# Patient Record
Sex: MALE | Race: BLACK OR AFRICAN AMERICAN | Employment: UNEMPLOYED | URBAN - METROPOLITAN AREA
[De-identification: names, ages, dates, MRNs, and addresses within clinical notes are randomized per-mention and may not be internally consistent; named-entity substitution may affect disease eponyms.]

---

## 2018-04-11 ENCOUNTER — HOSPITAL ENCOUNTER (OUTPATIENT)
Dept: NEUROLOGY | Age: 43
Discharge: HOME | End: 2018-04-11
Payer: COMMERCIAL

## 2018-04-11 PROCEDURE — 95911 NRV CNDJ TEST 9-10 STUDIES: CPT

## 2018-04-11 PROCEDURE — 95911 NRV CNDJ TEST 9-10 STUDIES: CPT | Performed by: PHYSICAL MEDICINE & REHABILITATION

## 2018-04-11 PROCEDURE — 95886 MUSC TEST DONE W/N TEST COMP: CPT

## 2018-04-11 PROCEDURE — 95886 MUSC TEST DONE W/N TEST COMP: CPT | Performed by: PHYSICAL MEDICINE & REHABILITATION

## 2018-06-30 ENCOUNTER — HOSPITAL ENCOUNTER (EMERGENCY)
Age: 43
Discharge: HOME | End: 2018-06-30
Payer: COMMERCIAL

## 2018-06-30 VITALS
OXYGEN SATURATION: 97 % | TEMPERATURE: 96.7 F | HEART RATE: 96 BPM | SYSTOLIC BLOOD PRESSURE: 119 MMHG | DIASTOLIC BLOOD PRESSURE: 94 MMHG | RESPIRATION RATE: 17 BRPM

## 2018-06-30 DIAGNOSIS — J02.9 ACUTE PHARYNGITIS, UNSPECIFIED ETIOLOGY: Primary | ICD-10-CM

## 2018-06-30 LAB — STREP GRP A PCR: NEGATIVE

## 2018-06-30 PROCEDURE — 99282 EMERGENCY DEPT VISIT SF MDM: CPT

## 2018-06-30 PROCEDURE — 87880 STREP A ASSAY W/OPTIC: CPT

## 2018-06-30 RX ORDER — ACETAMINOPHEN 500 MG
1000 TABLET ORAL ONCE
Status: DISCONTINUED | OUTPATIENT
Start: 2018-06-30 | End: 2018-06-30 | Stop reason: HOSPADM

## 2018-06-30 RX ORDER — AMOXICILLIN 500 MG/1
500 CAPSULE ORAL 3 TIMES DAILY
Qty: 30 CAPSULE | Refills: 0 | Status: SHIPPED | OUTPATIENT
Start: 2018-06-30 | End: 2018-07-10

## 2018-06-30 RX ORDER — METHYLPREDNISOLONE 4 MG/1
TABLET ORAL
Qty: 1 KIT | Refills: 0 | Status: SHIPPED | OUTPATIENT
Start: 2018-06-30 | End: 2018-07-06

## 2018-06-30 NOTE — ED PROVIDER NOTES
high suspicion for Strep as per history/physical findings, Rapid Strep/Throat Culture testing was done and confirms the above findings  Pharyngitis is likely to  be Strep. Antibiotics are indicated at this time based on clinical presentation and physical findings. Not hypoxic, nothing to suggest pneumonia. Patient is well appearing, non toxic and appropriate for outpatient management. Plan of Care: Instruction provided in the use of fluids, vaporizer, acetaminophen, and other OTC medication for symptom control. Analgesics as needed, dose reviewed. Follow-up in 2 days, or sooner should symptoms worsen. Counseling: The emergency provider has spoken with the patient and discussed todays results, in addition to providing specific details for the plan of care and counseling regarding the diagnosis and prognosis. Questions are answered at this time and they are agreeable with the plan. Assessment     1. Acute pharyngitis, unspecified etiology      Plan   Discharge to home  Patient condition is good    New Medications     Discharge Medication List as of 6/30/2018 10:24 AM      START taking these medications    Details   amoxicillin (AMOXIL) 500 MG capsule Take 1 capsule by mouth 3 times daily for 10 days, Disp-30 capsule, R-0Print      methylPREDNISolone (MEDROL, PRASANTH,) 4 MG tablet Take by mouth., Disp-1 kit, R-0Print      Magic Mouthwash (MIRACLE MOUTHWASH) Swish and spit 5 mLs 4 times daily as needed for Irritation, Disp-240 mL, R-0Print           Electronically signed by NICHOLAS Hayward CNP   DD: 6/30/18  **This report was transcribed using voice recognition software. Every effort was made to ensure accuracy; however, inadvertent computerized transcription errors may be present.   END OF ED PROVIDER NOTE      NICHOLAS Hayward CNP  06/30/18 3 [facility] NICHOLAS Telles CNP  06/30/18 [id number]

## 2019-05-30 ENCOUNTER — APPOINTMENT (OUTPATIENT)
Dept: GENERAL RADIOLOGY | Age: 44
End: 2019-05-30
Payer: COMMERCIAL

## 2019-05-30 ENCOUNTER — HOSPITAL ENCOUNTER (EMERGENCY)
Age: 44
Discharge: HOME | End: 2019-05-30
Attending: EMERGENCY MEDICINE
Payer: COMMERCIAL

## 2019-05-30 VITALS
BODY MASS INDEX: 30.37 KG/M2 | RESPIRATION RATE: 16 BRPM | HEIGHT: 66 IN | SYSTOLIC BLOOD PRESSURE: 117 MMHG | DIASTOLIC BLOOD PRESSURE: 78 MMHG | WEIGHT: 189 LBS | TEMPERATURE: 98.2 F | HEART RATE: 98 BPM | OXYGEN SATURATION: 96 %

## 2019-05-30 DIAGNOSIS — L73.8 INFECTIOUS FOLLICULITIS: Primary | ICD-10-CM

## 2019-05-30 DIAGNOSIS — J45.901 EXACERBATION OF ASTHMA, UNSPECIFIED ASTHMA SEVERITY, UNSPECIFIED WHETHER PERSISTENT: ICD-10-CM

## 2019-05-30 DIAGNOSIS — B99.9 INFECTIOUS FOLLICULITIS: Primary | ICD-10-CM

## 2019-05-30 DIAGNOSIS — Z72.0 TOBACCO ABUSE: ICD-10-CM

## 2019-05-30 LAB
ANION GAP SERPL CALCULATED.3IONS-SCNC: 13 MMOL/L (ref 7–16)
BASOPHILS ABSOLUTE: 0.07 E9/L (ref 0–0.2)
BASOPHILS RELATIVE PERCENT: 0.6 % (ref 0–2)
BUN BLDV-MCNC: 17 MG/DL (ref 6–20)
CALCIUM SERPL-MCNC: 10 MG/DL (ref 8.6–10.2)
CHLORIDE BLD-SCNC: 96 MMOL/L (ref 98–107)
CO2: 24 MMOL/L (ref 22–29)
CREAT SERPL-MCNC: 0.8 MG/DL (ref 0.7–1.2)
EOSINOPHILS ABSOLUTE: 0.2 E9/L (ref 0.05–0.5)
EOSINOPHILS RELATIVE PERCENT: 1.8 % (ref 0–6)
GFR AFRICAN AMERICAN: >60
GFR NON-AFRICAN AMERICAN: >60 ML/MIN/1.73
GLUCOSE BLD-MCNC: 421 MG/DL (ref 74–99)
HCT VFR BLD CALC: 46.4 % (ref 37–54)
HEMOGLOBIN: 15.4 G/DL (ref 12.5–16.5)
IMMATURE GRANULOCYTES #: 0.08 E9/L
IMMATURE GRANULOCYTES %: 0.7 % (ref 0–5)
LYMPHOCYTES ABSOLUTE: 2.32 E9/L (ref 1.5–4)
LYMPHOCYTES RELATIVE PERCENT: 20.6 % (ref 20–42)
MCH RBC QN AUTO: 29.4 PG (ref 26–35)
MCHC RBC AUTO-ENTMCNC: 33.2 % (ref 32–34.5)
MCV RBC AUTO: 88.7 FL (ref 80–99.9)
MONOCYTES ABSOLUTE: 0.83 E9/L (ref 0.1–0.95)
MONOCYTES RELATIVE PERCENT: 7.4 % (ref 2–12)
NEUTROPHILS ABSOLUTE: 7.76 E9/L (ref 1.8–7.3)
NEUTROPHILS RELATIVE PERCENT: 68.9 % (ref 43–80)
PDW BLD-RTO: 15.1 FL (ref 11.5–15)
PLATELET # BLD: 310 E9/L (ref 130–450)
PMV BLD AUTO: 10.9 FL (ref 7–12)
POTASSIUM SERPL-SCNC: 4.7 MMOL/L (ref 3.5–5)
RBC # BLD: 5.23 E12/L (ref 3.8–5.8)
SODIUM BLD-SCNC: 133 MMOL/L (ref 132–146)
WBC # BLD: 11.3 E9/L (ref 4.5–11.5)

## 2019-05-30 PROCEDURE — 94664 DEMO&/EVAL PT USE INHALER: CPT

## 2019-05-30 PROCEDURE — 96374 THER/PROPH/DIAG INJ IV PUSH: CPT

## 2019-05-30 PROCEDURE — 80048 BASIC METABOLIC PNL TOTAL CA: CPT

## 2019-05-30 PROCEDURE — 99283 EMERGENCY DEPT VISIT LOW MDM: CPT

## 2019-05-30 PROCEDURE — 6370000000 HC RX 637 (ALT 250 FOR IP): Performed by: EMERGENCY MEDICINE

## 2019-05-30 PROCEDURE — 6360000002 HC RX W HCPCS: Performed by: EMERGENCY MEDICINE

## 2019-05-30 PROCEDURE — 94640 AIRWAY INHALATION TREATMENT: CPT

## 2019-05-30 PROCEDURE — 85025 COMPLETE CBC W/AUTO DIFF WBC: CPT

## 2019-05-30 PROCEDURE — 71046 X-RAY EXAM CHEST 2 VIEWS: CPT

## 2019-05-30 RX ORDER — DOXYCYCLINE HYCLATE 100 MG
100 TABLET ORAL 2 TIMES DAILY
Qty: 20 TABLET | Refills: 0 | Status: SHIPPED | OUTPATIENT
Start: 2019-05-30 | End: 2019-06-09

## 2019-05-30 RX ORDER — ALBUTEROL SULFATE 90 UG/1
2 AEROSOL, METERED RESPIRATORY (INHALATION) EVERY 6 HOURS PRN
Qty: 1 INHALER | Refills: 0 | Status: SHIPPED | OUTPATIENT
Start: 2019-05-30 | End: 2020-07-14

## 2019-05-30 RX ORDER — NEBULIZER ACCESSORIES
1 KIT MISCELLANEOUS DAILY PRN
Qty: 1 KIT | Refills: 0 | Status: SHIPPED | OUTPATIENT
Start: 2019-05-30

## 2019-05-30 RX ORDER — IPRATROPIUM BROMIDE AND ALBUTEROL SULFATE 2.5; .5 MG/3ML; MG/3ML
1 SOLUTION RESPIRATORY (INHALATION)
Status: COMPLETED | OUTPATIENT
Start: 2019-05-30 | End: 2019-05-30

## 2019-05-30 RX ORDER — METHYLPREDNISOLONE SODIUM SUCCINATE 125 MG/2ML
125 INJECTION, POWDER, LYOPHILIZED, FOR SOLUTION INTRAMUSCULAR; INTRAVENOUS ONCE
Status: COMPLETED | OUTPATIENT
Start: 2019-05-30 | End: 2019-05-30

## 2019-05-30 RX ORDER — METHYLPREDNISOLONE 4 MG/1
TABLET ORAL
Qty: 1 KIT | Refills: 0 | Status: SHIPPED | OUTPATIENT
Start: 2019-05-30 | End: 2019-06-05

## 2019-05-30 RX ADMIN — IPRATROPIUM BROMIDE AND ALBUTEROL SULFATE 1 AMPULE: .5; 3 SOLUTION RESPIRATORY (INHALATION) at 14:25

## 2019-05-30 RX ADMIN — IPRATROPIUM BROMIDE AND ALBUTEROL SULFATE 1 AMPULE: .5; 3 SOLUTION RESPIRATORY (INHALATION) at 14:43

## 2019-05-30 RX ADMIN — IPRATROPIUM BROMIDE AND ALBUTEROL SULFATE 1 AMPULE: .5; 3 SOLUTION RESPIRATORY (INHALATION) at 14:39

## 2019-05-30 RX ADMIN — METHYLPREDNISOLONE SODIUM SUCCINATE 125 MG: 125 INJECTION, POWDER, FOR SOLUTION INTRAMUSCULAR; INTRAVENOUS at 14:16

## 2019-05-30 ASSESSMENT — ENCOUNTER SYMPTOMS
WHEEZING: 1
RHINORRHEA: 0
BLOOD IN STOOL: 0
NAUSEA: 0
DIARRHEA: 0
CONSTIPATION: 0
BACK PAIN: 0
SINUS PRESSURE: 0
SHORTNESS OF BREATH: 0
COUGH: 1
EYE PAIN: 0
ABDOMINAL PAIN: 0
SORE THROAT: 0
VOMITING: 0
EYE DISCHARGE: 0
EYE REDNESS: 0

## 2019-05-30 ASSESSMENT — PAIN DESCRIPTION - PAIN TYPE: TYPE: ACUTE PAIN

## 2019-05-30 ASSESSMENT — PAIN DESCRIPTION - LOCATION: LOCATION: RIB CAGE
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
## 2019-08-27 ENCOUNTER — HOSPITAL ENCOUNTER (OUTPATIENT)
Age: 44
Discharge: HOME | End: 2019-08-29

## 2019-08-27 PROCEDURE — 86735 MUMPS ANTIBODY: CPT

## 2019-08-27 PROCEDURE — 86762 RUBELLA ANTIBODY: CPT

## 2019-08-27 PROCEDURE — 86787 VARICELLA-ZOSTER ANTIBODY: CPT

## 2019-08-27 PROCEDURE — 86765 RUBEOLA ANTIBODY: CPT

## 2019-08-29 LAB
MEASLES IMMUNE (IGG): NORMAL
MUMPS AB IGG: NORMAL
RUBELLA ANTIBODY IGG: NORMAL
VARICELLA-ZOSTER VIRUS AB, IGG: NORMAL

## 2019-11-28 ENCOUNTER — HOSPITAL ENCOUNTER (OUTPATIENT)
Age: 44
Setting detail: OBSERVATION
Discharge: HOME | End: 2019-11-30
Attending: EMERGENCY MEDICINE | Admitting: INTERNAL MEDICINE
Payer: COMMERCIAL

## 2019-11-28 DIAGNOSIS — B20 HUMAN IMMUNODEFICIENCY VIRUS (HIV) DISEASE (HCC): ICD-10-CM

## 2019-11-28 DIAGNOSIS — K13.79 UVULAR SWELLING: ICD-10-CM

## 2019-11-28 DIAGNOSIS — J36 PERITONSILLAR ABSCESS: Primary | ICD-10-CM

## 2019-11-28 LAB
ANION GAP SERPL CALCULATED.3IONS-SCNC: 18 MMOL/L (ref 7–16)
BASOPHILS ABSOLUTE: 0.02 E9/L (ref 0–0.2)
BASOPHILS RELATIVE PERCENT: 0.1 % (ref 0–2)
BUN BLDV-MCNC: 9 MG/DL (ref 6–20)
CALCIUM SERPL-MCNC: 9.4 MG/DL (ref 8.6–10.2)
CHLORIDE BLD-SCNC: 99 MMOL/L (ref 98–107)
CO2: 21 MMOL/L (ref 22–29)
CREAT SERPL-MCNC: 0.6 MG/DL (ref 0.7–1.2)
EOSINOPHILS ABSOLUTE: 0 E9/L (ref 0.05–0.5)
EOSINOPHILS RELATIVE PERCENT: 0 % (ref 0–6)
GFR AFRICAN AMERICAN: >60
GFR NON-AFRICAN AMERICAN: >60 ML/MIN/1.73
GLUCOSE BLD-MCNC: 172 MG/DL (ref 74–99)
HCT VFR BLD CALC: 43 % (ref 37–54)
HEMOGLOBIN: 13.8 G/DL (ref 12.5–16.5)
IMMATURE GRANULOCYTES #: 0.14 E9/L
IMMATURE GRANULOCYTES %: 0.8 % (ref 0–5)
LYMPHOCYTES ABSOLUTE: 1.09 E9/L (ref 1.5–4)
LYMPHOCYTES RELATIVE PERCENT: 5.9 % (ref 20–42)
MCH RBC QN AUTO: 28.1 PG (ref 26–35)
MCHC RBC AUTO-ENTMCNC: 32.1 % (ref 32–34.5)
MCV RBC AUTO: 87.6 FL (ref 80–99.9)
MONOCYTES ABSOLUTE: 0.39 E9/L (ref 0.1–0.95)
MONOCYTES RELATIVE PERCENT: 2.1 % (ref 2–12)
NEUTROPHILS ABSOLUTE: 16.99 E9/L (ref 1.8–7.3)
NEUTROPHILS RELATIVE PERCENT: 91.1 % (ref 43–80)
PDW BLD-RTO: 12.9 FL (ref 11.5–15)
PLATELET # BLD: 381 E9/L (ref 130–450)
PMV BLD AUTO: 10.4 FL (ref 7–12)
POTASSIUM SERPL-SCNC: 4.1 MMOL/L (ref 3.5–5)
RBC # BLD: 4.91 E12/L (ref 3.8–5.8)
SODIUM BLD-SCNC: 138 MMOL/L (ref 132–146)
WBC # BLD: 18.6 E9/L (ref 4.5–11.5)

## 2019-11-28 PROCEDURE — 6360000002 HC RX W HCPCS

## 2019-11-28 PROCEDURE — 94761 N-INVAS EAR/PLS OXIMETRY MLT: CPT

## 2019-11-28 PROCEDURE — 96374 THER/PROPH/DIAG INJ IV PUSH: CPT

## 2019-11-28 PROCEDURE — G0378 HOSPITAL OBSERVATION PER HR: HCPCS

## 2019-11-28 PROCEDURE — 96375 TX/PRO/DX INJ NEW DRUG ADDON: CPT

## 2019-11-28 PROCEDURE — 85025 COMPLETE CBC W/AUTO DIFF WBC: CPT

## 2019-11-28 PROCEDURE — 80048 BASIC METABOLIC PNL TOTAL CA: CPT

## 2019-11-28 PROCEDURE — 6370000000 HC RX 637 (ALT 250 FOR IP): Performed by: STUDENT IN AN ORGANIZED HEALTH CARE EDUCATION/TRAINING PROGRAM

## 2019-11-28 PROCEDURE — 6360000002 HC RX W HCPCS: Performed by: EMERGENCY MEDICINE

## 2019-11-28 PROCEDURE — 36415 COLL VENOUS BLD VENIPUNCTURE: CPT

## 2019-11-28 PROCEDURE — 99284 EMERGENCY DEPT VISIT MOD MDM: CPT

## 2019-11-28 RX ORDER — HYDROCHLOROTHIAZIDE 12.5 MG/1
12.5 TABLET ORAL DAILY
Status: DISCONTINUED | OUTPATIENT
Start: 2019-11-29 | End: 2019-11-30 | Stop reason: HOSPADM

## 2019-11-28 RX ORDER — MORPHINE SULFATE 2 MG/ML
2 INJECTION, SOLUTION INTRAMUSCULAR; INTRAVENOUS ONCE
Status: COMPLETED | OUTPATIENT
Start: 2019-11-28 | End: 2019-11-28

## 2019-11-28 RX ORDER — LISINOPRIL 20 MG/1
40 TABLET ORAL DAILY
Status: DISCONTINUED | OUTPATIENT
Start: 2019-11-29 | End: 2019-11-30 | Stop reason: HOSPADM

## 2019-11-28 RX ORDER — ONDANSETRON 2 MG/ML
4 INJECTION INTRAMUSCULAR; INTRAVENOUS EVERY 6 HOURS PRN
Status: DISCONTINUED | OUTPATIENT
Start: 2019-11-28 | End: 2019-11-30 | Stop reason: HOSPADM

## 2019-11-28 RX ORDER — INSULIN GLARGINE 100 [IU]/ML
30 INJECTION, SOLUTION SUBCUTANEOUS DAILY
Status: DISCONTINUED | OUTPATIENT
Start: 2019-11-29 | End: 2019-11-30 | Stop reason: HOSPADM

## 2019-11-28 RX ORDER — SODIUM CHLORIDE 0.9 % (FLUSH) 0.9 %
10 SYRINGE (ML) INJECTION PRN
Status: DISCONTINUED | OUTPATIENT
Start: 2019-11-28 | End: 2019-11-30 | Stop reason: HOSPADM

## 2019-11-28 RX ORDER — SODIUM CHLORIDE 0.9 % (FLUSH) 0.9 %
10 SYRINGE (ML) INJECTION EVERY 12 HOURS SCHEDULED
Status: DISCONTINUED | OUTPATIENT
Start: 2019-11-28 | End: 2019-11-30 | Stop reason: HOSPADM

## 2019-11-28 RX ORDER — DULOXETIN HYDROCHLORIDE 30 MG/1
30 CAPSULE, DELAYED RELEASE ORAL DAILY
Status: DISCONTINUED | OUTPATIENT
Start: 2019-11-29 | End: 2019-11-30 | Stop reason: HOSPADM

## 2019-11-28 RX ORDER — ALBUTEROL SULFATE 90 UG/1
2 AEROSOL, METERED RESPIRATORY (INHALATION) EVERY 6 HOURS PRN
Status: DISCONTINUED | OUTPATIENT
Start: 2019-11-28 | End: 2019-11-30 | Stop reason: HOSPADM

## 2019-11-28 RX ORDER — DEXAMETHASONE SODIUM PHOSPHATE 10 MG/ML
10 INJECTION INTRAMUSCULAR; INTRAVENOUS EVERY 8 HOURS
Status: DISCONTINUED | OUTPATIENT
Start: 2019-11-28 | End: 2019-11-30 | Stop reason: HOSPADM

## 2019-11-28 RX ORDER — EMTRICITABINE AND TENOFOVIR DISOPROXIL FUMARATE 200; 300 MG/1; MG/1
1 TABLET, FILM COATED ORAL DAILY
Status: DISCONTINUED | OUTPATIENT
Start: 2019-11-29 | End: 2019-11-30 | Stop reason: HOSPADM

## 2019-11-28 RX ORDER — MORPHINE SULFATE 2 MG/ML
INJECTION, SOLUTION INTRAMUSCULAR; INTRAVENOUS
Status: COMPLETED
Start: 2019-11-28 | End: 2019-11-28

## 2019-11-28 RX ORDER — MONTELUKAST SODIUM 10 MG/1
10 TABLET ORAL NIGHTLY
Status: DISCONTINUED | OUTPATIENT
Start: 2019-11-28 | End: 2019-11-30 | Stop reason: HOSPADM

## 2019-11-28 RX ORDER — PANTOPRAZOLE SODIUM 40 MG/1
40 TABLET, DELAYED RELEASE ORAL
Status: DISCONTINUED | OUTPATIENT
Start: 2019-11-29 | End: 2019-11-30 | Stop reason: HOSPADM

## 2019-11-28 RX ORDER — FLUTICASONE PROPIONATE 50 MCG
1 SPRAY, SUSPENSION (ML) NASAL DAILY
Status: DISCONTINUED | OUTPATIENT
Start: 2019-11-29 | End: 2019-11-30 | Stop reason: HOSPADM

## 2019-11-28 RX ORDER — GABAPENTIN 100 MG/1
100 CAPSULE ORAL 3 TIMES DAILY
Status: DISCONTINUED | OUTPATIENT
Start: 2019-11-28 | End: 2019-11-30 | Stop reason: HOSPADM

## 2019-11-28 RX ORDER — ATAZANAVIR 300 MG/1
300 CAPSULE ORAL
Status: DISCONTINUED | OUTPATIENT
Start: 2019-11-29 | End: 2019-11-30 | Stop reason: HOSPADM

## 2019-11-28 RX ADMIN — MORPHINE SULFATE 2 MG: 2 INJECTION, SOLUTION INTRAMUSCULAR; INTRAVENOUS at 21:18

## 2019-11-28 RX ADMIN — DEXAMETHASONE SODIUM PHOSPHATE 10 MG: 10 INJECTION INTRAMUSCULAR; INTRAVENOUS at 22:13

## 2019-11-28 RX ADMIN — BENZOCAINE, BUTAMBEN, AND TETRACAINE HYDROCHLORIDE 1 SPRAY: .028; .004; .004 AEROSOL, SPRAY TOPICAL at 21:20

## 2019-11-28 ASSESSMENT — PAIN SCALES - GENERAL
PAINLEVEL_OUTOF10: 8
PAINLEVEL_OUTOF10: 10
PAINLEVEL_OUTOF10: 0

## 2019-11-28 ASSESSMENT — PAIN DESCRIPTION - LOCATION: LOCATION: THROAT

## 2019-11-29 LAB
HCT VFR BLD CALC: 42 % (ref 37–54)
HEMOGLOBIN: 13.7 G/DL (ref 12.5–16.5)
MCH RBC QN AUTO: 29 PG (ref 26–35)
MCHC RBC AUTO-ENTMCNC: 32.6 % (ref 32–34.5)
MCV RBC AUTO: 88.8 FL (ref 80–99.9)
PDW BLD-RTO: 12.9 FL (ref 11.5–15)
PLATELET # BLD: 384 E9/L (ref 130–450)
PMV BLD AUTO: 10.8 FL (ref 7–12)
RBC # BLD: 4.73 E12/L (ref 3.8–5.8)
WBC # BLD: 16.2 E9/L (ref 4.5–11.5)

## 2019-11-29 PROCEDURE — 6370000000 HC RX 637 (ALT 250 FOR IP): Performed by: CLINICAL NURSE SPECIALIST

## 2019-11-29 PROCEDURE — 2580000003 HC RX 258: Performed by: INTERNAL MEDICINE

## 2019-11-29 PROCEDURE — G0378 HOSPITAL OBSERVATION PER HR: HCPCS

## 2019-11-29 PROCEDURE — 96376 TX/PRO/DX INJ SAME DRUG ADON: CPT

## 2019-11-29 PROCEDURE — 2580000003 HC RX 258: Performed by: CLINICAL NURSE SPECIALIST

## 2019-11-29 PROCEDURE — 96366 THER/PROPH/DIAG IV INF ADDON: CPT

## 2019-11-29 PROCEDURE — 6360000002 HC RX W HCPCS: Performed by: CLINICAL NURSE SPECIALIST

## 2019-11-29 PROCEDURE — 85027 COMPLETE CBC AUTOMATED: CPT

## 2019-11-29 PROCEDURE — 96365 THER/PROPH/DIAG IV INF INIT: CPT

## 2019-11-29 PROCEDURE — 36415 COLL VENOUS BLD VENIPUNCTURE: CPT

## 2019-11-29 PROCEDURE — 6360000002 HC RX W HCPCS: Performed by: INTERNAL MEDICINE

## 2019-11-29 RX ADMIN — LISINOPRIL 40 MG: 20 TABLET ORAL at 09:35

## 2019-11-29 RX ADMIN — GABAPENTIN 100 MG: 100 CAPSULE ORAL at 21:56

## 2019-11-29 RX ADMIN — FLUTICASONE PROPIONATE 1 SPRAY: 50 SPRAY, METERED NASAL at 09:34

## 2019-11-29 RX ADMIN — DEXAMETHASONE SODIUM PHOSPHATE 10 MG: 10 INJECTION INTRAMUSCULAR; INTRAVENOUS at 13:58

## 2019-11-29 RX ADMIN — INSULIN GLARGINE 30 UNITS: 100 INJECTION, SOLUTION SUBCUTANEOUS at 09:35

## 2019-11-29 RX ADMIN — METFORMIN HYDROCHLORIDE 500 MG: 500 TABLET ORAL at 09:35

## 2019-11-29 RX ADMIN — EMTRICITABINE AND TENOFOVIR DISOPROXIL FUMARATE 1 TABLET: 200; 300 TABLET, FILM COATED ORAL at 09:34

## 2019-11-29 RX ADMIN — GABAPENTIN 100 MG: 100 CAPSULE ORAL at 14:35

## 2019-11-29 RX ADMIN — DEXAMETHASONE SODIUM PHOSPHATE 10 MG: 10 INJECTION INTRAMUSCULAR; INTRAVENOUS at 21:56

## 2019-11-29 RX ADMIN — Medication 10 ML: at 14:07

## 2019-11-29 RX ADMIN — Medication 10 ML: at 22:02

## 2019-11-29 RX ADMIN — AMPICILLIN SODIUM AND SULBACTAM SODIUM 3 G: 2; 1 INJECTION, POWDER, FOR SOLUTION INTRAMUSCULAR; INTRAVENOUS at 18:00

## 2019-11-29 RX ADMIN — HYDROCHLOROTHIAZIDE 12.5 MG: 12.5 TABLET ORAL at 09:35

## 2019-11-29 RX ADMIN — AMPICILLIN SODIUM AND SULBACTAM SODIUM 3 G: 2; 1 INJECTION, POWDER, FOR SOLUTION INTRAMUSCULAR; INTRAVENOUS at 23:16

## 2019-11-29 RX ADMIN — GABAPENTIN 100 MG: 100 CAPSULE ORAL at 00:18

## 2019-11-29 RX ADMIN — ATAZANAVIR 300 MG: 300 CAPSULE ORAL at 13:58

## 2019-11-29 RX ADMIN — PANTOPRAZOLE SODIUM 40 MG: 40 TABLET, DELAYED RELEASE ORAL at 06:31

## 2019-11-29 RX ADMIN — MONTELUKAST SODIUM 10 MG: 10 TABLET ORAL at 21:56

## 2019-11-29 RX ADMIN — AMPICILLIN SODIUM AND SULBACTAM SODIUM 3 G: 2; 1 INJECTION, POWDER, FOR SOLUTION INTRAMUSCULAR; INTRAVENOUS at 13:59

## 2019-11-29 RX ADMIN — DULOXETINE HYDROCHLORIDE 30 MG: 30 CAPSULE, DELAYED RELEASE ORAL at 09:35

## 2019-11-29 RX ADMIN — MONTELUKAST SODIUM 10 MG: 10 TABLET ORAL at 00:18

## 2019-11-29 RX ADMIN — DEXAMETHASONE SODIUM PHOSPHATE 10 MG: 10 INJECTION INTRAMUSCULAR; INTRAVENOUS at 06:30

## 2019-11-29 RX ADMIN — Medication 10 ML: at 00:18

## 2019-11-29 RX ADMIN — GABAPENTIN 100 MG: 100 CAPSULE ORAL at 09:35

## 2019-11-29 ASSESSMENT — PAIN SCALES - GENERAL
PAINLEVEL_OUTOF10: 0
PAINLEVEL_OUTOF10: 0

## 2019-11-30 VITALS
WEIGHT: 150 LBS | HEART RATE: 64 BPM | OXYGEN SATURATION: 98 % | SYSTOLIC BLOOD PRESSURE: 123 MMHG | BODY MASS INDEX: 22.73 KG/M2 | RESPIRATION RATE: 18 BRPM | DIASTOLIC BLOOD PRESSURE: 70 MMHG | HEIGHT: 68 IN | TEMPERATURE: 98.5 F

## 2019-11-30 LAB — METER GLUCOSE: 358 MG/DL (ref 74–99)

## 2019-11-30 PROCEDURE — 6360000002 HC RX W HCPCS: Performed by: INTERNAL MEDICINE

## 2019-11-30 PROCEDURE — 6370000000 HC RX 637 (ALT 250 FOR IP): Performed by: CLINICAL NURSE SPECIALIST

## 2019-11-30 PROCEDURE — 6360000002 HC RX W HCPCS: Performed by: CLINICAL NURSE SPECIALIST

## 2019-11-30 PROCEDURE — G0378 HOSPITAL OBSERVATION PER HR: HCPCS

## 2019-11-30 PROCEDURE — 2580000003 HC RX 258: Performed by: CLINICAL NURSE SPECIALIST

## 2019-11-30 PROCEDURE — 96376 TX/PRO/DX INJ SAME DRUG ADON: CPT

## 2019-11-30 PROCEDURE — 82962 GLUCOSE BLOOD TEST: CPT

## 2019-11-30 PROCEDURE — 99253 IP/OBS CNSLTJ NEW/EST LOW 45: CPT | Performed by: OTOLARYNGOLOGY

## 2019-11-30 PROCEDURE — 2580000003 HC RX 258: Performed by: INTERNAL MEDICINE

## 2019-11-30 PROCEDURE — 96366 THER/PROPH/DIAG IV INF ADDON: CPT

## 2019-11-30 RX ORDER — CLINDAMYCIN HYDROCHLORIDE 300 MG/1
300 CAPSULE ORAL 3 TIMES DAILY
Qty: 15 CAPSULE | Refills: 0 | Status: SHIPPED | OUTPATIENT
Start: 2019-11-30 | End: 2019-12-05

## 2019-11-30 RX ORDER — PREDNISONE 20 MG/1
40 TABLET ORAL DAILY
Qty: 10 TABLET | Refills: 0 | Status: SHIPPED | OUTPATIENT
Start: 2019-11-30 | End: 2019-12-05

## 2019-11-30 RX ADMIN — AMPICILLIN SODIUM AND SULBACTAM SODIUM 3 G: 2; 1 INJECTION, POWDER, FOR SOLUTION INTRAMUSCULAR; INTRAVENOUS at 05:50

## 2019-11-30 RX ADMIN — DULOXETINE HYDROCHLORIDE 30 MG: 30 CAPSULE, DELAYED RELEASE ORAL at 08:29

## 2019-11-30 RX ADMIN — METFORMIN HYDROCHLORIDE 500 MG: 500 TABLET ORAL at 08:29

## 2019-11-30 RX ADMIN — PANTOPRAZOLE SODIUM 40 MG: 40 TABLET, DELAYED RELEASE ORAL at 06:48

## 2019-11-30 RX ADMIN — DEXAMETHASONE SODIUM PHOSPHATE 10 MG: 10 INJECTION INTRAMUSCULAR; INTRAVENOUS at 06:48

## 2019-11-30 RX ADMIN — AMPICILLIN SODIUM AND SULBACTAM SODIUM 3 G: 2; 1 INJECTION, POWDER, FOR SOLUTION INTRAMUSCULAR; INTRAVENOUS at 10:31

## 2019-11-30 RX ADMIN — Medication 10 ML: at 10:33

## 2019-11-30 RX ADMIN — EMTRICITABINE AND TENOFOVIR DISOPROXIL FUMARATE 1 TABLET: 200; 300 TABLET, FILM COATED ORAL at 08:28

## 2019-11-30 RX ADMIN — GABAPENTIN 100 MG: 100 CAPSULE ORAL at 08:29

## 2019-11-30 RX ADMIN — DEXAMETHASONE SODIUM PHOSPHATE 10 MG: 10 INJECTION INTRAMUSCULAR; INTRAVENOUS at 10:34

## 2019-11-30 RX ADMIN — ATAZANAVIR 300 MG: 300 CAPSULE ORAL at 08:28

## 2019-11-30 RX ADMIN — INSULIN GLARGINE 30 UNITS: 100 INJECTION, SOLUTION SUBCUTANEOUS at 08:36

## 2019-11-30 RX ADMIN — HYDROCHLOROTHIAZIDE 12.5 MG: 12.5 TABLET ORAL at 08:29

## 2019-11-30 RX ADMIN — LISINOPRIL 40 MG: 20 TABLET ORAL at 08:29

## 2019-11-30 RX ADMIN — Medication 10 ML: at 08:30

## 2019-11-30 ASSESSMENT — PAIN SCALES - GENERAL: PAINLEVEL_OUTOF10: 0

## 2020-01-26 ENCOUNTER — HOSPITAL ENCOUNTER (OUTPATIENT)
Age: 45
Setting detail: OBSERVATION
Discharge: HOME | End: 2020-01-27
Attending: EMERGENCY MEDICINE | Admitting: INTERNAL MEDICINE
Payer: COMMERCIAL

## 2020-01-26 ENCOUNTER — APPOINTMENT (OUTPATIENT)
Dept: CT IMAGING | Age: 45
End: 2020-01-26
Payer: COMMERCIAL

## 2020-01-26 VITALS
HEIGHT: 68 IN | DIASTOLIC BLOOD PRESSURE: 87 MMHG | OXYGEN SATURATION: 98 % | SYSTOLIC BLOOD PRESSURE: 142 MMHG | TEMPERATURE: 98.7 F | BODY MASS INDEX: 25.01 KG/M2 | HEART RATE: 101 BPM | WEIGHT: 165 LBS | RESPIRATION RATE: 18 BRPM

## 2020-01-26 LAB
ALBUMIN SERPL-MCNC: 4.2 G/DL (ref 3.5–5.2)
ALP BLD-CCNC: 85 U/L (ref 40–129)
ALT SERPL-CCNC: 18 U/L (ref 0–40)
ANION GAP SERPL CALCULATED.3IONS-SCNC: 12 MMOL/L (ref 7–16)
AST SERPL-CCNC: 24 U/L (ref 0–39)
BASOPHILS ABSOLUTE: 0.09 E9/L (ref 0–0.2)
BASOPHILS RELATIVE PERCENT: 0.7 % (ref 0–2)
BILIRUB SERPL-MCNC: 0.7 MG/DL (ref 0–1.2)
BUN BLDV-MCNC: 12 MG/DL (ref 6–20)
C-REACTIVE PROTEIN: 4 MG/DL (ref 0–0.4)
CALCIUM SERPL-MCNC: 9.3 MG/DL (ref 8.6–10.2)
CHLORIDE BLD-SCNC: 99 MMOL/L (ref 98–107)
CO2: 26 MMOL/L (ref 22–29)
CREAT SERPL-MCNC: 0.9 MG/DL (ref 0.7–1.2)
EOSINOPHILS ABSOLUTE: 0.16 E9/L (ref 0.05–0.5)
EOSINOPHILS RELATIVE PERCENT: 1.3 % (ref 0–6)
FOLATE: 13.4 NG/ML (ref 4.8–24.2)
GFR AFRICAN AMERICAN: >60
GFR NON-AFRICAN AMERICAN: >60 ML/MIN/1.73
GLUCOSE BLD-MCNC: 198 MG/DL (ref 74–99)
HCT VFR BLD CALC: 46.4 % (ref 37–54)
HEMOGLOBIN: 15 G/DL (ref 12.5–16.5)
IMMATURE GRANULOCYTES #: 0.06 E9/L
IMMATURE GRANULOCYTES %: 0.5 % (ref 0–5)
LACTIC ACID: 0.9 MMOL/L (ref 0.5–2.2)
LYMPHOCYTES ABSOLUTE: 2.62 E9/L (ref 1.5–4)
LYMPHOCYTES RELATIVE PERCENT: 20.9 % (ref 20–42)
MCH RBC QN AUTO: 29.2 PG (ref 26–35)
MCHC RBC AUTO-ENTMCNC: 32.3 % (ref 32–34.5)
MCV RBC AUTO: 90.4 FL (ref 80–99.9)
METER GLUCOSE: 321 MG/DL (ref 74–99)
METER GLUCOSE: 329 MG/DL (ref 74–99)
MONO TEST: NEGATIVE
MONOCYTES ABSOLUTE: 1.07 E9/L (ref 0.1–0.95)
MONOCYTES RELATIVE PERCENT: 8.5 % (ref 2–12)
NEUTROPHILS ABSOLUTE: 8.56 E9/L (ref 1.8–7.3)
NEUTROPHILS RELATIVE PERCENT: 68.1 % (ref 43–80)
PDW BLD-RTO: 15.6 FL (ref 11.5–15)
PLATELET # BLD: 307 E9/L (ref 130–450)
PMV BLD AUTO: 10.4 FL (ref 7–12)
POTASSIUM REFLEX MAGNESIUM: 4.4 MMOL/L (ref 3.5–5)
RBC # BLD: 5.13 E12/L (ref 3.8–5.8)
SEDIMENTATION RATE, ERYTHROCYTE: 4 MM/HR (ref 0–15)
SODIUM BLD-SCNC: 137 MMOL/L (ref 132–146)
STREP GRP A PCR: NEGATIVE
TOTAL PROTEIN: 7.8 G/DL (ref 6.4–8.3)
VITAMIN B-12: 415 PG/ML (ref 211–946)
WBC # BLD: 12.6 E9/L (ref 4.5–11.5)

## 2020-01-26 PROCEDURE — 96375 TX/PRO/DX INJ NEW DRUG ADDON: CPT

## 2020-01-26 PROCEDURE — 96365 THER/PROPH/DIAG IV INF INIT: CPT

## 2020-01-26 PROCEDURE — 96372 THER/PROPH/DIAG INJ SC/IM: CPT

## 2020-01-26 PROCEDURE — 82962 GLUCOSE BLOOD TEST: CPT

## 2020-01-26 PROCEDURE — 6360000004 HC RX CONTRAST MEDICATION: Performed by: RADIOLOGY

## 2020-01-26 PROCEDURE — 6360000002 HC RX W HCPCS: Performed by: INTERNAL MEDICINE

## 2020-01-26 PROCEDURE — 82746 ASSAY OF FOLIC ACID SERUM: CPT

## 2020-01-26 PROCEDURE — 86308 HETEROPHILE ANTIBODY SCREEN: CPT

## 2020-01-26 PROCEDURE — 6360000002 HC RX W HCPCS: Performed by: NURSE PRACTITIONER

## 2020-01-26 PROCEDURE — 80053 COMPREHEN METABOLIC PANEL: CPT

## 2020-01-26 PROCEDURE — G0378 HOSPITAL OBSERVATION PER HR: HCPCS

## 2020-01-26 PROCEDURE — 86140 C-REACTIVE PROTEIN: CPT

## 2020-01-26 PROCEDURE — 82607 VITAMIN B-12: CPT

## 2020-01-26 PROCEDURE — 2580000003 HC RX 258: Performed by: INTERNAL MEDICINE

## 2020-01-26 PROCEDURE — 87880 STREP A ASSAY W/OPTIC: CPT

## 2020-01-26 PROCEDURE — 70491 CT SOFT TISSUE NECK W/DYE: CPT

## 2020-01-26 PROCEDURE — 99285 EMERGENCY DEPT VISIT HI MDM: CPT

## 2020-01-26 PROCEDURE — 2580000003 HC RX 258: Performed by: NURSE PRACTITIONER

## 2020-01-26 PROCEDURE — 6370000000 HC RX 637 (ALT 250 FOR IP): Performed by: INTERNAL MEDICINE

## 2020-01-26 PROCEDURE — 83605 ASSAY OF LACTIC ACID: CPT

## 2020-01-26 PROCEDURE — 2500000003 HC RX 250 WO HCPCS: Performed by: NURSE PRACTITIONER

## 2020-01-26 PROCEDURE — 85025 COMPLETE CBC W/AUTO DIFF WBC: CPT

## 2020-01-26 PROCEDURE — 96367 TX/PROPH/DG ADDL SEQ IV INF: CPT

## 2020-01-26 PROCEDURE — 36415 COLL VENOUS BLD VENIPUNCTURE: CPT

## 2020-01-26 PROCEDURE — 96376 TX/PRO/DX INJ SAME DRUG ADON: CPT

## 2020-01-26 PROCEDURE — 85651 RBC SED RATE NONAUTOMATED: CPT

## 2020-01-26 RX ORDER — LISINOPRIL 20 MG/1
40 TABLET ORAL DAILY
Status: DISCONTINUED | OUTPATIENT
Start: 2020-01-26 | End: 2020-01-27 | Stop reason: HOSPADM

## 2020-01-26 RX ORDER — MONTELUKAST SODIUM 10 MG/1
10 TABLET ORAL NIGHTLY
Status: DISCONTINUED | OUTPATIENT
Start: 2020-01-26 | End: 2020-01-27 | Stop reason: HOSPADM

## 2020-01-26 RX ORDER — DEXAMETHASONE SODIUM PHOSPHATE 10 MG/ML
10 INJECTION INTRAMUSCULAR; INTRAVENOUS ONCE
Status: COMPLETED | OUTPATIENT
Start: 2020-01-26 | End: 2020-01-26

## 2020-01-26 RX ORDER — GABAPENTIN 100 MG/1
100 CAPSULE ORAL 3 TIMES DAILY
Status: DISCONTINUED | OUTPATIENT
Start: 2020-01-26 | End: 2020-01-27 | Stop reason: HOSPADM

## 2020-01-26 RX ORDER — EMTRICITABINE AND TENOFOVIR DISOPROXIL FUMARATE 200; 300 MG/1; MG/1
1 TABLET, FILM COATED ORAL DAILY
Status: DISCONTINUED | OUTPATIENT
Start: 2020-01-26 | End: 2020-01-27 | Stop reason: HOSPADM

## 2020-01-26 RX ORDER — ATAZANAVIR 300 MG/1
300 CAPSULE ORAL DAILY
Status: DISCONTINUED | OUTPATIENT
Start: 2020-01-26 | End: 2020-01-27 | Stop reason: HOSPADM

## 2020-01-26 RX ORDER — SODIUM CHLORIDE 0.9 % (FLUSH) 0.9 %
10 SYRINGE (ML) INJECTION EVERY 12 HOURS SCHEDULED
Status: DISCONTINUED | OUTPATIENT
Start: 2020-01-26 | End: 2020-01-27 | Stop reason: HOSPADM

## 2020-01-26 RX ORDER — CLINDAMYCIN PHOSPHATE 600 MG/50ML
600 INJECTION INTRAVENOUS EVERY 8 HOURS
Status: DISCONTINUED | OUTPATIENT
Start: 2020-01-26 | End: 2020-01-26

## 2020-01-26 RX ORDER — HYDROCHLOROTHIAZIDE 12.5 MG/1
12.5 TABLET ORAL DAILY
Status: DISCONTINUED | OUTPATIENT
Start: 2020-01-26 | End: 2020-01-27 | Stop reason: HOSPADM

## 2020-01-26 RX ORDER — CLINDAMYCIN PHOSPHATE 600 MG/50ML
600 INJECTION INTRAVENOUS ONCE
Status: COMPLETED | OUTPATIENT
Start: 2020-01-26 | End: 2020-01-26

## 2020-01-26 RX ORDER — DULOXETIN HYDROCHLORIDE 30 MG/1
30 CAPSULE, DELAYED RELEASE ORAL DAILY
Status: DISCONTINUED | OUTPATIENT
Start: 2020-01-26 | End: 2020-01-27 | Stop reason: HOSPADM

## 2020-01-26 RX ORDER — RITONAVIR 100 MG/1
100 TABLET ORAL 2 TIMES DAILY WITH MEALS
Status: DISCONTINUED | OUTPATIENT
Start: 2020-01-26 | End: 2020-01-27 | Stop reason: HOSPADM

## 2020-01-26 RX ORDER — 0.9 % SODIUM CHLORIDE 0.9 %
1000 INTRAVENOUS SOLUTION INTRAVENOUS ONCE
Status: COMPLETED | OUTPATIENT
Start: 2020-01-26 | End: 2020-01-26

## 2020-01-26 RX ORDER — SODIUM CHLORIDE 0.9 % (FLUSH) 0.9 %
10 SYRINGE (ML) INJECTION PRN
Status: DISCONTINUED | OUTPATIENT
Start: 2020-01-26 | End: 2020-01-27 | Stop reason: HOSPADM

## 2020-01-26 RX ORDER — ONDANSETRON 2 MG/ML
4 INJECTION INTRAMUSCULAR; INTRAVENOUS EVERY 6 HOURS PRN
Status: DISCONTINUED | OUTPATIENT
Start: 2020-01-26 | End: 2020-01-27 | Stop reason: HOSPADM

## 2020-01-26 RX ORDER — INSULIN GLARGINE 100 [IU]/ML
15 INJECTION, SOLUTION SUBCUTANEOUS DAILY
Status: DISCONTINUED | OUTPATIENT
Start: 2020-01-26 | End: 2020-01-27 | Stop reason: HOSPADM

## 2020-01-26 RX ORDER — DEXAMETHASONE SODIUM PHOSPHATE 4 MG/ML
10 INJECTION, SOLUTION INTRA-ARTICULAR; INTRALESIONAL; INTRAMUSCULAR; INTRAVENOUS; SOFT TISSUE EVERY 8 HOURS
Status: COMPLETED | OUTPATIENT
Start: 2020-01-26 | End: 2020-01-27

## 2020-01-26 RX ADMIN — ENOXAPARIN SODIUM 40 MG: 40 INJECTION SUBCUTANEOUS at 17:44

## 2020-01-26 RX ADMIN — EMTRICITABINE AND TENOFOVIR DISOPROXIL FUMARATE 1 TABLET: 200; 300 TABLET, FILM COATED ORAL at 19:01

## 2020-01-26 RX ADMIN — MONTELUKAST SODIUM 10 MG: 10 TABLET ORAL at 21:30

## 2020-01-26 RX ADMIN — RITONAVIR 100 MG: 100 TABLET, FILM COATED ORAL at 19:01

## 2020-01-26 RX ADMIN — DEXAMETHASONE SODIUM PHOSPHATE 10 MG: 4 INJECTION, SOLUTION INTRAMUSCULAR; INTRAVENOUS at 17:45

## 2020-01-26 RX ADMIN — IOPAMIDOL 90 ML: 755 INJECTION, SOLUTION INTRAVENOUS at 11:36

## 2020-01-26 RX ADMIN — DULOXETINE HYDROCHLORIDE 30 MG: 30 CAPSULE, DELAYED RELEASE ORAL at 19:01

## 2020-01-26 RX ADMIN — INSULIN LISPRO 5 UNITS: 100 INJECTION, SOLUTION INTRAVENOUS; SUBCUTANEOUS at 17:46

## 2020-01-26 RX ADMIN — SODIUM CHLORIDE 3 G: 900 INJECTION INTRAVENOUS at 21:28

## 2020-01-26 RX ADMIN — GABAPENTIN 100 MG: 100 CAPSULE ORAL at 17:45

## 2020-01-26 RX ADMIN — CLINDAMYCIN PHOSPHATE 600 MG: 600 INJECTION, SOLUTION INTRAVENOUS at 12:47

## 2020-01-26 RX ADMIN — SODIUM CHLORIDE, PRESERVATIVE FREE 10 ML: 5 INJECTION INTRAVENOUS at 21:28

## 2020-01-26 RX ADMIN — LISINOPRIL 40 MG: 20 TABLET ORAL at 19:01

## 2020-01-26 RX ADMIN — INSULIN LISPRO 2 UNITS: 100 INJECTION, SOLUTION INTRAVENOUS; SUBCUTANEOUS at 21:32

## 2020-01-26 RX ADMIN — INSULIN GLARGINE 15 UNITS: 100 INJECTION, SOLUTION SUBCUTANEOUS at 21:33

## 2020-01-26 RX ADMIN — HYDROCHLOROTHIAZIDE 12.5 MG: 12.5 TABLET ORAL at 19:01

## 2020-01-26 RX ADMIN — SODIUM CHLORIDE 1000 ML: 9 INJECTION, SOLUTION INTRAVENOUS at 10:33

## 2020-01-26 RX ADMIN — GABAPENTIN 100 MG: 100 CAPSULE ORAL at 21:30

## 2020-01-26 RX ADMIN — DEXAMETHASONE SODIUM PHOSPHATE 10 MG: 10 INJECTION INTRAMUSCULAR; INTRAVENOUS at 10:33

## 2020-01-26 ASSESSMENT — PAIN SCALES - GENERAL
PAINLEVEL_OUTOF10: 10
PAINLEVEL_OUTOF10: 0

## 2020-01-26 NOTE — ED PROVIDER NOTES
ED Attending  CC: Bianca     Department of Emergency Medicine   ED  Provider Note  Admit Date/RoomTime: 1/26/2020 10:02 AM  ED Room: 31/31    Chief Complaint   Pharyngitis (sore thraot since this morning. history of tonsillitis )    History of Present Illness   Source of history provided by:  patient. History/Exam Limitations: none. Yudi Banerjee is a 38 y.o. old male who has a past medical history of:   Past Medical History:   Diagnosis Date    Diabetes mellitus ([facility] [address]     HIV (human immunodeficiency virus infection) ([facility] [address]     Hypertension     Neuropathy     presents to the emergency department by private vehicle, for bilateral sore throat pain, which occured 1 day(s) prior to arrival. Associated Signs & Symptoms: nothing Since onset the symptoms have been worsening. He is drinking plenty of fluids. Patient does have a past history of having a peritonsillar abscess. He also has a history of HIV. Exposed To: Streptococcus: unknown. Infectious Mononucleosis:  unknown. Symptoms:  Pain:  Yes. Muffled Voice:  Yes. Hoarse:  No.                            Difficulty with Secretions:  No.    Centor Score (MODIFIED) For Strep Pharyngitis:    Age 3-14 Years   no (0)     Age >44 Years   NO     Exudate or Swelling on Tonsils   yes (1)     Tender/Swollen Anterior Cervical Nodes   no (0)     Fever? (T > 38°C, 100.4°F)   no (0)     Absence of Cough   yes (1)   TOTAL POINTS   2   Score of Zero = Probability of Strep Pharyngitis: 1% - 2.5%. ,   No further testing nor antibiotics. Score of 1 = Probability of Strep Pharyngitis: 5% - 10%. ,   No further testing nor antibiotics. Score of 2 = Probability of Strep Phar: 11% - 17%. Culture/test all, ATB's only for positive culture results. Score of 3 = Probability of Strep Phar: 28% - 35%.    Culture/test all, ATB's only for positive culture results  Score of 4 or 5 = Probability Prescriptions    No medications on file     Electronically signed by NICHOLAS Mejias NP   DD: 1/26/20  **This report was transcribed using voice recognition software. Every effort was made to ensure accuracy; however, inadvertent computerized transcription errors may be present.   END OF ED PROVIDER NOTE      NICHOLAS Woodruff NP  01/26/20 [id number]

## 2020-01-26 NOTE — CONSULTS
OTOLARYNGOLOGY  CONSULT NOTE  1/26/2020    Physician Consulted: Dr. Giovani Leon  Reason for Consult: R PTA  Referring Physician: Dr. Johnnie FUENTES  Li Figueroa is a 38 y.o. male with hx of HIV and DM who presents for evaluation of peritonsillar abscess. Patient complains of diffuse sore throat R side > L and muffled voice worsening over the past 2 days, associated with odynophagia and dysphagia; denies fever, cough, congestion, dyspnea. Presented to ED today, CT neck showed 2.9 cm x 2.7 cm right peritonsillar abscess and ENT consulted. Patient states this is his second PTA, last PTA was I&D 1-2 months ago. Past Medical History:   Diagnosis Date    Diabetes mellitus ([facility] [address]     HIV (human immunodeficiency virus infection) ([facility] [address]     Hypertension     Neuropathy        History reviewed. No pertinent surgical history. Medications Prior to Admission:    Prior to Admission medications    Medication Sig Start Date End Date Taking? Authorizing Provider   albuterol sulfate HFA (PROAIR HFA) 108 (90 Base) MCG/ACT inhaler Inhale 2 puffs into the lungs every 6 hours as needed for Wheezing 5/30/19 1/26/20 Yes Celio Barry, DO   Respiratory Therapy Supplies (NEBULIZER/TUBING/MOUTHPIECE) KIT 1 kit by Does not apply route daily as needed (hx of asthma) 5/30/19  Yes Celio Barry, DO   glucose blood VI test strips (ACCU-CHEK ADVANTAGE TEST) strip As needed. 7/1/15  Yes Glorious Kemar, DO   cyclobenzaprine (FLEXERIL) 10 MG tablet Take 1 tablet by mouth 2 times daily as needed for Muscle spasms (may cause drowsiness) 6/7/15  Yes Eddie Hutchinson PA-C   Insulin Syringe-Needle U-100 (ULTRA-COMFORT INSULIN SYRINGE) 31G X 5/16\" 0.5 ML MISC Check fasting bs and 3 more two hours post prandial 5/12/15  Yes Valeria Young MD   omeprazole (PRILOSEC) 20 MG capsule Take 1 capsule by mouth Daily. 2/3/15  Yes Valeria Young MD   lisinopril (PRINIVIL;ZESTRIL) 40 MG tablet Take 1 tablet by mouth daily.  2/3/15  Yes Valeria Young MD hydrochlorothiazide (HYDRODIURIL) 25 MG tablet Take 0.5 tablets by mouth daily. 2/3/15  Yes Ramona Ferrera MD   metFORMIN (GLUCOPHAGE) 500 MG tablet Take 1 tablet by mouth daily (with breakfast). 2/3/15  Yes Ramona Ferrera MD   DULoxetine (CYMBALTA) 30 MG capsule  1/17/15  Yes Historical Provider, MD   REYATAZ 300 MG capsule  14  Yes Historical Provider, MD   TRUVADA 200-300 MG per tablet  14  Yes Historical Provider, MD   LITE TOUCH INS SYR .5CC/30G 30G X \" 0.5 ML MISC  14  Yes Historical Provider, MD   NORVIR 100 MG capsule  14  Yes Historical Provider, MD   insulin glargine (LANTUS) 100 UNIT/ML injection vial Inject 30 Units into the skin daily. 14  Yes Karen Farrell MD   gabapentin (NEURONTIN) 100 MG capsule Take 1 capsule by mouth 3 times daily. 14  Yes Karen Farrell MD   Accu-Chek Multiclix Lancets MISC Check fasting bs, and 3 times 2 hours post meal 14  Yes Angelic Richter MD   Alcohol Swabs (B-D SINGLE USE SWABS REGULAR) PADS Dispense 100 with 3 refills 14  Yes Angelic Richter MD   Blood Glucose Monitoring Suppl (ACCU-CHEK ADVANTAGE DIABETES) KIT Dispense one kit 14  Yes Angelic Richter MD   montelukast (SINGULAIR) 10 MG tablet Take 1 tablet by mouth nightly. 14  Yes Angelic Richter MD   [facility] Lancets Super Thin MISC  10/14/13  Yes Angelic Richter MD       Allergies   Allergen Reactions    Bee Venom     Ibuprofen      Reaction not specified     Nutritional Supplements        History reviewed. No pertinent family history.     Social History     Tobacco Use    Smoking status: Current Every Day Smoker     Packs/day: 0.25     Types: Cigarettes     Last attempt to quit: 2015     Years since quittin.6    Smokeless tobacco: Never Used   Substance Use Topics    Alcohol use: No     Alcohol/week: 0.0 standard drinks    Drug use: Yes     Types: Marijuana         Review of Systems   General ROS: negative  Hematological and Lymphatic ROS: negative  Respiratory ROS: no cough, shortness of breath, or wheezing  Cardiovascular ROS: no chest pain or dyspnea on exertion  Gastrointestinal ROS: no abdominal pain, change in bowel habits, or black or bloody stools  Genito-Urinary ROS: no dysuria, trouble voiding, or hematuria  Musculoskeletal ROS: negative      PHYSICAL EXAM:    Vitals:    20 1005   BP:    Pulse:    Resp:    Temp:    SpO2: 94%       General Appearance:  Laying bed, awake, alert, no apparent distress  Head/face:  NC/AT  Eyes: PERRL, EOMI  ENT: Enlarged, bulging right peritonsillar region. diffuse hyperemia of posterior pharynx and tonsils. tonsils without exudates. Uvula severely edematous and deviated to the left. Shotty adenopathy R >L. Trachea midline. External ears normal, nose patent. Lungs:  Non-labored, good respiratory effort, no stridor  Heart:  RR    Incision & Drainage of Peritonsillar Abscess:    Patient was anesthetized with 1%lidocaine with epinephrine in the oral cavity until the back of the throat was numb. A #11 blade scalpel was used to make a small incision in the superior lateral area of the right tonsillar pillar. A hemostat was then used to open up the insicion and the tip of the hemostat was advance into the tonsillar capsule space. Patient did have some bleeding from the incision and there was about 9cc of purulence extracted. Patient was then allowed to rinse his mouth with ice water. Patient tolerated procedure well. LABS:    CBC  Recent Labs     20  1025   WBC 12.6*   HGB 15.0   HCT 46.4        BMP  Recent Labs     20  1025      K 4.4   CL 99   CO2 26   BUN 12   CREATININE 0.9   CALCIUM 9.3     COAG's  No results for input(s): INR, PTT in the last 72 hours. Invalid input(s): PT  CALCIUM  Recent Labs     20  1025   CALCIUM 9.3     PTH  No results for input(s): PTH in the last 72 hours.     RADIOLOGY    Ct Soft Tissue Neck W Contrast    Result Date: 2020  Patient MRN:  [id number] : 1975 Age: 38 years Gender: Male Order Date:  1/26/2020 10:30 AM EXAM: CT SOFT TISSUE NECK W CONTRAST NUMBER OF IMAGES \ views:  354 INDICATION:  rule out right sided tonsillar abscess rule out right sided tonsillar abscess COMPARISON: None Technique: Low-dose CT  acquisition technique included one of following options; 1 . Automated exposure control, 2. Adjustment of MA and or KV according to patient's size or 3. Use of iterative reconstruction. The larynx appears to be closed and cannot be adequately evaluated on this study. The soft tissues appear abnormal. There is a mass effect seen in the right peritonsillar region. This measures 2.9 x 2.7 cm. There is a central fluid collection measuring 2.5 x 1.5 cm compatible with an abscess. There is diffuse thickening throughout the level of the lingual tonsils on the right. There is low density at the level the soft palate measuring 2.1 x 1.1 cm. There is edema and soft tissue swelling extending to the level of the hypopharynx with mass effect on the piriform sinus in the airway. The airway at the level of the hypopharynx and supraglottic larynx appears to be mildly narrowed. Scattered lymph nodes are visualized which do meet the CT criteria for adenopathy. . Given what is likely a peritonsillar abscess this is likely reactive     The larynx is closed and cannot be evaluated There is findings compatible with right peritonsillar abscess with what appears to be surrounding inflammatory changes extending into the soft palate and uvula and to the the level of lingual tonsils on the right Suboptimal evaluation of the larynx         ASSESSMENT:  38 y.o. male with R PTA.  This was I&D at bedside with about 9cc purulence expressed     PLAN:  Recommend  · Admission to Internal Medicine for observation secondary to immunodeficient status and severely edematous uvula   · IV clindamycin 300 mg Q8hrs   · IV decadron 10 mg Q8hrs for 3 doses   · Consult ID for antibiotic management

## 2020-01-27 LAB
BASOPHILS ABSOLUTE: 0.01 E9/L (ref 0–0.2)
BASOPHILS RELATIVE PERCENT: 0.1 % (ref 0–2)
EOSINOPHILS ABSOLUTE: 0 E9/L (ref 0.05–0.5)
EOSINOPHILS RELATIVE PERCENT: 0 % (ref 0–6)
HCT VFR BLD CALC: 46.9 % (ref 37–54)
HEMOGLOBIN: 15.2 G/DL (ref 12.5–16.5)
IMMATURE GRANULOCYTES #: 0.15 E9/L
IMMATURE GRANULOCYTES %: 1.2 % (ref 0–5)
LYMPHOCYTES ABSOLUTE: 1.09 E9/L (ref 1.5–4)
LYMPHOCYTES RELATIVE PERCENT: 8.8 % (ref 20–42)
MCH RBC QN AUTO: 29.2 PG (ref 26–35)
MCHC RBC AUTO-ENTMCNC: 32.4 % (ref 32–34.5)
MCV RBC AUTO: 90.2 FL (ref 80–99.9)
METER GLUCOSE: 280 MG/DL (ref 74–99)
METER GLUCOSE: 300 MG/DL (ref 74–99)
MONOCYTES ABSOLUTE: 0.27 E9/L (ref 0.1–0.95)
MONOCYTES RELATIVE PERCENT: 2.2 % (ref 2–12)
NEUTROPHILS ABSOLUTE: 10.92 E9/L (ref 1.8–7.3)
NEUTROPHILS RELATIVE PERCENT: 87.7 % (ref 43–80)
PDW BLD-RTO: 14.9 FL (ref 11.5–15)
PLATELET # BLD: 338 E9/L (ref 130–450)
PMV BLD AUTO: 11.2 FL (ref 7–12)
RBC # BLD: 5.2 E12/L (ref 3.8–5.8)
WBC # BLD: 12.4 E9/L (ref 4.5–11.5)

## 2020-01-27 PROCEDURE — 85025 COMPLETE CBC W/AUTO DIFF WBC: CPT

## 2020-01-27 PROCEDURE — 96376 TX/PRO/DX INJ SAME DRUG ADON: CPT

## 2020-01-27 PROCEDURE — 2580000003 HC RX 258: Performed by: INTERNAL MEDICINE

## 2020-01-27 PROCEDURE — 6360000002 HC RX W HCPCS: Performed by: INTERNAL MEDICINE

## 2020-01-27 PROCEDURE — 6370000000 HC RX 637 (ALT 250 FOR IP): Performed by: INTERNAL MEDICINE

## 2020-01-27 PROCEDURE — 96366 THER/PROPH/DIAG IV INF ADDON: CPT

## 2020-01-27 PROCEDURE — 82962 GLUCOSE BLOOD TEST: CPT

## 2020-01-27 PROCEDURE — 6370000000 HC RX 637 (ALT 250 FOR IP): Performed by: RADIOLOGY

## 2020-01-27 PROCEDURE — 99224 PR SBSQ OBSERVATION CARE/DAY 15 MINUTES: CPT | Performed by: INTERNAL MEDICINE

## 2020-01-27 PROCEDURE — G0378 HOSPITAL OBSERVATION PER HR: HCPCS

## 2020-01-27 PROCEDURE — 36415 COLL VENOUS BLD VENIPUNCTURE: CPT

## 2020-01-27 PROCEDURE — 99218 PR INITIAL OBSERVATION CARE/DAY 30 MINUTES: CPT | Performed by: OTOLARYNGOLOGY

## 2020-01-27 RX ORDER — NICOTINE POLACRILEX 4 MG
15 LOZENGE BUCCAL PRN
Status: DISCONTINUED | OUTPATIENT
Start: 2020-01-27 | End: 2020-01-27 | Stop reason: HOSPADM

## 2020-01-27 RX ORDER — DEXTROSE MONOHYDRATE 25 G/50ML
12.5 INJECTION, SOLUTION INTRAVENOUS PRN
Status: DISCONTINUED | OUTPATIENT
Start: 2020-01-27 | End: 2020-01-27 | Stop reason: HOSPADM

## 2020-01-27 RX ORDER — AMOXICILLIN AND CLAVULANATE POTASSIUM 562.5; 437.5; 62.5 MG/1; MG/1; MG/1
2 TABLET, FILM COATED, EXTENDED RELEASE ORAL EVERY 12 HOURS SCHEDULED
Qty: 40 TABLET | Refills: 0 | Status: SHIPPED | OUTPATIENT
Start: 2020-01-27 | End: 2020-02-06

## 2020-01-27 RX ORDER — DEXTROSE MONOHYDRATE 50 MG/ML
100 INJECTION, SOLUTION INTRAVENOUS PRN
Status: DISCONTINUED | OUTPATIENT
Start: 2020-01-27 | End: 2020-01-27 | Stop reason: HOSPADM

## 2020-01-27 RX ORDER — AMOXICILLIN AND CLAVULANATE POTASSIUM 562.5; 437.5; 62.5 MG/1; MG/1; MG/1
2 TABLET, FILM COATED, EXTENDED RELEASE ORAL EVERY 12 HOURS SCHEDULED
Status: DISCONTINUED | OUTPATIENT
Start: 2020-01-27 | End: 2020-01-27 | Stop reason: HOSPADM

## 2020-01-27 RX ORDER — PREDNISONE 10 MG/1
TABLET ORAL
Qty: 20 TABLET | Refills: 0 | Status: SHIPPED | OUTPATIENT
Start: 2020-01-27 | End: 2020-02-06

## 2020-01-27 RX ADMIN — LISINOPRIL 40 MG: 20 TABLET ORAL at 09:48

## 2020-01-27 RX ADMIN — RITONAVIR 100 MG: 100 TABLET, FILM COATED ORAL at 09:49

## 2020-01-27 RX ADMIN — INSULIN LISPRO 8 UNITS: 100 INJECTION, SOLUTION INTRAVENOUS; SUBCUTANEOUS at 09:51

## 2020-01-27 RX ADMIN — DULOXETINE HYDROCHLORIDE 30 MG: 30 CAPSULE, DELAYED RELEASE ORAL at 09:48

## 2020-01-27 RX ADMIN — INSULIN LISPRO 6 UNITS: 100 INJECTION, SOLUTION INTRAVENOUS; SUBCUTANEOUS at 12:24

## 2020-01-27 RX ADMIN — GABAPENTIN 100 MG: 100 CAPSULE ORAL at 09:48

## 2020-01-27 RX ADMIN — EMTRICITABINE AND TENOFOVIR DISOPROXIL FUMARATE 1 TABLET: 200; 300 TABLET, FILM COATED ORAL at 09:48

## 2020-01-27 RX ADMIN — SODIUM CHLORIDE, PRESERVATIVE FREE 10 ML: 5 INJECTION INTRAVENOUS at 09:48

## 2020-01-27 RX ADMIN — INSULIN GLARGINE 15 UNITS: 100 INJECTION, SOLUTION SUBCUTANEOUS at 09:52

## 2020-01-27 RX ADMIN — DEXAMETHASONE SODIUM PHOSPHATE 10 MG: 4 INJECTION, SOLUTION INTRAMUSCULAR; INTRAVENOUS at 02:15

## 2020-01-27 RX ADMIN — SODIUM CHLORIDE 3 G: 900 INJECTION INTRAVENOUS at 02:16

## 2020-01-27 RX ADMIN — AMOXICILLIN AND CLAVULANATE POTASSIUM 2 TABLET: 562.5; 437.5; 62.5 TABLET, MULTILAYER, EXTENDED RELEASE ORAL at 12:23

## 2020-01-27 RX ADMIN — HYDROCHLOROTHIAZIDE 12.5 MG: 12.5 TABLET ORAL at 09:48

## 2020-01-27 RX ADMIN — DEXAMETHASONE SODIUM PHOSPHATE 10 MG: 4 INJECTION, SOLUTION INTRAMUSCULAR; INTRAVENOUS at 09:48

## 2020-01-27 NOTE — CONSULTS
Department of Internal Medicine  Infectious Diseases   Consult Note      Reason for Consult:  HIV infection, Peritonsillar abscess       Requesting Physician:  Dr Adelita George:               This is a 38 yrs old male with HIV infection ( most recent CD4 count 869 and HIV viral load <20 as of July 2019 - on Descovy and Evotaz ) presented to the ER with sore throat for 2 days ,associated with odynophagia . He denied fever and chills . WBC 12.4 K , CT neck - right peritonsillar abscess . Underwent I & D of the abscess by ENT . Pt was started on IV unasayn. Feeel better thins AM .      Past Medical History:      Past Medical History:   Diagnosis Date    [facility]     [facility]     Hypertension     Neuropathy        Past Surgical History:    None     Current Medications:      Current Facility-Administered Medications   Medication Dose Route Frequency Provider Last Rate Last Dose    insulin lispro (HUMALOG) injection vial 0-12 Units  0-12 Units Subcutaneous TID  Keturah Nieto DO        insulin lispro (HUMALOG) injection vial 0-6 Units  0-6 Units Subcutaneous Nightly Keturah Nieto DO        glucose (GLUTOSE) 40 % oral gel 15 g  15 g Oral PRN Castro Mervanessa, DO        dextrose 50 % IV solution  12.5 g Intravenous PRN Castroeliezer Landis, DO        glucagon (rDNA) injection 1 mg  1 mg Intramuscular PRN Castro Mervanessa, DO        dextrose 5 % solution  100 mL/hr Intravenous PRN Castro Landis, DO        dexamethasone (DECADRON) injection 10 mg  10 mg Intravenous Q8H Keny Lechuga MD   10 mg at 01/27/20 0215    gabapentin (NEURONTIN) capsule 100 mg  100 mg Oral TID Keny Lechuga MD   100 mg at 01/26/20 2130    DULoxetine (CYMBALTA) extended release capsule 30 mg  30 mg Oral Daily Keny Lechuga MD   30 mg at 01/26/20 1901    hydrochlorothiazide (HYDRODIURIL) tablet 12.5 mg  12.5 mg Oral Daily Keny Lechuga MD   12.5 mg at 01/26/20 1901    insulin obvious deformity or drainage.    Nose:   No nasal drainage   Throat:   Mucosa moist, right tonsil enlargement,erythematous    Neck:   Supple, no lymphadenopathy   Back:     no CVA tenderness   Lungs:     Clear to auscultation bilaterally, no wheeze    Heart:    Regular rate and rhythm, no murmur, rub or gallop   Abdomen:     Soft, non-tender, bowel sounds present    Extremities:   No edema, no cyanosis ,no open wound,no erythema, no     tenderness   Pulses:   Dorsalis pedis palpable    Skin:   no rashes or lesions     CBC with Differential:      Lab Results   Component Value Date    WBC 12.4 01/27/2020    RBC 5.20 01/27/2020    HGB 15.2 01/27/2020    HCT 46.9 01/27/2020     01/27/2020    MCV 90.2 01/27/2020    MCH 29.2 01/27/2020    MCHC 32.4 01/27/2020    RDW 14.9 01/27/2020    SEGSPCT 46 01/21/2014    LYMPHOPCT 8.8 01/27/2020    MONOPCT 2.2 01/27/2020    BASOPCT 0.1 01/27/2020    MONOSABS 0.27 01/27/2020    LYMPHSABS 1.09 01/27/2020    EOSABS 0.00 01/27/2020    BASOSABS 0.01 01/27/2020       CMP     Lab Results   Component Value Date     01/26/2020    K 4.4 01/26/2020    CL 99 01/26/2020    CO2 26 01/26/2020    BUN 12 01/26/2020    CREATININE 0.9 01/26/2020    GFRAA >60 01/26/2020    LABGLOM >60 01/26/2020    GLUCOSE 198 01/26/2020    GLUCOSE 112 11/25/2010    PROT 7.8 01/26/2020    LABALBU 4.2 01/26/2020    LABALBU 4.3 11/22/2010    CALCIUM 9.3 01/26/2020    BILITOT 0.7 01/26/2020    ALKPHOS 85 01/26/2020    AST 24 01/26/2020    ALT 18 01/26/2020         Hepatic Function Panel:    Lab Results   Component Value Date    ALKPHOS 85 01/26/2020    ALT 18 01/26/2020    AST 24 01/26/2020    PROT 7.8 01/26/2020    BILITOT 0.7 01/26/2020    BILIDIR 0.3 12/19/2014    IBILI 1.0 12/19/2014    LABALBU 4.2 01/26/2020    LABALBU 4.3 11/22/2010       PT/INR:  No results found for: PROTIME, INR    TSH:    Lab Results   Component Value Date    TSH 0.867 11/05/2014       U/A:    Lab Results   Component Value Date

## 2020-01-27 NOTE — H&P
[facility]  Internal Medicine Residency Program  History and Physical    Patient:  Audra Velazquez 38 y.o. male MRN: [id number]     Date of Service: 1/26/2020    Hospital Day: 1      Chief complaint: had concerns including Pharyngitis (sore thraot since this morning. history of tonsillitis ). History of Present Illness   The patient is a 38 y.o. male past medical history of HIV, diabetes, and hypertension. Patient presents with 2-day history of dysphagia. He states that he had increased pain while swallowing. He denies any cough or sputum production. He had previously been admitted in November for similar signs and symptoms. During that admission he had treatment with clindamycin. Denies any chest pain shortness of breath, fevers, chills, nausea, diarrhea, or trouble breathing. He states he is compliant with his antiretroviral.      ED Course: In the ED, seen by ENT they performed an I&D and cultures were taken. He was given IV clindamycin 300 mg. And 1 dose of Decadron 10 mg IV. Strep screen was negative. CT soft tissue of the neck was negative for any pathology. Past Medical History:      Diagnosis Date    Diabetes mellitus ([facility]     HIV (human immunodeficiency virus infection) ([facility]     Hypertension     Neuropathy        Past Surgical History:    History reviewed. No pertinent surgical history. Medications Prior to Admission:    Prior to Admission medications    Medication Sig Start Date End Date Taking? Authorizing Provider   albuterol sulfate HFA (PROAIR HFA) 108 (90 Base) MCG/ACT inhaler Inhale 2 puffs into the lungs every 6 hours as needed for Wheezing 5/30/19 1/26/20 Yes Celio Barry, DO   Respiratory Therapy Supplies (NEBULIZER/TUBING/MOUTHPIECE) KIT 1 kit by Does not apply route daily as needed (hx of asthma) 5/30/19  Yes Celio Barry, DO   glucose blood VI test strips (ACCU-CHEK ADVANTAGE TEST) strip As needed.  7/1/15  Yes Vince Chu, DO cyclobenzaprine (FLEXERIL) 10 MG tablet Take 1 tablet by mouth 2 times daily as needed for Muscle spasms (may cause drowsiness) 6/7/15  Yes Eddie Hutchinson PA-C   Insulin Syringe-Needle U-100 (ULTRA-COMFORT INSULIN SYRINGE) 31G X 5/16\" 0.5 ML MISC Check fasting bs and 3 more two hours post prandial 5/12/15  Yes Robbie Gaines MD   omeprazole (PRILOSEC) 20 MG capsule Take 1 capsule by mouth Daily. 2/3/15  Yes Robbie Gaines MD   lisinopril (PRINIVIL;ZESTRIL) 40 MG tablet Take 1 tablet by mouth daily. 2/3/15  Yes Robbie Gaines MD   hydrochlorothiazide (HYDRODIURIL) 25 MG tablet Take 0.5 tablets by mouth daily. 2/3/15  Yes Robbie Gaines MD   metFORMIN (GLUCOPHAGE) 500 MG tablet Take 1 tablet by mouth daily (with breakfast). 2/3/15  Yes Robbie Gaines MD   DULoxetine (CYMBALTA) 30 MG capsule  1/17/15  Yes Historical Provider, MD   REYATAZ 300 MG capsule  11/18/14  Yes Historical Provider, MD   TRUVADA 200-300 MG per tablet  11/18/14  Yes Historical Provider, MD   LITE TOUCH INS SYR .5CC/30G 30G X 5/16\" 0.5 ML MISC  11/5/14  Yes Historical Provider, MD   NORVIR 100 MG capsule  11/18/14  Yes Historical Provider, MD   insulin glargine (LANTUS) 100 UNIT/ML injection vial Inject 30 Units into the skin daily. 11/5/14  Yes Rocky Day MD   gabapentin (NEURONTIN) 100 MG capsule Take 1 capsule by mouth 3 times daily. 11/5/14  Yes Rcoky Day MD   Accu-Chek Multiclix Lancets MISC Check fasting bs, and 3 times 2 hours post meal 6/2/14  Yes Ephraim Valdivia MD   Alcohol Swabs (B-D SINGLE USE SWABS REGULAR) PADS Dispense 100 with 3 refills 6/2/14  Yes Ephraim Valdivia MD   Blood Glucose Monitoring Suppl (ACCU-CHEK ADVANTAGE DIABETES) KIT Dispense one kit 6/2/14  Yes Ephraim Valdivia MD   montelukast (SINGULAIR) 10 MG tablet Take 1 tablet by mouth nightly. 6/2/14  Yes Ephraim Valdivia MD   [facility] Lancets Super Thin MISC  10/14/13  Yes Ephraim Valdivia MD       Allergies:  Bee venom;  Ibuprofen; and Nutritional supplements    Social History: Neck supple. Cardiovascular: Normal rate and regular rhythm. Pulmonary/Chest: Effort normal and breath sounds normal.   Abdominal: Soft. Bowel sounds are normal.   Musculoskeletal: Normal range of motion. Lymphadenopathy:     He has no cervical adenopathy. Neurological: He is alert and oriented to person, place, and time. Skin: Skin is warm. ·  exam  ·   Labs and Imaging Studies   Basic Labs  Recent Labs     20  1025      K 4.4   CL 99   CO2 26   BUN 12   CREATININE 0.9   GLUCOSE 198*   CALCIUM 9.3       Recent Labs     20  1025   WBC 12.6*   RBC 5.13   HGB 15.0   HCT 46.4   MCV 90.4   MCH 29.2   MCHC 32.3   RDW 15.6*      MPV 10.4           Imaging Studies:     Ct Soft Tissue Neck W Contrast    Result Date: 2020  Patient MRN:  [id number] : 1975 Age: 38 years Gender: Male Order Date:  2020 10:30 AM EXAM: CT SOFT TISSUE NECK W CONTRAST NUMBER OF IMAGES \ views:  354 INDICATION:  rule out right sided tonsillar abscess rule out right sided tonsillar abscess COMPARISON: None Technique: Low-dose CT  acquisition technique included one of following options; 1 . Automated exposure control, 2. Adjustment of MA and or KV according to patient's size or 3. Use of iterative reconstruction. The larynx appears to be closed and cannot be adequately evaluated on this study. The soft tissues appear abnormal. There is a mass effect seen in the right peritonsillar region. This measures 2.9 x 2.7 cm. There is a central fluid collection measuring 2.5 x 1.5 cm compatible with an abscess. There is diffuse thickening throughout the level of the lingual tonsils on the right. There is low density at the level the soft palate measuring 2.1 x 1.1 cm. There is edema and soft tissue swelling extending to the level of the hypopharynx with mass effect on the piriform sinus in the airway. The airway at the level of the hypopharynx and supraglottic larynx appears to be mildly narrowed.  Scattered

## 2020-01-27 NOTE — DISCHARGE SUMMARY
[address]  Discharge Summary    PCP: Catina Palumbo MD    Admit Date:1/26/2020  Discharge Date: 1/27/2020    Admission Diagnosis:   1. Peritonsillar abscess  2. HIV   3. DM type II  4. HTN  5. Neuropathy      Discharge Diagnosis:  6. Peritonsillar abcess- RESOLVED  7. HIV- STABLE  8. DM type II-STABLE  9. HTN-STABLE  10. Neuropathy- STABLE      Hospital Course: The patient is a 38 y.o. male past medical history of HIV, diabetes, and hypertension. Patient presents with 2-day history of dysphagia. He states that he had increased pain while swallowing. He had previously been admitted in November for similar signs and symptoms. During that admission he had treatment with clindamycin. Denies any chest pain shortness of breath, fevers, chills, nausea, diarrhea, or trouble breathing. He states he is compliant with his antiretroviral.In the ED, seen by ENT they performed an I&D and cultures were taken. He was given IV clindamycin 300 mg. And 1 dose of Decadron 10 mg IV. Strep screen was negative. CT soft tissue of the neck was negative for any pathology. He was admitted to the hospital for further management. He received three doses of IV dexamethasone 10mg. His blood sugars were elevated during his hospital stay most likely due to steroid use. We consulted infectious disease and the patient was transitioned to Unasyn and then Augmentin to be taken for 10 days after discharge. ENT also recommended that the patient follow up outpatient with  Dr. Anjum Valenzuela for outpatient tonsillectomy. They also recommended he be discharged with prednisone 40mg for 5 days. He is to follow with Dr. Flavia Ewing for his HIV to 2 weeks. He was discharged in a stable state. Significant findings (history and exam, laboratory, radiological, pathology, other tests):   Physical Exam  Constitutional:       Appearance: He is normal weight. HENT:      Head: Normocephalic and atraumatic. Nose: Nose normal.      Mouth/Throat:      Mouth: Mucous membranes are moist.      Dentition: Dental abscesses present. Pharynx: Oropharynx is clear. Posterior oropharyngeal erythema and uvula swelling present. Tonsils: Swellin+ on the right. Neck:      Musculoskeletal: Normal range of motion and neck supple. Cardiovascular:      Rate and Rhythm: Normal rate and regular rhythm. Pulmonary:      Effort: Pulmonary effort is normal.      Breath sounds: Normal breath sounds. Abdominal:      General: Abdomen is flat. Bowel sounds are normal.      Palpations: Abdomen is soft. Musculoskeletal: Normal range of motion. Skin:     General: Skin is warm. Neurological:      Mental Status: He is alert. Consults:  1. Infectious disease  2. ENT      Procedures:  1. I&D    Condition at discharge: Stable    Disposition: home    Discharge Medications:  Current Discharge Medication List      START taking these medications    Details   amoxicillin-clavulanate (AUGMENTIN XR) 1000-62.5 MG per extended release tablet Take 2 tablets by mouth every 12 hours for 10 days  Qty: 40 tablet, Refills: 0      predniSONE (DELTASONE) 10 MG tablet Take 40mg po qd x 5 days  QS for 5 days  Qty: 20 tablet, Refills: 0         CONTINUE these medications which have NOT CHANGED    Details   albuterol sulfate HFA (PROAIR HFA) 108 (90 Base) MCG/ACT inhaler Inhale 2 puffs into the lungs every 6 hours as needed for Wheezing  Qty: 1 Inhaler, Refills: 0      Respiratory Therapy Supplies (NEBULIZER/TUBING/MOUTHPIECE) KIT 1 kit by Does not apply route daily as needed (hx of asthma)  Qty: 1 kit, Refills: 0      glucose blood VI test strips (ACCU-CHEK ADVANTAGE TEST) strip As needed.   Qty: 100 each, Refills: 11      cyclobenzaprine (FLEXERIL) 10 MG tablet Take 1 tablet by mouth 2 times daily as needed for Muscle spasms (may cause drowsiness)  Qty: 14 tablet, Refills: 0      Insulin Syringe-Needle U-100 (ULTRA-COMFORT INSULIN SYRINGE) 31G X 5/16\" 0.5 ML MISC Check fasting bs and 3 more two hours post prandial  Qty: 100 each, Refills: 3      omeprazole (PRILOSEC) 20 MG capsule Take 1 capsule by mouth Daily. Qty: 30 capsule, Refills: 5      lisinopril (PRINIVIL;ZESTRIL) 40 MG tablet Take 1 tablet by mouth daily. Qty: 30 tablet, Refills: 5    Associated Diagnoses: HTN (hypertension); Diabetes mellitus (HCC)      hydrochlorothiazide (HYDRODIURIL) 25 MG tablet Take 0.5 tablets by mouth daily. Qty: 30 tablet, Refills: 2    Associated Diagnoses: HTN (hypertension)      metFORMIN (GLUCOPHAGE) 500 MG tablet Take 1 tablet by mouth daily (with breakfast). Qty: 30 tablet, Refills: 5    Associated Diagnoses: Diabetes mellitus (HCC)      DULoxetine (CYMBALTA) 30 MG capsule Refills: 0      REYATAZ 300 MG capsule Refills: 3      TRUVADA 200-300 MG per tablet Refills: 3      LITE TOUCH INS SYR .5CC/30G 30G X 5/16\" 0.5 ML MISC Refills: 3      NORVIR 100 MG capsule Refills: 3      insulin glargine (LANTUS) 100 UNIT/ML injection vial Inject 30 Units into the skin daily. Qty: 1 vial, Refills: 10      gabapentin (NEURONTIN) 100 MG capsule Take 1 capsule by mouth 3 times daily. Qty: 90 capsule, Refills: 3    Associated Diagnoses: Diabetic neuropathy ([address]      ! ! Accu-Chek Multiclix Lancets MISC Check fasting bs, and 3 times 2 hours post meal  Qty: 100 each, Refills: 3      Alcohol Swabs (B-D SINGLE USE SWABS REGULAR) PADS Dispense 100 with 3 refills  Qty: 100 each, Refills: 3      Blood Glucose Monitoring Suppl (ACCU-CHEK ADVANTAGE DIABETES) KIT Dispense one kit  Qty: 1 kit, Refills: 0      montelukast (SINGULAIR) 10 MG tablet Take 1 tablet by mouth nightly. Qty: 30 tablet, Refills: 3      !! GNP Lancets Super Thin MISC Qty: 100 each, Refills: 3       !! - Potential duplicate medications found. Please discuss with provider.       STOP taking these medications       Magic Mouthwash (MIRACLE MOUTHWASH) Comments:   Reason for Stopping:         ibuprofen (ADVIL;MOTRIN) 600 MG tablet Comments:   Reason for Stopping:         Promethazine HCl 6.25 MG/5ML SOLN Comments:   Reason for Stopping:               Special Instructions: With Dr Alivia Metz MD in 1 week for hospital follow up  [facility] in 1-2 weeks With Dr. Roshan Alba in office in 2 weeks     Activity: activity as tolerated  Diet: diabetic diet      Hospital Follow up: With Dr Alivia Metz MD in 1 week for hospital follow up  [facility] in 1-2 weeks With Dr. Roshan Alba in office in 2 weeks     Be compliant with your medications and take them as prescribed. Other than any new prescriptions given to you today, the list of home going meds on this After Visit Summary are based on information provided to us from you. This information, including the list, dose, and frequency of medications is only as accurate as the information you provided. If you have any questions or concerns about your home  medications, please contact your Primary Care Physician for further clarification.           Maico Rain, DO  PGY 1   1:40 PM 1/27/2020

## 2020-01-27 NOTE — PROGRESS NOTES
[facility] answering service made aware of consult    Chad Rogers RN
diabetes, hypertension, neuropathy, and recurrent peritonisilar abscesses. Patient presented to the ED on 1/26/2020 with one day of dysphasia and odynophagia that worsened few hours before presenting to the ED. He denies any cough or sputum production. He had previously been admitted in November for similar signs and symptoms. He states he is compliant with his antiretroviral. In the ED was started on clindamycin. Today, the patient wsa seen with the IM team. He reports that he feels better compared to yesterday. He reports very mild odynophagia and states that he can tolerate oral intake with minimal difficulty. Patient denies any chest pain shortness of breath, fevers, chills, nausea, diarrhea, or trouble breathing.      Peritonsillar abscess  -She had 2-day history of dysphagia  -Patient was seen in the ED by ENT  -Follow-up with I&D cultures  -Patient had 3 doses of 10 mg of IV Decadron  -ENT recommended outpatient tonsillectomy  -ID recommended discharging the patient on Augmentin 2 mg BID for 10 days     HIV  -Patient complient with meds  -Infectious disease was consulted  -atazanavir 300mg, norvir 100mg, and truvada 200-300mg         Diabetes type 2   - Discharge on home meds   - Hold metformin till tomorrow morning due to IV contrast    Hypertension  -Continue lisinopril 40 mg       Neuropathy  -Continue with gabapentin 100 mg 3 times a day and Cymbalta 30 mg            Discharge home today afternoon    AMY CameronII  Attending physician: Dr. Patrick Linton

## 2020-04-02 ENCOUNTER — APPOINTMENT (OUTPATIENT)
Dept: GENERAL RADIOLOGY | Age: 45
DRG: 751 | End: 2020-04-02
Payer: COMMERCIAL

## 2020-04-02 ENCOUNTER — HOSPITAL ENCOUNTER (INPATIENT)
Age: 45
LOS: 6 days | Discharge: HOME | DRG: 751 | End: 2020-04-08
Attending: EMERGENCY MEDICINE | Admitting: PSYCHIATRY & NEUROLOGY
Payer: COMMERCIAL

## 2020-04-02 PROBLEM — F33.1 MDD (MAJOR DEPRESSIVE DISORDER), RECURRENT EPISODE, MODERATE (HCC): Status: ACTIVE | Noted: 2020-04-02

## 2020-04-02 PROBLEM — F10.20 ALCOHOL DEPENDENCE (HCC): Status: ACTIVE | Noted: 2020-04-02

## 2020-04-02 PROBLEM — R45.851 SUICIDAL IDEATIONS: Status: ACTIVE | Noted: 2020-04-02

## 2020-04-02 PROBLEM — F19.94 SUBSTANCE INDUCED MOOD DISORDER (HCC): Status: ACTIVE | Noted: 2020-04-02

## 2020-04-02 PROBLEM — F14.10 COCAINE ABUSE (HCC): Status: ACTIVE | Noted: 2020-04-02

## 2020-04-02 LAB
ACETAMINOPHEN LEVEL: <5 MCG/ML (ref 10–30)
ALBUMIN SERPL-MCNC: 4.1 G/DL (ref 3.5–5.2)
ALP BLD-CCNC: 73 U/L (ref 40–129)
ALT SERPL-CCNC: 40 U/L (ref 0–40)
AMPHETAMINE SCREEN, URINE: NOT DETECTED
ANION GAP SERPL CALCULATED.3IONS-SCNC: 16 MMOL/L (ref 7–16)
AST SERPL-CCNC: 30 U/L (ref 0–39)
BACTERIA: ABNORMAL /HPF
BARBITURATE SCREEN URINE: NOT DETECTED
BASOPHILS ABSOLUTE: 0.07 E9/L (ref 0–0.2)
BASOPHILS RELATIVE PERCENT: 0.8 % (ref 0–2)
BENZODIAZEPINE SCREEN, URINE: NOT DETECTED
BETA-HYDROXYBUTYRATE: 0.46 MMOL/L (ref 0.02–0.27)
BILIRUB SERPL-MCNC: 0.8 MG/DL (ref 0–1.2)
BILIRUBIN URINE: NEGATIVE
BLOOD, URINE: ABNORMAL
BUN BLDV-MCNC: 17 MG/DL (ref 6–20)
CALCIUM SERPL-MCNC: 9.2 MG/DL (ref 8.6–10.2)
CANNABINOID SCREEN URINE: NOT DETECTED
CHLORIDE BLD-SCNC: 100 MMOL/L (ref 98–107)
CHP ED QC CHECK: NORMAL
CLARITY: CLEAR
CO2: 21 MMOL/L (ref 22–29)
COCAINE METABOLITE SCREEN URINE: POSITIVE
COLOR: YELLOW
CREAT SERPL-MCNC: 1 MG/DL (ref 0.7–1.2)
D DIMER: <200 NG/ML DDU
EKG ATRIAL RATE: 105 BPM
EKG P AXIS: 66 DEGREES
EKG P-R INTERVAL: 114 MS
EKG Q-T INTERVAL: 348 MS
EKG QRS DURATION: 96 MS
EKG QTC CALCULATION (BAZETT): 459 MS
EKG R AXIS: 69 DEGREES
EKG T AXIS: -28 DEGREES
EKG VENTRICULAR RATE: 105 BPM
EOSINOPHILS ABSOLUTE: 0.07 E9/L (ref 0.05–0.5)
EOSINOPHILS RELATIVE PERCENT: 0.8 % (ref 0–6)
ETHANOL: <10 MG/DL (ref 0–0.08)
FENTANYL SCREEN, URINE: NOT DETECTED
GFR AFRICAN AMERICAN: >60
GFR NON-AFRICAN AMERICAN: >60 ML/MIN/1.73
GLUCOSE BLD-MCNC: 240 MG/DL
GLUCOSE BLD-MCNC: 261 MG/DL (ref 74–99)
GLUCOSE URINE: >=1000 MG/DL
HCT VFR BLD CALC: 43.9 % (ref 37–54)
HEMOGLOBIN: 14.5 G/DL (ref 12.5–16.5)
IMMATURE GRANULOCYTES #: 0.02 E9/L
IMMATURE GRANULOCYTES %: 0.2 % (ref 0–5)
KETONES, URINE: 15 MG/DL
LACTIC ACID: 2 MMOL/L (ref 0.5–2.2)
LEUKOCYTE ESTERASE, URINE: NEGATIVE
LYMPHOCYTES ABSOLUTE: 2.67 E9/L (ref 1.5–4)
LYMPHOCYTES RELATIVE PERCENT: 30.4 % (ref 20–42)
Lab: ABNORMAL
MAGNESIUM: 1.8 MG/DL (ref 1.6–2.6)
MCH RBC QN AUTO: 29.4 PG (ref 26–35)
MCHC RBC AUTO-ENTMCNC: 33 % (ref 32–34.5)
MCV RBC AUTO: 89 FL (ref 80–99.9)
METER GLUCOSE: 240 MG/DL (ref 74–99)
METHADONE SCREEN, URINE: NOT DETECTED
MONOCYTES ABSOLUTE: 0.77 E9/L (ref 0.1–0.95)
MONOCYTES RELATIVE PERCENT: 8.8 % (ref 2–12)
NEUTROPHILS ABSOLUTE: 5.19 E9/L (ref 1.8–7.3)
NEUTROPHILS RELATIVE PERCENT: 59 % (ref 43–80)
NITRITE, URINE: NEGATIVE
OPIATE SCREEN URINE: NOT DETECTED
OXYCODONE URINE: NOT DETECTED
PDW BLD-RTO: 14.3 FL (ref 11.5–15)
PH UA: 6 (ref 5–9)
PHENCYCLIDINE SCREEN URINE: NOT DETECTED
PLATELET # BLD: 305 E9/L (ref 130–450)
PMV BLD AUTO: 11 FL (ref 7–12)
POTASSIUM SERPL-SCNC: 4.6 MMOL/L (ref 3.5–5)
PRO-BNP: 82 PG/ML (ref 0–125)
PROTEIN UA: NEGATIVE MG/DL
RBC # BLD: 4.93 E12/L (ref 3.8–5.8)
RBC UA: ABNORMAL /HPF (ref 0–2)
SALICYLATE, SERUM: <0.3 MG/DL (ref 0–30)
SODIUM BLD-SCNC: 137 MMOL/L (ref 132–146)
SPECIFIC GRAVITY UA: 1.02 (ref 1–1.03)
TOTAL PROTEIN: 6.9 G/DL (ref 6.4–8.3)
TRICYCLIC ANTIDEPRESSANTS SCREEN SERUM: NEGATIVE NG/ML
TROPONIN: <0.01 NG/ML (ref 0–0.03)
UROBILINOGEN, URINE: 0.2 E.U./DL
WBC # BLD: 8.8 E9/L (ref 4.5–11.5)
WBC UA: ABNORMAL /HPF (ref 0–5)

## 2020-04-02 PROCEDURE — 80307 DRUG TEST PRSMV CHEM ANLYZR: CPT

## 2020-04-02 PROCEDURE — 81001 URINALYSIS AUTO W/SCOPE: CPT

## 2020-04-02 PROCEDURE — 83735 ASSAY OF MAGNESIUM: CPT

## 2020-04-02 PROCEDURE — 82010 KETONE BODYS QUAN: CPT

## 2020-04-02 PROCEDURE — 83605 ASSAY OF LACTIC ACID: CPT

## 2020-04-02 PROCEDURE — 6370000000 HC RX 637 (ALT 250 FOR IP): Performed by: INTERNAL MEDICINE

## 2020-04-02 PROCEDURE — 82962 GLUCOSE BLOOD TEST: CPT

## 2020-04-02 PROCEDURE — 71045 X-RAY EXAM CHEST 1 VIEW: CPT

## 2020-04-02 PROCEDURE — 85025 COMPLETE CBC W/AUTO DIFF WBC: CPT

## 2020-04-02 PROCEDURE — 2580000003 HC RX 258: Performed by: NURSE PRACTITIONER

## 2020-04-02 PROCEDURE — 93010 ELECTROCARDIOGRAM REPORT: CPT | Performed by: INTERNAL MEDICINE

## 2020-04-02 PROCEDURE — 80053 COMPREHEN METABOLIC PANEL: CPT

## 2020-04-02 PROCEDURE — 87536 HIV-1 QUANT&REVRSE TRNSCRPJ: CPT

## 2020-04-02 PROCEDURE — 86360 T CELL ABSOLUTE COUNT/RATIO: CPT

## 2020-04-02 PROCEDURE — 99222 1ST HOSP IP/OBS MODERATE 55: CPT | Performed by: NURSE PRACTITIONER

## 2020-04-02 PROCEDURE — 86359 T CELLS TOTAL COUNT: CPT

## 2020-04-02 PROCEDURE — 1240000000 HC EMOTIONAL WELLNESS R&B

## 2020-04-02 PROCEDURE — 6370000000 HC RX 637 (ALT 250 FOR IP): Performed by: NURSE PRACTITIONER

## 2020-04-02 PROCEDURE — 84484 ASSAY OF TROPONIN QUANT: CPT

## 2020-04-02 PROCEDURE — 93005 ELECTROCARDIOGRAM TRACING: CPT | Performed by: NURSE PRACTITIONER

## 2020-04-02 PROCEDURE — G0480 DRUG TEST DEF 1-7 CLASSES: HCPCS

## 2020-04-02 PROCEDURE — 85378 FIBRIN DEGRADE SEMIQUANT: CPT

## 2020-04-02 PROCEDURE — 83880 ASSAY OF NATRIURETIC PEPTIDE: CPT

## 2020-04-02 PROCEDURE — 99285 EMERGENCY DEPT VISIT HI MDM: CPT

## 2020-04-02 RX ORDER — DIVALPROEX SODIUM 250 MG/1
250 TABLET, DELAYED RELEASE ORAL EVERY 12 HOURS SCHEDULED
Status: COMPLETED | OUTPATIENT
Start: 2020-04-02 | End: 2020-04-04

## 2020-04-02 RX ORDER — CITALOPRAM 20 MG/1
10 TABLET ORAL DAILY
Status: DISCONTINUED | OUTPATIENT
Start: 2020-04-02 | End: 2020-04-05

## 2020-04-02 RX ORDER — SODIUM CHLORIDE 0.9 % (FLUSH) 0.9 %
10 SYRINGE (ML) INJECTION EVERY 12 HOURS SCHEDULED
Status: DISCONTINUED | OUTPATIENT
Start: 2020-04-02 | End: 2020-04-08 | Stop reason: HOSPADM

## 2020-04-02 RX ORDER — 0.9 % SODIUM CHLORIDE 0.9 %
1000 INTRAVENOUS SOLUTION INTRAVENOUS ONCE
Status: COMPLETED | OUTPATIENT
Start: 2020-04-02 | End: 2020-04-02

## 2020-04-02 RX ORDER — PROMETHAZINE HYDROCHLORIDE 25 MG/1
12.5 TABLET ORAL EVERY 6 HOURS PRN
Status: DISCONTINUED | OUTPATIENT
Start: 2020-04-02 | End: 2020-04-02

## 2020-04-02 RX ORDER — ALBUTEROL SULFATE 2.5 MG/3ML
2.5 SOLUTION RESPIRATORY (INHALATION) EVERY 6 HOURS PRN
Status: DISCONTINUED | OUTPATIENT
Start: 2020-04-02 | End: 2020-04-08 | Stop reason: HOSPADM

## 2020-04-02 RX ORDER — MAGNESIUM HYDROXIDE/ALUMINUM HYDROXICE/SIMETHICONE 120; 1200; 1200 MG/30ML; MG/30ML; MG/30ML
30 SUSPENSION ORAL PRN
Status: DISCONTINUED | OUTPATIENT
Start: 2020-04-02 | End: 2020-04-08 | Stop reason: HOSPADM

## 2020-04-02 RX ORDER — RITONAVIR 100 MG/1
100 TABLET ORAL DAILY
Status: DISCONTINUED | OUTPATIENT
Start: 2020-04-02 | End: 2020-04-08 | Stop reason: HOSPADM

## 2020-04-02 RX ORDER — HYDROXYZINE PAMOATE 50 MG/1
50 CAPSULE ORAL 3 TIMES DAILY PRN
Status: DISCONTINUED | OUTPATIENT
Start: 2020-04-02 | End: 2020-04-08 | Stop reason: HOSPADM

## 2020-04-02 RX ORDER — MULTIVITAMIN WITH FOLIC ACID 400 MCG
1 TABLET ORAL DAILY
Status: DISCONTINUED | OUTPATIENT
Start: 2020-04-02 | End: 2020-04-08 | Stop reason: HOSPADM

## 2020-04-02 RX ORDER — CHLORDIAZEPOXIDE HYDROCHLORIDE 25 MG/1
25 CAPSULE, GELATIN COATED ORAL EVERY 6 HOURS PRN
Status: DISCONTINUED | OUTPATIENT
Start: 2020-04-02 | End: 2020-04-02

## 2020-04-02 RX ORDER — HALOPERIDOL 5 MG/ML
5 INJECTION INTRAMUSCULAR EVERY 6 HOURS PRN
Status: DISCONTINUED | OUTPATIENT
Start: 2020-04-02 | End: 2020-04-08 | Stop reason: HOSPADM

## 2020-04-02 RX ORDER — ALBUTEROL SULFATE 90 UG/1
2 AEROSOL, METERED RESPIRATORY (INHALATION) EVERY 6 HOURS PRN
Status: DISCONTINUED | OUTPATIENT
Start: 2020-04-02 | End: 2020-04-02 | Stop reason: CLARIF

## 2020-04-02 RX ORDER — FOLIC ACID 1 MG/1
1 TABLET ORAL DAILY
Status: DISCONTINUED | OUTPATIENT
Start: 2020-04-02 | End: 2020-04-08 | Stop reason: HOSPADM

## 2020-04-02 RX ORDER — SODIUM CHLORIDE 0.9 % (FLUSH) 0.9 %
10 SYRINGE (ML) INJECTION PRN
Status: DISCONTINUED | OUTPATIENT
Start: 2020-04-02 | End: 2020-04-08 | Stop reason: HOSPADM

## 2020-04-02 RX ORDER — TRAZODONE HYDROCHLORIDE 50 MG/1
50 TABLET ORAL NIGHTLY PRN
Status: DISCONTINUED | OUTPATIENT
Start: 2020-04-02 | End: 2020-04-02

## 2020-04-02 RX ORDER — LANOLIN ALCOHOL/MO/W.PET/CERES
3 CREAM (GRAM) TOPICAL NIGHTLY PRN
Status: DISCONTINUED | OUTPATIENT
Start: 2020-04-02 | End: 2020-04-08 | Stop reason: HOSPADM

## 2020-04-02 RX ORDER — THIAMINE MONONITRATE (VIT B1) 100 MG
100 TABLET ORAL DAILY
Status: DISCONTINUED | OUTPATIENT
Start: 2020-04-02 | End: 2020-04-08 | Stop reason: HOSPADM

## 2020-04-02 RX ORDER — ATAZANAVIR 300 MG/1
300 CAPSULE ORAL DAILY
Status: DISCONTINUED | OUTPATIENT
Start: 2020-04-02 | End: 2020-04-08 | Stop reason: HOSPADM

## 2020-04-02 RX ORDER — CHLORDIAZEPOXIDE HYDROCHLORIDE 25 MG/1
25 CAPSULE, GELATIN COATED ORAL EVERY 4 HOURS
Status: DISCONTINUED | OUTPATIENT
Start: 2020-04-02 | End: 2020-04-03

## 2020-04-02 RX ORDER — HALOPERIDOL 5 MG
5 TABLET ORAL EVERY 6 HOURS PRN
Status: DISCONTINUED | OUTPATIENT
Start: 2020-04-02 | End: 2020-04-08 | Stop reason: HOSPADM

## 2020-04-02 RX ORDER — ONDANSETRON 2 MG/ML
4 INJECTION INTRAMUSCULAR; INTRAVENOUS EVERY 6 HOURS PRN
Status: DISCONTINUED | OUTPATIENT
Start: 2020-04-02 | End: 2020-04-02

## 2020-04-02 RX ADMIN — RITONAVIR 100 MG: 100 TABLET ORAL at 21:17

## 2020-04-02 RX ADMIN — DIVALPROEX SODIUM 250 MG: 250 TABLET, DELAYED RELEASE ORAL at 10:00

## 2020-04-02 RX ADMIN — ATAZANAVIR 300 MG: 300 CAPSULE, GELATIN COATED ORAL at 21:19

## 2020-04-02 RX ADMIN — EMTRICITABINE AND TENOFOVIR ALAFENAMIDE 1 TABLET: 200; 25 TABLET ORAL at 21:17

## 2020-04-02 RX ADMIN — MELATONIN 3 MG ORAL TABLET 3 MG: 3 TABLET ORAL at 21:16

## 2020-04-02 RX ADMIN — CHLORDIAZEPOXIDE HYDROCHLORIDE 25 MG: 25 CAPSULE ORAL at 16:28

## 2020-04-02 RX ADMIN — CHLORDIAZEPOXIDE HYDROCHLORIDE 25 MG: 25 CAPSULE ORAL at 21:16

## 2020-04-02 RX ADMIN — FOLIC ACID 1 MG: 1 TABLET ORAL at 10:00

## 2020-04-02 RX ADMIN — DIVALPROEX SODIUM 250 MG: 250 TABLET, DELAYED RELEASE ORAL at 21:16

## 2020-04-02 RX ADMIN — Medication 100 MG: at 10:00

## 2020-04-02 RX ADMIN — SODIUM CHLORIDE 1000 ML: 9 INJECTION, SOLUTION INTRAVENOUS at 01:05

## 2020-04-02 RX ADMIN — CHLORDIAZEPOXIDE HYDROCHLORIDE 25 MG: 25 CAPSULE ORAL at 10:01

## 2020-04-02 RX ADMIN — MULTIVITAMIN TABLET 1 TABLET: TABLET at 10:00

## 2020-04-02 RX ADMIN — CITALOPRAM 10 MG: 20 TABLET, FILM COATED ORAL at 10:00

## 2020-04-02 ASSESSMENT — SLEEP AND FATIGUE QUESTIONNAIRES
DIFFICULTY FALLING ASLEEP: YES
DO YOU USE A SLEEP AID: YES
SLEEP PATTERN: DIFFICULTY FALLING ASLEEP
DO YOU HAVE DIFFICULTY SLEEPING: YES
DIFFICULTY ARISING: NO
AVERAGE NUMBER OF SLEEP HOURS: 6
RESTFUL SLEEP: NO
DO YOU HAVE DIFFICULTY SLEEPING: YES
DO YOU USE A SLEEP AID: NO
DIFFICULTY FALLING ASLEEP: NO
RESTFUL SLEEP: NO
DIFFICULTY STAYING ASLEEP: NO
DIFFICULTY STAYING ASLEEP: NO
SLEEP PATTERN: DIFFICULTY FALLING ASLEEP

## 2020-04-02 ASSESSMENT — PAIN DESCRIPTION - DESCRIPTORS: DESCRIPTORS: ACHING

## 2020-04-02 ASSESSMENT — PAIN SCALES - GENERAL: PAINLEVEL_OUTOF10: 10

## 2020-04-02 ASSESSMENT — PAIN DESCRIPTION - ORIENTATION: ORIENTATION: RIGHT;LEFT

## 2020-04-02 ASSESSMENT — PAIN DESCRIPTION - LOCATION: LOCATION: FOOT

## 2020-04-02 ASSESSMENT — PAIN DESCRIPTION - FREQUENCY: FREQUENCY: CONTINUOUS

## 2020-04-02 ASSESSMENT — PATIENT HEALTH QUESTIONNAIRE - PHQ9: SUM OF ALL RESPONSES TO PHQ QUESTIONS 1-9: 16

## 2020-04-02 ASSESSMENT — PAIN DESCRIPTION - PAIN TYPE: TYPE: CHRONIC PAIN

## 2020-04-02 ASSESSMENT — LIFESTYLE VARIABLES: HISTORY_ALCOHOL_USE: YES

## 2020-04-02 NOTE — ED NOTES
Bed: 20  Expected date:   Expected time:   Means of arrival:   Comments:  triage     India Goldberg RN  04/02/20 [id number]

## 2020-04-02 NOTE — ED NOTES
Pt states he has been without his meds for a \"few weeks\". Pt states his doctor is too far away (in [address] States he didn't have time to have them delivered to the house. Pt arrived to hospital eating [employer] Pt also c/o moist cough. Pt states he has SI with no specific plan, denies HI. Pt aware urine spec is needed.       Carrol Baum RN  04/02/20 [id number]

## 2020-04-02 NOTE — ED PROVIDER NOTES
ED Physician   HPI:  4/2/20, Time: 12:38 AM EDT         Anna Rolle is a 54 y.o. male presenting to the ED for multiple concerns. Patient presents to the emergency department with concerns regarding being out of his medicine for 1 week. States that he has not had his insulin, oral hypoglycemics and Neurontin. Patient reports that since he has not taken his Neurontin his feet are hurting him. No wounds noted. patient reports that he has been checking his blood sugar and it has been running around 2-300. Patient also with primary history of HIV. Patient is also reporting that he has been feeling very suicidal with plan to overdose on medication. He also reports that he is having  auditory hallucinations, states that he is hearing his grandmother. Patient denies any homicidal ideations. States that he does not follow-up with anyone for psychiatric services. Patient is denying any drug or alcohol use. Denies any chest pain, shortness of breath or abdominal pain also denies any nausea, vomiting or diarrhea. Patient also denies any fevers. Patient Noted to be eating [employer] on arrival here to the emergency department. Patient is denying any drug or alcohol use. Review of Systems:   Pertinent positives and negatives are stated within HPI, all other systems reviewed and are negative.          --------------------------------------------- PAST HISTORY ---------------------------------------------  Past Medical History:  has a past medical history of Diabetes mellitus ([facility] Hypertension, and Neuropathy. Past Surgical History:  has no past surgical history on file. Social History:  reports that he has been smoking cigarettes. He has been smoking about 0.25 packs per day. He has never used smokeless tobacco. He reports current alcohol use. He reports current drug use. Drugs: Marijuana and Cocaine.     Family History: family history is not on

## 2020-04-02 NOTE — CARE COORDINATION
made referral to [facility] Draper of [facility]  Michelle Draper met with pt and recommend inpatient programs:    [facility]  Faxed information to [phone]   No beds available for the next 2 days;   Phone  [phone] recovery faxed information to  [phone] to review for placement  Phone  [phone]    [facility]  [phone] will review no beds currently    Phone number  [phone]    Elena Buttsn faxed information to [phone]  No beds today  Phone  [address] [facility] [address] information  [facility]  Fax [phone]  Phone  [phone]

## 2020-04-02 NOTE — ED NOTES
DOCTOR FARHAT Galindo. PT ACCEPTED FOR PSYCHIATRIC ADMISSION TO [address] [place] [address].      ROOM 6516    Patriciabury, RN  04/02/20 6346

## 2020-04-02 NOTE — PROGRESS NOTES
Necklace(1 bracelet, 1 necklace)  Body Piercings Removed: N/A  Clothing: Footwear, Jacket / coat, Pants, Shirt(1 pr shoes, 1 jacket, 1 pr pants, 1 t shirt)  Were All Patient Medications Collected?: Not Applicable  Other Valuables: Cell phone, Other (Comment)(1 cell phone, 1 lighter)     . Patient oriented to surroundings and program expectations and copy of patient rights given. Received admission packet:  . Consents reviewed, signed . Keshia Kahn Patient verbalize understanding:  .     Patient education on precautions:                    Denis Thompson RN

## 2020-04-02 NOTE — ED NOTES
Assessment, CSSRS and SBIRT complete    Pt is pink slipped by ED Doc    Pt admits to SI w/ plan to OD on medications, + auditory hallucinations, denies HI and visual hallucinations    Pt reports increased depression with feelings of helplessness/hopelessness due to being isolated and missing his  wife and grandmother. Pt reports he hears his grandmother talking to him. Pt has a MH hx of BiPolar and depression. Pt denies MH meds and is not connected with outpatient [place] [address] services. Pt reports  he was hospitalized at another facility for I and when he ran out of meds he stopped. Pt admits to daily drinking of beer and liquer until he blacks out and used cocaine all day today. Pt is calm, oriented x 4, alert, congruent affect, good eye contact, low speech pattern, admits to SI w/ plan to OD on medications, auditory hallucinations of Grandmother talking to him, denies HI and visual hallucinations. Pt reports poor sleep and ok appetite. KACY SW reviewed chart with ED Doc, Pt in need of inpatient admission to ensure safety and stabilization. Pt to be reviewed for admission after labs are completed and medically clear.      Anila Laura, FRANCISCO, Irene Nugent  [phone]

## 2020-04-02 NOTE — H&P
daily.  hydrochlorothiazide (HYDRODIURIL) 25 MG tablet, Take 0.5 tablets by mouth daily. metFORMIN (GLUCOPHAGE) 500 MG tablet, Take 1 tablet by mouth daily (with breakfast). DULoxetine (CYMBALTA) 30 MG capsule,   REYATAZ 300 MG capsule,   TRUVADA 200-300 MG per tablet,   LITE TOUCH INS SYR .5CC/30G 30G X 5/16\" 0.5 ML MISC,   NORVIR 100 MG capsule,   insulin glargine (LANTUS) 100 UNIT/ML injection vial, Inject 30 Units into the skin daily. gabapentin (NEURONTIN) 100 MG capsule, Take 1 capsule by mouth 3 times daily. Accu-Chek Multiclix Lancets MISC, Check fasting bs, and 3 times 2 hours post meal  Alcohol Swabs (B-D SINGLE USE SWABS REGULAR) PADS, Dispense 100 with 3 refills  Blood Glucose Monitoring Suppl (ACCU-CHEK ADVANTAGE DIABETES) KIT, Dispense one kit  montelukast (SINGULAIR) 10 MG tablet, Take 1 tablet by mouth nightly. GNP Lancets Super Thin MISC,     Past Surgical History:    History reviewed. No pertinent surgical history. Allergies:   Bee venom; Ibuprofen; and Nutritional supplements    Family History  History reviewed. No pertinent family history. EXAMINATION:    REVIEW OF SYSTEMS:    ROS:  [x] All negative/unchanged except if checked.  Explain positive(checked items) below:  [] Constitutional  [] Eyes  [] Ear/Nose/Mouth/Throat  [] Respiratory  [] CV  [] GI  []   [] Musculoskeletal  [] Skin/Breast  [] Neurological  [] Endocrine  [] Heme/Lymph  [] Allergic/Immunologic    Explanation:     Vitals:  /85   Pulse 95   Temp 98.2 °F (36.8 °C) (Temporal)   Resp 16   Ht 5' 8\" (1.727 m)   Wt 170 lb (77.1 kg)   SpO2 99%   BMI 25.85 kg/m²      Physical Examination:   Head: x  Atraumatic: x normocephalic  Skin and Mucosa        Moist x  Dry   Pale  x Normal   Neck:  Thyroid  Palpable   x  Not palpable   venus distention   adenopathy   Chest: x Clear   Rhonchi     Wheezing   CV:  xS1   xS2    xNo murmer   Abdomen:  x  Soft    Tender    Viceromegaly   Extremities:  x No Edema     Edema Portable    Result Date: 2020  Patient MRN:  [id number] : [id number] Age: 54 years Gender: Male Order Date:  2020 12:45 AM EXAM: XR CHEST PORTABLE INDICATION:  routine routine COMPARISON: 2019 FINDINGS:  Heart size is normal. There are no infiltrates or effusions. Normal chest         TREATMENT PLAN:    Risk Management: Based on the diagnosis and assessment biopsychosocial treatment model was presented to the patient and was given the opportunity to ask any question. The patient was agreeable to the plan and all the patient's questions were answered to the patient's satisfaction. I discussed with the patient the risk, benefit, alternative and common side effects for the proposed medication treatment. The patient is consenting to this treatment. Collateral Information:  Will obtain collateral information from the family or friends. Will obtain medical records as appropriate from out patient providers  Will consult the hospitalist for a physical exam to rule out any co-morbid physical condition. Home medication Reconciled   Will consult internal medicine for treatment of patient's HIV and diabetes as patient has been noncompliant with medications    New Medications started during this admission :    Patient seen and plan discussed with Dr. Martita Youssef  We will start patient back on Celexa 10 mg daily as reported this is worked well for him in the past  We will continue alcohol withdrawal protocol Depakote 250 mg twice daily for 5 days   Librium 25 mg every 4 hours scheduled and titrate as clinically indicated  Multivitamin, thiamine, folic acid    Prn Haldol 5mg and Vistaril 50mg q6hr for extreme agitation.   Trazodone as ordered for insomnia  Vistaril as ordered for anxiety      Psychotherapy:   Encourage participation in milieu and group therapy  Individual therapy as needed        Behavioral Services  Medicare Certification      Admission Day 1  I certify that this patient's inpatient psychiatric hospital admission is medically necessary for:     (1) treatment which could reasonably be expected to improve this patient's condition, or     (2) diagnostic study or its equivalent.        Electronically signed by Sarah Ahumada, APRN - CNP on 0/7/3714 at 10:35 AM

## 2020-04-02 NOTE — CONSULTS
Department of Internal Medicine  Infectious Diseases   Consult Note      Reason for Consult: HIV infection     Requesting Physician:Emelyn Higuera       HISTORY OF PRESENT ILLNESS:              Pt is known to me . This is a 54 yrs old male with HIV infection (  CD4 count 869 and HIV viral load <20 as of July 2019 - on Descovy and Evotaz ) admitted to the hospital with suicidal plan with drug over dose . Urine drug screeen +Ve for cocaine . He denies fever, chills, chest pain, headache   Reports that he's been adherent to the HIV meds.     Past Medical History:      Past Medical History:   Diagnosis Date    Diabetes mellitus ([facility] [address]     HIV (human immunodeficiency virus infection) ([facility] [address]     Hypertension     Neuropathy          Past Surgical History:      I & D of the tonsillar abscess     Current Medications:      Current Facility-Administered Medications   Medication Dose Route Frequency Provider Last Rate Last Dose    sodium chloride flush 0.9 % injection 10 mL  10 mL Intravenous 2 times per day Marcella Valencia MD        sodium chloride flush 0.9 % injection 10 mL  10 mL Intravenous PRN Marcella Valencia MD        hydrOXYzine (VISTARIL) capsule 50 mg  50 mg Oral TID PRN Marcella Valencia MD        haloperidol lactate (HALDOL) injection 5 mg  5 mg Intramuscular Q6H PRN Marcella Valencia MD        Or    haloperidol (HALDOL) tablet 5 mg  5 mg Oral Q6H PRN Marcella Valencia MD        magnesium hydroxide (MILK OF MAGNESIA) 400 MG/5ML suspension 30 mL  30 mL Oral Daily PRN Marcella Valencia MD        aluminum & magnesium hydroxide-simethicone (MAALOX) 200-200-20 MG/5ML suspension 30 mL  30 mL Oral PRN Marcella Valencia MD        chlordiazePOXIDE (LIBRIUM) capsule 25 mg  25 mg Oral P2Q NICHOLAS Paul - CNP   25 mg at 04/02/20 1628    divalproex (DEPAKOTE) DR tablet 250 mg  250 mg Oral 2 times per day NICHOLAS Wiley - CNP   304 mg at 04/02/20 1000    multivitamin 1 tablet  1 tablet Oral Daily NICHOLAS Wiley - CNP   1 tablet at 20/53/37 8046    folic acid (FOLVITE) tablet 1 mg  1 mg Oral Daily Nancy Olive, APRN - CNP   1 mg at 04/02/20 1000    vitamin B-1 (THIAMINE) tablet 100 mg  100 mg Oral Daily Nancy Olive, APRN - CNP   931 mg at 04/02/20 1000    citalopram (CELEXA) tablet 10 mg  10 mg Oral Daily Nancy Olive, APRN - CNP   10 mg at 04/02/20 1000    melatonin tablet 3 mg  3 mg Oral Nightly PRN Nancy Olive, APRN - CNP           Allergies:  Bee venom; Ibuprofen; and Nutritional supplements    Social History:      Social History     Socioeconomic History    Marital status:      Spouse name: Not on file    Number of children: 0    Years of education: 8    Highest education level: Not on file   Occupational History     Employer: UNEMPLOYED   Social Needs    Financial resource strain: Not on file    Food insecurity     Worry: Not on file     Inability: Not on file   [employer] needs     Medical: Not on file     Non-medical: Not on file   Tobacco Use    Smoking status: Former Smoker     Packs/day: 2.00     Years: 20.00     Pack years: 40.00     Types: Cigarettes    Smokeless tobacco: Never Used   Substance and Sexual Activity    Alcohol use:  Yes     Alcohol/week: 0.0 standard drinks     Comment: states that he drinks a lot and did so today     Drug use: Yes     Types: Marijuana, Cocaine     Comment: ysterday 4/1/2020    Sexual activity: Never   Lifestyle    Physical activity     Days per week: Not on file     Minutes per session: Not on file    Stress: Not on file   Relationships    Social connections     Talks on phone: Not on file     Gets together: Not on file     Attends [employer] service: Not on file     Active member of club or organization: Not on file     Attends meetings of clubs or organizations: Not on file     Relationship status: Not on file    Intimate partner violence     Fear of current or ex partner: Not on file     Emotionally abused: Not on file     Physically 04/02/2020    RDW 14.3 04/02/2020    SEGSPCT 46 01/21/2014    LYMPHOPCT 30.4 04/02/2020    MONOPCT 8.8 04/02/2020    BASOPCT 0.8 04/02/2020    MONOSABS 0.77 04/02/2020    LYMPHSABS 2.67 04/02/2020    EOSABS 0.07 04/02/2020    BASOSABS 0.07 04/02/2020       CMP     Lab Results   Component Value Date     04/02/2020    K 4.6 04/02/2020    K 4.4 01/26/2020     04/02/2020    CO2 21 04/02/2020    BUN 17 04/02/2020    CREATININE 1.0 04/02/2020    GFRAA >60 04/02/2020    LABGLOM >60 04/02/2020    GLUCOSE 240 04/02/2020    GLUCOSE 112 11/25/2010    PROT 6.9 04/02/2020    LABALBU 4.1 04/02/2020    LABALBU 4.3 11/22/2010    CALCIUM 9.2 04/02/2020    BILITOT 0.8 04/02/2020    ALKPHOS 73 04/02/2020    AST 30 04/02/2020    ALT 40 04/02/2020         Hepatic Function Panel:    Lab Results   Component Value Date    ALKPHOS 73 04/02/2020    ALT 40 04/02/2020    AST 30 04/02/2020    PROT 6.9 04/02/2020    BILITOT 0.8 04/02/2020    BILIDIR 0.3 12/19/2014    IBILI 1.0 12/19/2014    LABALBU 4.1 04/02/2020    LABALBU 4.3 11/22/2010       PT/INR:  No results found for: PROTIME, INR    TSH:    Lab Results   Component Value Date    TSH 0.867 11/05/2014       U/A:    Lab Results   Component Value Date    COLORU Yellow 04/02/2020    PHUR 6.0 04/02/2020    WBCUA NONE 04/02/2020    RBCUA 0-1 04/02/2020    BACTERIA RARE 04/02/2020    CLARITYU Clear 04/02/2020    SPECGRAV 1.020 04/02/2020    LEUKOCYTESUR Negative 04/02/2020    UROBILINOGEN 0.2 04/02/2020    BILIRUBINUR Negative 04/02/2020    BLOODU SMALL 04/02/2020    GLUCOSEU >=1000 04/02/2020       ABG:  No results found for: [place], BEART, B5RYGFFC, PHART, THGBART, FKQ6PJK, PO2ART, YAM7UDF    Radiology :    Chest X ray -no infiltrates       IMPRESSION:     1. HIV infection ( controlled as of July 2019 )  2. Substance abuse         RECOMMENDATIONS:      1. Teresa Gorman / Vandana Gregory ( reyataz and norvir in the hospital )   2. Albuterol inhaler for wheezing   3.  HIV viral load, CD 4 count Thank you Dr Katarina Garcia for the consult

## 2020-04-03 PROCEDURE — 6370000000 HC RX 637 (ALT 250 FOR IP): Performed by: PSYCHIATRY & NEUROLOGY

## 2020-04-03 PROCEDURE — 6370000000 HC RX 637 (ALT 250 FOR IP): Performed by: NURSE PRACTITIONER

## 2020-04-03 PROCEDURE — 1240000000 HC EMOTIONAL WELLNESS R&B

## 2020-04-03 PROCEDURE — 6370000000 HC RX 637 (ALT 250 FOR IP): Performed by: INTERNAL MEDICINE

## 2020-04-03 PROCEDURE — 99233 SBSQ HOSP IP/OBS HIGH 50: CPT | Performed by: NURSE PRACTITIONER

## 2020-04-03 RX ORDER — CHLORDIAZEPOXIDE HYDROCHLORIDE 25 MG/1
25 CAPSULE, GELATIN COATED ORAL EVERY 6 HOURS SCHEDULED
Status: DISCONTINUED | OUTPATIENT
Start: 2020-04-03 | End: 2020-04-05

## 2020-04-03 RX ADMIN — FOLIC ACID 1 MG: 1 TABLET ORAL at 08:40

## 2020-04-03 RX ADMIN — HYDROXYZINE PAMOATE 50 MG: 50 CAPSULE ORAL at 21:47

## 2020-04-03 RX ADMIN — CHLORDIAZEPOXIDE HYDROCHLORIDE 25 MG: 25 CAPSULE ORAL at 13:21

## 2020-04-03 RX ADMIN — DIVALPROEX SODIUM 250 MG: 250 TABLET, DELAYED RELEASE ORAL at 08:38

## 2020-04-03 RX ADMIN — MULTIVITAMIN TABLET 1 TABLET: TABLET at 08:38

## 2020-04-03 RX ADMIN — MELATONIN 3 MG ORAL TABLET 3 MG: 3 TABLET ORAL at 21:48

## 2020-04-03 RX ADMIN — ATAZANAVIR 300 MG: 300 CAPSULE, GELATIN COATED ORAL at 08:38

## 2020-04-03 RX ADMIN — CHLORDIAZEPOXIDE HYDROCHLORIDE 25 MG: 25 CAPSULE ORAL at 17:58

## 2020-04-03 RX ADMIN — CHLORDIAZEPOXIDE HYDROCHLORIDE 25 MG: 25 CAPSULE ORAL at 05:39

## 2020-04-03 RX ADMIN — EMTRICITABINE AND TENOFOVIR ALAFENAMIDE 1 TABLET: 200; 25 TABLET ORAL at 10:20

## 2020-04-03 RX ADMIN — DIVALPROEX SODIUM 250 MG: 250 TABLET, DELAYED RELEASE ORAL at 21:48

## 2020-04-03 RX ADMIN — RITONAVIR 100 MG: 100 TABLET ORAL at 08:38

## 2020-04-03 RX ADMIN — CITALOPRAM 10 MG: 20 TABLET, FILM COATED ORAL at 08:38

## 2020-04-03 RX ADMIN — Medication 100 MG: at 08:38

## 2020-04-03 ASSESSMENT — PAIN SCALES - GENERAL: PAINLEVEL_OUTOF10: 0

## 2020-04-03 NOTE — PROGRESS NOTES
Patient denies SI,HI and hallucinations. \" not at the moment\" Patient was out on the unit social with peers laughing and joking watching television. Patient voiced no complaints or concerns at this time.  Will continue to monitor and observe

## 2020-04-03 NOTE — PLAN OF CARE
Patient is isolative but becomes more social as the day progresses. Patient denies SI, HI, and AVH. Patient also presents evasive and with poverty of content. Insight and motivation poor. Impaired concentration at times. No behavioral issues. Will monitor closely.

## 2020-04-04 LAB
ABSOLUTE CD 3: 1773 CELLS/UL (ref 570–2400)
ABSOLUTE CD 4 HELPER: 812 CELLS/UL (ref 430–1800)
ABSOLUTE CD 8 (SUPP): 970 CELLS/UL (ref 210–1200)
ANION GAP SERPL CALCULATED.3IONS-SCNC: 12 MMOL/L (ref 7–16)
BUN BLDV-MCNC: 21 MG/DL (ref 6–20)
CALCIUM SERPL-MCNC: 10.6 MG/DL (ref 8.6–10.2)
CD4/CD8 RATIO: 0.84 RATIO (ref 0.8–3.9)
CHLORIDE BLD-SCNC: 92 MMOL/L (ref 98–107)
CO2: 23 MMOL/L (ref 22–29)
CREAT SERPL-MCNC: 1.2 MG/DL (ref 0.7–1.2)
GFR AFRICAN AMERICAN: >60
GFR NON-AFRICAN AMERICAN: >60 ML/MIN/1.73
GLUCOSE BLD-MCNC: 746 MG/DL (ref 74–99)
GLUCOSE BLD-MCNC: 856 MG/DL (ref 74–99)
LYMPH SUBSET INFORMATION: NORMAL
METER GLUCOSE: 490 MG/DL (ref 74–99)
METER GLUCOSE: >500 MG/DL (ref 74–99)
PH VENOUS: 7.3 (ref 7.35–7.45)
POTASSIUM SERPL-SCNC: 4.7 MMOL/L (ref 3.5–5)
SODIUM BLD-SCNC: 127 MMOL/L (ref 132–146)

## 2020-04-04 PROCEDURE — 80048 BASIC METABOLIC PNL TOTAL CA: CPT

## 2020-04-04 PROCEDURE — 6370000000 HC RX 637 (ALT 250 FOR IP): Performed by: NURSE PRACTITIONER

## 2020-04-04 PROCEDURE — 6370000000 HC RX 637 (ALT 250 FOR IP): Performed by: INTERNAL MEDICINE

## 2020-04-04 PROCEDURE — 1240000000 HC EMOTIONAL WELLNESS R&B

## 2020-04-04 PROCEDURE — 82947 ASSAY GLUCOSE BLOOD QUANT: CPT

## 2020-04-04 PROCEDURE — 6370000000 HC RX 637 (ALT 250 FOR IP): Performed by: PSYCHIATRY & NEUROLOGY

## 2020-04-04 PROCEDURE — 36415 COLL VENOUS BLD VENIPUNCTURE: CPT

## 2020-04-04 PROCEDURE — 82962 GLUCOSE BLOOD TEST: CPT

## 2020-04-04 PROCEDURE — 99232 SBSQ HOSP IP/OBS MODERATE 35: CPT | Performed by: NURSE PRACTITIONER

## 2020-04-04 PROCEDURE — 82800 BLOOD PH: CPT

## 2020-04-04 RX ORDER — ALBUTEROL SULFATE 90 UG/1
2 AEROSOL, METERED RESPIRATORY (INHALATION) EVERY 6 HOURS PRN
Status: DISCONTINUED | OUTPATIENT
Start: 2020-04-04 | End: 2020-04-08 | Stop reason: HOSPADM

## 2020-04-04 RX ORDER — MONTELUKAST SODIUM 10 MG/1
10 TABLET ORAL NIGHTLY
Status: DISCONTINUED | OUTPATIENT
Start: 2020-04-04 | End: 2020-04-08 | Stop reason: HOSPADM

## 2020-04-04 RX ORDER — LISINOPRIL 20 MG/1
40 TABLET ORAL DAILY
Status: DISCONTINUED | OUTPATIENT
Start: 2020-04-04 | End: 2020-04-08 | Stop reason: HOSPADM

## 2020-04-04 RX ORDER — INSULIN GLARGINE 100 [IU]/ML
25 INJECTION, SOLUTION SUBCUTANEOUS NIGHTLY
Status: DISCONTINUED | OUTPATIENT
Start: 2020-04-04 | End: 2020-04-05

## 2020-04-04 RX ADMIN — CHLORDIAZEPOXIDE HYDROCHLORIDE 25 MG: 25 CAPSULE ORAL at 11:33

## 2020-04-04 RX ADMIN — CHLORDIAZEPOXIDE HYDROCHLORIDE 25 MG: 25 CAPSULE ORAL at 07:04

## 2020-04-04 RX ADMIN — CHLORDIAZEPOXIDE HYDROCHLORIDE 25 MG: 25 CAPSULE ORAL at 18:30

## 2020-04-04 RX ADMIN — ATAZANAVIR 300 MG: 300 CAPSULE, GELATIN COATED ORAL at 09:05

## 2020-04-04 RX ADMIN — CITALOPRAM 10 MG: 20 TABLET, FILM COATED ORAL at 09:05

## 2020-04-04 RX ADMIN — MULTIVITAMIN TABLET 1 TABLET: TABLET at 09:05

## 2020-04-04 RX ADMIN — EMTRICITABINE AND TENOFOVIR ALAFENAMIDE 1 TABLET: 200; 25 TABLET ORAL at 09:06

## 2020-04-04 RX ADMIN — DIVALPROEX SODIUM 250 MG: 250 TABLET, DELAYED RELEASE ORAL at 20:51

## 2020-04-04 RX ADMIN — INSULIN LISPRO 10 UNITS: 100 INJECTION, SOLUTION INTRAVENOUS; SUBCUTANEOUS at 18:30

## 2020-04-04 RX ADMIN — MONTELUKAST SODIUM 10 MG: 10 TABLET, FILM COATED ORAL at 20:51

## 2020-04-04 RX ADMIN — Medication 100 MG: at 09:06

## 2020-04-04 RX ADMIN — HYDROXYZINE PAMOATE 50 MG: 50 CAPSULE ORAL at 20:51

## 2020-04-04 RX ADMIN — MELATONIN 3 MG ORAL TABLET 3 MG: 3 TABLET ORAL at 20:51

## 2020-04-04 RX ADMIN — INSULIN GLARGINE 25 UNITS: 100 INJECTION, SOLUTION SUBCUTANEOUS at 20:50

## 2020-04-04 RX ADMIN — DIVALPROEX SODIUM 250 MG: 250 TABLET, DELAYED RELEASE ORAL at 09:05

## 2020-04-04 RX ADMIN — RITONAVIR 100 MG: 100 TABLET ORAL at 09:05

## 2020-04-04 RX ADMIN — FOLIC ACID 1 MG: 1 TABLET ORAL at 09:05

## 2020-04-04 RX ADMIN — CHLORDIAZEPOXIDE HYDROCHLORIDE 25 MG: 25 CAPSULE ORAL at 00:31

## 2020-04-04 RX ADMIN — LISINOPRIL 40 MG: 20 TABLET ORAL at 14:51

## 2020-04-04 ASSESSMENT — PAIN SCALES - GENERAL
PAINLEVEL_OUTOF10: 0

## 2020-04-04 NOTE — PROGRESS NOTES
controlled  [x] Improving  [] Worsening  [] No change      Diagnosis:   Principal Problem:    MDD (major depressive disorder), recurrent episode, moderate (HCC)  Active Problems:    Cocaine abuse ([facility] [address]    Alcohol dependence ([facility] [address]  Resolved Problems:    * No resolved hospital problems. *      LABS:    Recent Labs     04/02/20  0051   WBC 8.8   HGB 14.5        Recent Labs     04/02/20  0051 04/02/20  0100     --    K 4.6  --      --    CO2 21*  --    BUN 17  --    CREATININE 1.0  --    GLUCOSE 261* 240     Recent Labs     04/02/20 0051   BILITOT 0.8   ALKPHOS 73   AST 30   ALT 40     Lab Results   Component Value Date    LABAMPH NOT DETECTED 04/02/2020    BARBSCNU NOT DETECTED 04/02/2020    LABBENZ NOT DETECTED 04/02/2020    LABMETH NOT DETECTED 04/02/2020    OPIATESCREENURINE NOT DETECTED 04/02/2020    PHENCYCLIDINESCREENURINE NOT DETECTED 04/02/2020    ETOH <10 04/02/2020     Lab Results   Component Value Date    TSH 0.867 11/05/2014     No results found for: LITHIUM  No results found for: VALPROATE, CBMZ        Treatment Plan:  Reviewed current Medications with the patient. Risks, benefits, side effects, drug-to-drug interactions and alternatives to treatment were discussed. Collateral information:   CD evaluation  Encourage patient to attend group and other milieu activities.   Discharge planning discussed with the patient and treatment team.    PSYCHOTHERAPY/COUNSELING:  [x] Therapeutic interview  [x] Supportive  [] CBT  [] Ongoing  [] Other    [x] Patient continues to need, on a daily basis, active treatment furnished directly by or requiring the supervision of inpatient psychiatric personnel      Anticipated Length of stay:5-7 days            Electronically signed by NICHOLAS Hermosillo CNP on 4/4/2020 at 12:47 PM

## 2020-04-04 NOTE — GROUP NOTE
Group Therapy Note    Date: 4/3/2020    Group Start Time: 2000  Group End Time: 2040  Group Topic: Healthy Living/Wellness    [facility]  [address], RN        Group Therapy Note    Attendees: 8/24

## 2020-04-04 NOTE — CONSULTS
tablet Take 0.5 tablets by mouth daily. 2/3/15  Yes Daniel Juarez MD   metFORMIN (GLUCOPHAGE) 500 MG tablet Take 1 tablet by mouth daily (with breakfast). Patient taking differently: Take 500 mg by mouth 2 times daily (with meals)  2/3/15  Yes Daniel Juarez MD   Respiratory Therapy Supplies (NEBULIZER/TUBING/MOUTHPIECE) KIT 1 kit by Does not apply route daily as needed (hx of asthma) 5/30/19   Celio Barry, DO   glucose blood VI test strips (ACCU-CHEK ADVANTAGE TEST) strip As needed. 7/1/15   Jeanette Michel, DO   Insulin Syringe-Needle U-100 (ULTRA-COMFORT INSULIN SYRINGE) 31G X 5/16\" 0.5 ML MISC Check fasting bs and 3 more two hours post prandial 5/12/15   Daniel Juarez MD   REYATAZ 300 MG capsule  11/18/14   Historical Provider, MD   TRUVADA 200-300 MG per tablet  11/18/14   Historical Provider, MD   LITE TOUCH INS SYR .5CC/30G 30G X 5/16\" 0.5 ML MISC  11/5/14   Historical Provider, MD   NORVIR 100 MG capsule  11/18/14   Historical Provider, MD   AccsonyChek Multiclix Lancets MISC Check fasting bs, and 3 times 2 hours post meal 6/2/14   Lexi Rossi MD   Alcohol Swabs (B-D SINGLE USE SWABS REGULAR) PADS Dispense 100 with 3 refills 6/2/14   Lexi Rossi MD   Blood Glucose Monitoring Suppl (ACCU-CHEK ADVANTAGE DIABETES) KIT Dispense one kit 6/2/14   Lexi Rossi MD   montelukast (SINGULAIR) 10 MG tablet Take 1 tablet by mouth nightly. 6/2/14   Lexi Rossi MD   GNP Lancets Super Thin MISC  10/14/13   Lexi Rossi MD       Allergies:  Bee venom; Ibuprofen; and Nutritional supplements    Social History:   TOBACCO:   reports that he has quit smoking. His smoking use included cigarettes. He has a 40.00 pack-year smoking history. He has never used smokeless tobacco.  ETOH:   reports current alcohol use. OCCUPATION:      Family History:   History reviewed. No pertinent family history.     REVIEW OF SYSTEMS:  · Constitutional:  No fever, no chill or change in weight; good appetite  · HEENT: No blurred vision, no ear problems, no sore throat, no running nose. · Respiratory: Dry cough, no sputum, no pleuritic chest pain, no shortness of breath  · Cardiology: No angina, no dyspnea on exertion, no paroxysmal nocturnal dyspnea, no orthopnea, no palpitation, no leg swelling. · Gastroenterology: No dysphagia, no reflux; no abdominal pain, no nausea or vomiting; no constipation or diarrhea. No blood in stool. · Genitourinary: No dysuria, no frequency, hesitancy; no hematuria  · Musculoskeletal: no joint pain, no myalgia, no change in range of movement  · Neurology: no focal weakness in extremities, no slurred speech, no double vision, no tingling or numbness sensation. · Endocrinology: no temperature intolerance, no polyphagia, polydipsia or polyuria  · Hematology: no increased bleeding, no bruising, no lymphadenopathy  · Skin: no skin change noticed by patient  · Psychology: no depressed mood, no suicidal ideation    Physical Exam   · Vitals: /80   Pulse 93   Temp 97.4 °F (36.3 °C)   Resp 17   Ht 5' 8\" (1.727 m)   Wt 170 lb (77.1 kg)   SpO2 99%   BMI 25.85 kg/m²   · General Appearance: Alert, cooperative, no acute distress. · HEENT: Normocephalic, atraumatic. PERRLA, conjunctiva/corneas clear, EOM's intact, no pallor or icterus. · Neck: Supple, symmetrical, trachea midline, no cervical LAD. No carotid bruit or JVD  · Chest wall: No tenderness or deformity, full & symmetric excursion  · Lung: Clear to auscultation bilaterally,  respirations unlabored. No rales/wheezing/rubs  · Heart: Regular rate and rhythm, S1 and S2 normal, no murmur, rub or gallop. DP pulses 2/4,    · Abdomen: Soft, non-tender, bowel sounds active all four quadrants, no masses, no organomegaly, No guarding, rebound or rigidity. · Genital and rectal exam: deferred  · Extremities:  Extremities normal, atraumatic, no cyanosis or edema.  Pulses equal bilaterally  · Skin: Skin color, texture, turgor normal, no rashes or

## 2020-04-04 NOTE — PLAN OF CARE
Patient was isolative to his room, denies suicidal thought or thought of harming other,not seeing shadow or hearing voices. Verbalizes that his depression is 5 out of 10 do to his wife passing 4 years ago. My appetite is great and sleep is ok. I hope on discharge to get into inpatient rehab for drug and alcohol rehab [facility]). Patient questioned about his insulin , says he takes insulin and metformin. Patient compliant medications ,but did not attend groups. Safety rounds continue.

## 2020-04-05 LAB
ANION GAP SERPL CALCULATED.3IONS-SCNC: 17 MMOL/L (ref 7–16)
BUN BLDV-MCNC: 23 MG/DL (ref 6–20)
CALCIUM SERPL-MCNC: 9.3 MG/DL (ref 8.6–10.2)
CHLORIDE BLD-SCNC: 102 MMOL/L (ref 98–107)
CO2: 20 MMOL/L (ref 22–29)
CREAT SERPL-MCNC: 1 MG/DL (ref 0.7–1.2)
GFR AFRICAN AMERICAN: >60
GFR NON-AFRICAN AMERICAN: >60 ML/MIN/1.73
GLUCOSE BLD-MCNC: 357 MG/DL (ref 74–99)
GLUCOSE BLD-MCNC: 833 MG/DL (ref 74–99)
HBA1C MFR BLD: 12.2 % (ref 4–5.6)
METER GLUCOSE: 140 MG/DL (ref 74–99)
METER GLUCOSE: 230 MG/DL (ref 74–99)
METER GLUCOSE: 268 MG/DL (ref 74–99)
METER GLUCOSE: 289 MG/DL (ref 74–99)
METER GLUCOSE: 357 MG/DL (ref 74–99)
METER GLUCOSE: 359 MG/DL (ref 74–99)
METER GLUCOSE: >500 MG/DL (ref 74–99)
POTASSIUM SERPL-SCNC: 4.3 MMOL/L (ref 3.5–5)
SODIUM BLD-SCNC: 139 MMOL/L (ref 132–146)

## 2020-04-05 PROCEDURE — 99232 SBSQ HOSP IP/OBS MODERATE 35: CPT | Performed by: NURSE PRACTITIONER

## 2020-04-05 PROCEDURE — 2580000003 HC RX 258: Performed by: PSYCHIATRY & NEUROLOGY

## 2020-04-05 PROCEDURE — 83036 HEMOGLOBIN GLYCOSYLATED A1C: CPT

## 2020-04-05 PROCEDURE — 99252 IP/OBS CONSLTJ NEW/EST SF 35: CPT | Performed by: INTERNAL MEDICINE

## 2020-04-05 PROCEDURE — 6370000000 HC RX 637 (ALT 250 FOR IP): Performed by: INTERNAL MEDICINE

## 2020-04-05 PROCEDURE — 6370000000 HC RX 637 (ALT 250 FOR IP): Performed by: NURSE PRACTITIONER

## 2020-04-05 PROCEDURE — 82962 GLUCOSE BLOOD TEST: CPT

## 2020-04-05 PROCEDURE — 82947 ASSAY GLUCOSE BLOOD QUANT: CPT

## 2020-04-05 PROCEDURE — 36415 COLL VENOUS BLD VENIPUNCTURE: CPT

## 2020-04-05 PROCEDURE — 2580000003 HC RX 258: Performed by: INTERNAL MEDICINE

## 2020-04-05 PROCEDURE — 80048 BASIC METABOLIC PNL TOTAL CA: CPT

## 2020-04-05 PROCEDURE — 1240000000 HC EMOTIONAL WELLNESS R&B

## 2020-04-05 RX ORDER — 0.9 % SODIUM CHLORIDE 0.9 %
500 INTRAVENOUS SOLUTION INTRAVENOUS ONCE
Status: COMPLETED | OUTPATIENT
Start: 2020-04-05 | End: 2020-04-05

## 2020-04-05 RX ORDER — INSULIN GLARGINE 100 [IU]/ML
10 INJECTION, SOLUTION SUBCUTANEOUS NIGHTLY
Status: DISCONTINUED | OUTPATIENT
Start: 2020-04-05 | End: 2020-04-05

## 2020-04-05 RX ORDER — NICOTINE POLACRILEX 4 MG
15 LOZENGE BUCCAL PRN
Status: DISCONTINUED | OUTPATIENT
Start: 2020-04-05 | End: 2020-04-08 | Stop reason: HOSPADM

## 2020-04-05 RX ORDER — DEXTROSE MONOHYDRATE 50 MG/ML
100 INJECTION, SOLUTION INTRAVENOUS PRN
Status: DISCONTINUED | OUTPATIENT
Start: 2020-04-05 | End: 2020-04-08 | Stop reason: HOSPADM

## 2020-04-05 RX ORDER — INSULIN GLARGINE 100 [IU]/ML
25 INJECTION, SOLUTION SUBCUTANEOUS NIGHTLY
Status: DISCONTINUED | OUTPATIENT
Start: 2020-04-05 | End: 2020-04-06

## 2020-04-05 RX ORDER — CHLORDIAZEPOXIDE HYDROCHLORIDE 25 MG/1
25 CAPSULE, GELATIN COATED ORAL EVERY 8 HOURS SCHEDULED
Status: DISCONTINUED | OUTPATIENT
Start: 2020-04-05 | End: 2020-04-07

## 2020-04-05 RX ORDER — DEXTROSE MONOHYDRATE 25 G/50ML
12.5 INJECTION, SOLUTION INTRAVENOUS PRN
Status: DISCONTINUED | OUTPATIENT
Start: 2020-04-05 | End: 2020-04-08 | Stop reason: HOSPADM

## 2020-04-05 RX ORDER — INSULIN GLARGINE 100 [IU]/ML
25 INJECTION, SOLUTION SUBCUTANEOUS ONCE
Status: COMPLETED | OUTPATIENT
Start: 2020-04-05 | End: 2020-04-05

## 2020-04-05 RX ORDER — CITALOPRAM 20 MG/1
20 TABLET ORAL DAILY
Status: DISCONTINUED | OUTPATIENT
Start: 2020-04-06 | End: 2020-04-08 | Stop reason: HOSPADM

## 2020-04-05 RX ADMIN — RITONAVIR 100 MG: 100 TABLET ORAL at 08:37

## 2020-04-05 RX ADMIN — CHLORDIAZEPOXIDE HYDROCHLORIDE 25 MG: 25 CAPSULE ORAL at 23:28

## 2020-04-05 RX ADMIN — MONTELUKAST SODIUM 10 MG: 10 TABLET, FILM COATED ORAL at 21:51

## 2020-04-05 RX ADMIN — Medication 100 MG: at 08:38

## 2020-04-05 RX ADMIN — FOLIC ACID 1 MG: 1 TABLET ORAL at 08:37

## 2020-04-05 RX ADMIN — INSULIN LISPRO 6 UNITS: 100 INJECTION, SOLUTION INTRAVENOUS; SUBCUTANEOUS at 16:24

## 2020-04-05 RX ADMIN — CHLORDIAZEPOXIDE HYDROCHLORIDE 25 MG: 25 CAPSULE ORAL at 11:35

## 2020-04-05 RX ADMIN — INSULIN LISPRO 5 UNITS: 100 INJECTION, SOLUTION INTRAVENOUS; SUBCUTANEOUS at 21:49

## 2020-04-05 RX ADMIN — INSULIN LISPRO 6 UNITS: 100 INJECTION, SOLUTION INTRAVENOUS; SUBCUTANEOUS at 11:58

## 2020-04-05 RX ADMIN — SODIUM CHLORIDE 500 ML: 9 INJECTION, SOLUTION INTRAVENOUS at 02:02

## 2020-04-05 RX ADMIN — ATAZANAVIR 300 MG: 300 CAPSULE, GELATIN COATED ORAL at 08:37

## 2020-04-05 RX ADMIN — INSULIN GLARGINE 25 UNITS: 100 INJECTION, SOLUTION SUBCUTANEOUS at 02:18

## 2020-04-05 RX ADMIN — METFORMIN HYDROCHLORIDE 500 MG: 500 TABLET ORAL at 08:37

## 2020-04-05 RX ADMIN — CITALOPRAM 10 MG: 20 TABLET, FILM COATED ORAL at 08:38

## 2020-04-05 RX ADMIN — MULTIVITAMIN TABLET 1 TABLET: TABLET at 08:38

## 2020-04-05 RX ADMIN — SODIUM CHLORIDE 500 ML: 9 INJECTION, SOLUTION INTRAVENOUS at 13:18

## 2020-04-05 RX ADMIN — INSULIN LISPRO 6 UNITS: 100 INJECTION, SOLUTION INTRAVENOUS; SUBCUTANEOUS at 02:05

## 2020-04-05 RX ADMIN — INSULIN GLARGINE 25 UNITS: 100 INJECTION, SOLUTION SUBCUTANEOUS at 21:47

## 2020-04-05 RX ADMIN — INSULIN LISPRO 1 UNITS: 100 INJECTION, SOLUTION INTRAVENOUS; SUBCUTANEOUS at 09:53

## 2020-04-05 RX ADMIN — METFORMIN HYDROCHLORIDE 1000 MG: 1000 TABLET ORAL at 16:25

## 2020-04-05 RX ADMIN — EMTRICITABINE AND TENOFOVIR ALAFENAMIDE 1 TABLET: 200; 25 TABLET ORAL at 09:54

## 2020-04-05 RX ADMIN — Medication 10 ML: at 08:37

## 2020-04-05 ASSESSMENT — PAIN DESCRIPTION - DESCRIPTORS: DESCRIPTORS: ACHING

## 2020-04-05 ASSESSMENT — PAIN DESCRIPTION - FREQUENCY: FREQUENCY: CONTINUOUS

## 2020-04-05 ASSESSMENT — PAIN DESCRIPTION - PAIN TYPE: TYPE: CHRONIC PAIN

## 2020-04-05 ASSESSMENT — PAIN DESCRIPTION - ORIENTATION: ORIENTATION: RIGHT;LEFT

## 2020-04-05 ASSESSMENT — PAIN SCALES - GENERAL
PAINLEVEL_OUTOF10: 0

## 2020-04-05 ASSESSMENT — PAIN DESCRIPTION - LOCATION: LOCATION: BACK;NECK

## 2020-04-05 NOTE — PLAN OF CARE
Patient was isolative to his room,lying on stomach awake on bed. Denies suicidal thoughts or thoughts of harming others, denies hearing voices or seeing shadows. States that he does not have any anxiety or depression. Patient affect is flat and sad. States that sleep is fine and appetite is ok. Compliant with medications and does attend groups. Safety rounds continue.

## 2020-04-05 NOTE — PLAN OF CARE
Problem: Depressive Behavior With or Without Suicide Precautions:  Goal: Able to verbalize acceptance of life and situations over which he or she has no control  Description: Able to verbalize acceptance of life and situations over which he or she has no control  4/5/2020 1123 by Tay Ross RN  Outcome: Ongoing  4/5/2020 0414 by Tyron Penaloza RN  Outcome: Ongoing  4/5/2020 0407 by Tyron Penaloza RN  Outcome: Ongoing  Goal: Able to verbalize and/or display a decrease in depressive symptoms  Description: Able to verbalize and/or display a decrease in depressive symptoms  4/5/2020 1123 by Tay Ross RN  Outcome: Ongoing  4/5/2020 0414 by Tyron Penaloza RN  Outcome: Ongoing  4/5/2020 0407 by Tyron Penaloza RN  Outcome: Ongoing  Goal: Ability to disclose and discuss suicidal ideas will improve  Description: Ability to disclose and discuss suicidal ideas will improve  4/5/2020 1123 by Tay Ross RN  Outcome: Ongoing  4/5/2020 0414 by Tyron Penaloza RN  Outcome: Ongoing  4/5/2020 0407 by Tyron Penaloza RN  Outcome: Met This Shift  Goal: Able to verbalize support systems  Description: Able to verbalize support systems  4/5/2020 1123 by Tay Ross RN  Outcome: Ongoing  4/5/2020 0414 by Tyron Penaloza RN  Outcome: Ongoing  4/5/2020 0407 by Tyron Penaloza RN  Outcome: Ongoing     Problem: Pain:  Goal: Pain level will decrease  Description: Pain level will decrease  4/5/2020 1123 by aTy Ross RN  Outcome: Ongoing  4/5/2020 0414 by Tyron Penaloza RN  Outcome: Ongoing  4/5/2020 0407 by Tyron Penaloza RN  Outcome: Ongoing  Goal: Control of acute pain  Description: Control of acute pain  Outcome: Ongoing  Goal: Control of chronic pain  Description: Control of chronic pain  Outcome: Ongoing

## 2020-04-05 NOTE — PROGRESS NOTES
BEHAVIORAL HEALTH FOLLOW-UP NOTE     4/5/2020     Patient was seen and examined in person, Chart reviewed   Patient's case discussed with staff/team    Chief Complaint: \" I am feeling better I am just tired and I need to rest.\"  Interim History: Patient seen in his room he is isolative not attending groups keeping to himself. He states that he the meds are working well for him. He denies SI/HI intent or plan denies auditory visualizations. He states he feels tired and needs to rest.  He wants to go to inpatient rehab on discharge. Patient did not offer much of a conversation to me and just wanted to go back to bed. Appetite:   [x] Normal/Unchanged  [] Increased  [] Decreased      Sleep:       [x] Normal/Unchanged  [] Fair       [] Poor              Energy:    [x] Normal/Unchanged  [] Increased  [] Decreased        SI [] Present  [x] Absent    HI  []Present  [x] Absent     Aggression:  [] yes  [x] no    Patient is [x] able  [] unable to CONTRACT FOR SAFETY     PAST MEDICAL/PSYCHIATRIC HISTORY:   Past Medical History:   Diagnosis Date    [facility] [address]     [facility] virus infection) ([facility]     Hypertension     Neuropathy        FAMILY/SOCIAL HISTORY:  History reviewed. No pertinent family history. Social History     Socioeconomic History    Marital status:      Spouse name: Not on file    Number of children: 0    Years of education: 8    Highest education level: Not on file   Occupational History     Employer: UNEMPLOYED   Social Needs    Financial resource strain: Not on file    Food insecurity     Worry: Not on file     Inability: Not on file   [employer] needs     Medical: Not on file     Non-medical: Not on file   Tobacco Use    Smoking status: Former Smoker     Packs/day: 2.00     Years: 20.00     Pack years: 40.00     Types: Cigarettes    Smokeless tobacco: Never Used   Substance and Sexual Activity    Alcohol use:  Yes     Alcohol/week: 0.0 standard (LANTUS) injection vial 25 Units, 25 Units, Subcutaneous, Nightly, Natalee Bond MD    chlordiazePOXIDE (LIBRIUM) capsule 25 mg, 25 mg, Oral, 3 times per day, NICHOLAS Arndt CNP  [facility]  [START ON 4/6/2020] citalopram (CELEXA) tablet 20 mg, 20 mg, Oral, Daily, NICHOLAS Arndt - CNP    montelukast (SINGULAIR) tablet 10 mg, 10 mg, Oral, Nightly, Chace Pompa MD, 10 mg at 04/04/20 2051    albuterol sulfate  (90 Base) MCG/ACT inhaler 2 puff, 2 puff, Inhalation, Q6H PRN, Chace Pompa MD    lisinopril (PRINIVIL;ZESTRIL) tablet 40 mg, 40 mg, Oral, Daily, Chace Pompa MD, 40 mg at 04/04/20 1451    sodium chloride flush 0.9 % injection 10 mL, 10 mL, Intravenous, 2 times per day, Yusef Heredia MD, 10 mL at 04/05/20 0837    sodium chloride flush 0.9 % injection 10 mL, 10 mL, Intravenous, PRN, Yusef Heredia MD    hydrOXYzine (VISTARIL) capsule 50 mg, 50 mg, Oral, TID PRN, Yusef Heredia MD, 50 mg at 04/04/20 2051    haloperidol lactate (HALDOL) injection 5 mg, 5 mg, Intramuscular, Q6H PRN **OR** haloperidol (HALDOL) tablet 5 mg, 5 mg, Oral, Q6H PRN, Yusef Heredia MD    magnesium hydroxide (MILK OF MAGNESIA) 400 MG/5ML suspension 30 mL, 30 mL, Oral, Daily PRN, Yusef Heredia MD    aluminum & magnesium hydroxide-simethicone (MAALOX) 200-200-20 MG/5ML suspension 30 mL, 30 mL, Oral, PRN, Yusef Heredia MD    multivitamin 1 tablet, 1 tablet, Oral, Daily, NICHOLAS Aaron - CNP, 1 tablet at 82/05/88 7940    folic acid (FOLVITE) tablet 1 mg, 1 mg, Oral, Daily, NICHOLAS Aaron CNP, 1 mg at 04/05/20 0120    vitamin B-1 (THIAMINE) tablet 100 mg, 100 mg, Oral, Daily, NICHOLAS Aaron CNP, 317 mg at 04/05/20 0838    melatonin tablet 3 mg, 3 mg, Oral, Nightly PRN, NICHOLAS Aaron CNP, 3 mg at 04/04/20 2051    emtricitabine-tenofovir alafenamide (DESCOVY) 200-25 MG per tablet 1 tablet, 1 tablet, Oral, Daily, Lucy Todd MD, 1 tablet at 04/05/20 0954    atazanavir (REYATAZ) capsule

## 2020-04-05 NOTE — PROGRESS NOTES
DR. Virginia Pino notified via perfect serve for stat  new orders received   500cc Bolus,   25 units Lantus X1   6 units Humalog x1   check glucose at 430 ,  check glucose achs    low sliding scale insulin  change diet to diabetic   Recheck BS at 430am

## 2020-04-05 NOTE — PLAN OF CARE
Patient resting quiet to self at this time, respirations are even and unlabored, no signs or symptoms of distress or discomfort. PRN medications given this shift for low blood sugar. Staff will continue to conduct environmental rounds to ensure the safety of everyone on the unit. Staff will provide support and interventions as requested or required.

## 2020-04-06 LAB
ANION GAP SERPL CALCULATED.3IONS-SCNC: 13 MMOL/L (ref 7–16)
BUN BLDV-MCNC: 17 MG/DL (ref 6–20)
CALCIUM SERPL-MCNC: 9.9 MG/DL (ref 8.6–10.2)
CHLORIDE BLD-SCNC: 103 MMOL/L (ref 98–107)
CO2: 20 MMOL/L (ref 22–29)
CREAT SERPL-MCNC: 0.7 MG/DL (ref 0.7–1.2)
GFR AFRICAN AMERICAN: >60
GFR NON-AFRICAN AMERICAN: >60 ML/MIN/1.73
GLUCOSE BLD-MCNC: 255 MG/DL (ref 74–99)
METER GLUCOSE: 205 MG/DL (ref 74–99)
METER GLUCOSE: 248 MG/DL (ref 74–99)
METER GLUCOSE: 438 MG/DL (ref 74–99)
METER GLUCOSE: 98 MG/DL (ref 74–99)
POTASSIUM SERPL-SCNC: 3.8 MMOL/L (ref 3.5–5)
SODIUM BLD-SCNC: 136 MMOL/L (ref 132–146)

## 2020-04-06 PROCEDURE — 6370000000 HC RX 637 (ALT 250 FOR IP): Performed by: INTERNAL MEDICINE

## 2020-04-06 PROCEDURE — 2580000003 HC RX 258: Performed by: PSYCHIATRY & NEUROLOGY

## 2020-04-06 PROCEDURE — 82962 GLUCOSE BLOOD TEST: CPT

## 2020-04-06 PROCEDURE — 80048 BASIC METABOLIC PNL TOTAL CA: CPT

## 2020-04-06 PROCEDURE — 6370000000 HC RX 637 (ALT 250 FOR IP): Performed by: NURSE PRACTITIONER

## 2020-04-06 PROCEDURE — 99233 SBSQ HOSP IP/OBS HIGH 50: CPT | Performed by: NURSE PRACTITIONER

## 2020-04-06 PROCEDURE — 1240000000 HC EMOTIONAL WELLNESS R&B

## 2020-04-06 PROCEDURE — 6370000000 HC RX 637 (ALT 250 FOR IP): Performed by: PSYCHIATRY & NEUROLOGY

## 2020-04-06 PROCEDURE — 36415 COLL VENOUS BLD VENIPUNCTURE: CPT

## 2020-04-06 PROCEDURE — 99232 SBSQ HOSP IP/OBS MODERATE 35: CPT | Performed by: INTERNAL MEDICINE

## 2020-04-06 RX ORDER — BLOOD PRESSURE TEST KIT
1 KIT MISCELLANEOUS
Qty: 200 EACH | Refills: 5 | Status: SHIPPED | OUTPATIENT
Start: 2020-04-06 | End: 2020-04-07 | Stop reason: SDUPTHER

## 2020-04-06 RX ORDER — LISINOPRIL 5 MG/1
5 TABLET ORAL DAILY
Qty: 30 TABLET | Refills: 3 | Status: SHIPPED | OUTPATIENT
Start: 2020-04-06 | End: 2020-04-08 | Stop reason: SDUPTHER

## 2020-04-06 RX ORDER — BLOOD-GLUCOSE METER
KIT MISCELLANEOUS
Qty: 1 KIT | Refills: 0 | Status: ON HOLD | OUTPATIENT
Start: 2020-04-06 | End: 2021-10-09 | Stop reason: HOSPADM

## 2020-04-06 RX ORDER — ATAZANAVIR AND COBICISTAT 300; 150 MG/1; MG/1
1 TABLET ORAL DAILY
Status: ON HOLD | COMMUNITY
End: 2021-10-09 | Stop reason: HOSPADM

## 2020-04-06 RX ORDER — INSULIN GLARGINE 100 [IU]/ML
25 INJECTION, SOLUTION SUBCUTANEOUS DAILY
Status: ON HOLD | COMMUNITY
End: 2020-04-06 | Stop reason: SDUPTHER

## 2020-04-06 RX ORDER — EMTRICITABINE AND TENOFOVIR ALAFENAMIDE 200; 25 MG/1; MG/1
1 TABLET ORAL DAILY
Status: ON HOLD | COMMUNITY
End: 2021-10-09 | Stop reason: HOSPADM

## 2020-04-06 RX ORDER — INSULIN GLARGINE 100 [IU]/ML
30 INJECTION, SOLUTION SUBCUTANEOUS NIGHTLY
Qty: 5 PEN | Refills: 3 | Status: SHIPPED | OUTPATIENT
Start: 2020-04-06 | End: 2020-04-08 | Stop reason: SDUPTHER

## 2020-04-06 RX ORDER — LANCETS 30 GAUGE
1 EACH MISCELLANEOUS 4 TIMES DAILY
Qty: 200 EACH | Refills: 5 | Status: ON HOLD | OUTPATIENT
Start: 2020-04-06 | End: 2021-10-09 | Stop reason: HOSPADM

## 2020-04-06 RX ORDER — INSULIN GLARGINE 100 [IU]/ML
30 INJECTION, SOLUTION SUBCUTANEOUS NIGHTLY
Status: DISCONTINUED | OUTPATIENT
Start: 2020-04-06 | End: 2020-04-07

## 2020-04-06 RX ORDER — GLUCOSAMINE HCL/CHONDROITIN SU 500-400 MG
CAPSULE ORAL
Qty: 200 STRIP | Refills: 5 | Status: SHIPPED | OUTPATIENT
Start: 2020-04-06 | End: 2020-04-07 | Stop reason: SDUPTHER

## 2020-04-06 RX ADMIN — CHLORDIAZEPOXIDE HYDROCHLORIDE 25 MG: 25 CAPSULE ORAL at 21:27

## 2020-04-06 RX ADMIN — METFORMIN HYDROCHLORIDE 1000 MG: 1000 TABLET ORAL at 09:52

## 2020-04-06 RX ADMIN — Medication 10 ML: at 10:31

## 2020-04-06 RX ADMIN — LISINOPRIL 40 MG: 20 TABLET ORAL at 09:52

## 2020-04-06 RX ADMIN — FOLIC ACID 1 MG: 1 TABLET ORAL at 09:52

## 2020-04-06 RX ADMIN — Medication 100 MG: at 09:52

## 2020-04-06 RX ADMIN — EMTRICITABINE AND TENOFOVIR ALAFENAMIDE 1 TABLET: 200; 25 TABLET ORAL at 09:52

## 2020-04-06 RX ADMIN — CITALOPRAM 20 MG: 20 TABLET, FILM COATED ORAL at 09:52

## 2020-04-06 RX ADMIN — INSULIN LISPRO 2 UNITS: 100 INJECTION, SOLUTION INTRAVENOUS; SUBCUTANEOUS at 21:28

## 2020-04-06 RX ADMIN — ATAZANAVIR 300 MG: 300 CAPSULE, GELATIN COATED ORAL at 09:52

## 2020-04-06 RX ADMIN — METFORMIN HYDROCHLORIDE 1000 MG: 1000 TABLET ORAL at 17:14

## 2020-04-06 RX ADMIN — CHLORDIAZEPOXIDE HYDROCHLORIDE 25 MG: 25 CAPSULE ORAL at 14:26

## 2020-04-06 RX ADMIN — INSULIN LISPRO 12 UNITS: 100 INJECTION, SOLUTION INTRAVENOUS; SUBCUTANEOUS at 15:44

## 2020-04-06 RX ADMIN — Medication 10 ML: at 00:21

## 2020-04-06 RX ADMIN — INSULIN GLARGINE 30 UNITS: 100 INJECTION, SOLUTION SUBCUTANEOUS at 21:30

## 2020-04-06 RX ADMIN — RITONAVIR 100 MG: 100 TABLET ORAL at 09:52

## 2020-04-06 RX ADMIN — INSULIN LISPRO 4 UNITS: 100 INJECTION, SOLUTION INTRAVENOUS; SUBCUTANEOUS at 09:47

## 2020-04-06 RX ADMIN — MONTELUKAST SODIUM 10 MG: 10 TABLET, FILM COATED ORAL at 21:27

## 2020-04-06 RX ADMIN — MULTIVITAMIN TABLET 1 TABLET: TABLET at 09:52

## 2020-04-06 RX ADMIN — HYDROXYZINE PAMOATE 50 MG: 50 CAPSULE ORAL at 09:51

## 2020-04-06 ASSESSMENT — PAIN SCALES - GENERAL
PAINLEVEL_OUTOF10: 0
PAINLEVEL_OUTOF10: 0

## 2020-04-06 NOTE — PROGRESS NOTES
BEHAVIORAL HEALTH FOLLOW-UP NOTE     4/6/2020     Patient was seen and examined in person, Chart reviewed   Patient's case discussed with staff/team    Chief Complaint: \"I want to go to rehab. \"    Interim History: Patient seen in his room with  he states he feels like the medication is helping. He states he feels \"happy if he goes to rehab but states he will be happy if he has to go back to his grandfather's house without going to rehab first\". He denies SI/HI intent or plan, he denies any auditory or visual hallucinations there were no overt overt signs psychosis. He is eating well sleeping well there are no neurovegetative signs of depression. He is a medication compliant. He has been attending some groups. Appetite:   [x] Normal/Unchanged  [] Increased  [] Decreased      Sleep:       [x] Normal/Unchanged  [] Fair       [] Poor              Energy:    [x] Normal/Unchanged  [] Increased  [] Decreased        SI [] Present  [x] Absent    HI  []Present  [x] Absent     Aggression:  [] yes  [x] no    Patient is [x] able  [] unable to CONTRACT FOR SAFETY     PAST MEDICAL/PSYCHIATRIC HISTORY:   Past Medical History:   Diagnosis Date    Diabetes mellitus [facility]     HIV (human immunodeficiency virus infection) ([facility]     Hypertension     Neuropathy        FAMILY/SOCIAL HISTORY:  History reviewed. No pertinent family history. Social History     Socioeconomic History    Marital status:       Spouse name: Not on file    Number of children: 0    Years of education: 8    Highest education level: Not on file   Occupational History     Employer: UNEMPLOYED   Social Needs    Financial resource strain: Not on file    Food insecurity     Worry: Not on file     Inability: Not on file   [employer] needs     Medical: Not on file     Non-medical: Not on file   Tobacco Use    Smoking status: Former Smoker     Packs/day: 2.00     Years: 20.00     Pack years: 40.00     Types: Cigarettes    200-25 MG per tablet 1 tablet, 1 tablet, Oral, Daily, Gita Todd MD, 1 tablet at 04/06/20 8625    atazanavir (REYATAZ) capsule 300 mg, 300 mg, Oral, Daily, Saroj Todd MD, 300 mg at 04/06/20 8076    ritonavir (NORVIR) tablet 100 mg, 100 mg, Oral, Daily, Saroj Todd MD, 100 mg at 04/06/20 0952    albuterol (PROVENTIL) nebulizer solution 2.5 mg, 2.5 mg, Nebulization, Q6H PRN, Saroj Todd MD      Examination:  /80   Pulse 87   Temp 98.5 °F (36.9 °C) (Temporal)   Resp 14   Ht 5' 8\" (1.727 m)   Wt 170 lb (77.1 kg)   SpO2 95%   BMI 25.85 kg/m²   Gait - steady  Medication side effects(SE): Denies    Mental Status Examination:    Level of consciousness:  within normal limits   Appearance:  fair grooming and fair hygiene  Behavior/Motor:  no abnormalities noted  Attitude toward examiner:  cooperative  Speech:  spontaneous, normal rate and normal volume   Mood: euthymic  Affect:  mood congruent  Thought processes:  linear   Thought content: Devoid of any auditory visualizations delusions or any perceptual abnormalities  Cognition:  oriented to person, place, and time   Concentration intact  Insight fair   Judgement fair     ASSESSMENT: Patient symptoms are:  [] Well controlled  [x] Improving  [] Worsening  [] No change      Diagnosis:   Principal Problem:    MDD (major depressive disorder), recurrent episode, moderate (HCC)  Active Problems:    Human immunodeficiency virus (HIV) disease ([facility]    Cocaine abuse ([facility]    Alcohol dependence ([facility]    Suicidal ideations  Resolved Problems:    * No resolved hospital problems. *      LABS:    No results for input(s): WBC, HGB, PLT in the last 72 hours.   Recent Labs     04/04/20  1845 04/05/20  0046 04/05/20  0921 04/06/20  0609   *  --  139 136   K 4.7  --  4.3 3.8   CL 92*  --  102 103   CO2 23  --  20* 20*   BUN 21*  --  23* 17   CREATININE 1.2  --  1.0 0.7   GLUCOSE 746* 833* 357* 255*     No results for input(s): BILITOT, ALKPHOS, AST, ALT in the last 72 hours. Lab Results   Component Value Date    LABAMPH NOT DETECTED 04/02/2020    BARBSCNU NOT DETECTED 04/02/2020    LABBENZ NOT DETECTED 04/02/2020    LABMETH NOT DETECTED 04/02/2020    OPIATESCREENURINE NOT DETECTED 04/02/2020    PHENCYCLIDINESCREENURINE NOT DETECTED 04/02/2020    ETOH <10 04/02/2020     Lab Results   Component Value Date    TSH 0.867 11/05/2014     No results found for: LITHIUM  No results found for: VALPROATE, CBMZ        Treatment Plan:  Reviewed current Medications with the patient. Celexa 20 mg daily  Risks, benefits, side effects, drug-to-drug interactions and alternatives to treatment were discussed. Collateral information:   CD evaluation  Encourage patient to attend group and other milieu activities.   Discharge planning discussed with the patient and treatment team.    PSYCHOTHERAPY/COUNSELING:  [x] Therapeutic interview  [x] Supportive  [] CBT  [] Ongoing  [] Other    [x] Patient continues to need, on a daily basis, active treatment furnished directly by or requiring the supervision of inpatient psychiatric personnel      Anticipated Length of stay:5-7 days            Electronically signed by NICHOLAS Arriaga CNP on 1/2/0014 at 1:39 PM

## 2020-04-06 NOTE — GROUP NOTE
Group Therapy Note    Date: 4/6/2020    Group Start Time: 0130  Group End Time: 0200  Group Topic: Cognitive Skills    [facility]    FRANCISCO Ibrahim LSW        Group Therapy Note    Attendees: 12         Patient's Goal:  Relaxation through [employer]      Notes:  Patient noted his stress level was a 3 after the group intervention. Status After Intervention:  Improved    Participation Level:  Active Listener and Interactive    Participation Quality: Appropriate and Attentive      Speech:  normal      Thought Process/Content: Logical      Affective Functioning: Congruent      Mood: depressed      Level of consciousness:  Attentive      Response to Learning: Progressing to goal      Endings: None Reported    Modes of Intervention: Education, Support and Socialization      Discipline Responsible: /Counselor      Signature:  FRANCISCO Ibrahim LSW

## 2020-04-06 NOTE — CARE COORDINATION
Per patient nurse  Pt must pay cash $26.00 dollars  for glucometer  [facility]. Patient must call  another provider which cover Glucometer. Glucometer would have to be delivered to  Patient' home    Heather (nurse)  report pt also has HIV medication that is not covered by  [facility] pharmacy.   ENRIQUE spoke with Sav Morgan @ [facility] who  Asked medication be faxed to [facility] [phone]  Phone  Number  [phone]

## 2020-04-06 NOTE — PLAN OF CARE
Addended by: PRADEEP BIRD on: 6/26/2019 08:58 AM     Modules accepted: Orders     Patient was out unit , social with peers. Denies suicidal thoughts or thoughts of harming others. Denies hearing any voices,seeing any shadows or anxiety or depression. Patient affect brighter, states feeling better. Iv right forearm, dressing is clean, dry intact, no redness or warm to touch,-patent when flushed. Compliant with medications , did not attend groups , no unit issues or behaviors. Safety rounds continue.

## 2020-04-06 NOTE — GROUP NOTE
Group Therapy Note    Date: 4/6/2020    Group Start Time: 1115  Group End Time: 7613  Group Topic: Cognitive Skills    [facility]        Group Therapy Note    Attendees: 11         Patient's Goal:  To identify cognitive distortions and ways to formulate positive thinking    Notes:  Pt participated and made positive connections during group    Status After Intervention:  Improved    Participation Level:  Active Listener and Interactive    Participation Quality: Appropriate, Attentive and Sharing      Speech:  normal      Thought Process/Content: Logical      Affective Functioning: Congruent      Mood: euthymic      Level of consciousness:  Oriented x4      Response to Learning: Able to verbalize current knowledge/experience and Able to verbalize/acknowledge new learning      Endings: None Reported    Modes of Intervention: Support, Socialization and Exploration      Discipline Responsible: /Counselor      Signature:  MIYA Berger

## 2020-04-06 NOTE — PROGRESS NOTES
Notified Dr Dana Muhammad via phone [phone] of patient discharge and need for HIV meds sent to be sent to outpatient pharmacy here

## 2020-04-06 NOTE — PROGRESS NOTES
[address] [facility]  Discharge Note    Pt discharged with followings belongings:   Dentures: None  Vision - Corrective Lenses: None  Hearing Aid: None  Jewelry: Bracelet, Necklace(1 bracelet, 1 necklace)  Body Piercings Removed: N/A  Clothing: Footwear, Jacket / coat, Pants, Shirt(1 pr shoes, 1 jacket, 1 pr pants, 1 t shirt)  Were All Patient Medications Collected?: Not Applicable  Other Valuables: Cell phone, Other (Comment)(1 cell phone, 1 lighter)   Valuables sent home with patient . Valuables retrieved from safe, Security envelope number:  none and returned to patient. Patient education on aftercare instructions: yes  Information faxed to n/a by n/a Patient verbalize understanding of AVS:  yes.     Status EXAM upon discharge:  Status and Exam  Normal: No  Facial Expression: Avoids Gaze, Worried  Affect: Appropriate  Level of Consciousness: Alert  Mood:Normal: No  Mood: Anxious  Motor Activity:Normal: No  Motor Activity: Decreased  Interview Behavior: Cooperative  Preception: [place] to Person, [place] to Time, [place] to Place, [place] to Situation  Attention:Normal: No  Attention: Distractible  Thought Processes: Blocking  Thought Content:Normal: No  Thought Content: Preoccupations  Hallucinations: None  Delusions: No  Memory:Normal: No  Memory: Poor Recent  Insight and Judgment: No  Insight and Judgment: Poor Judgment, Poor Insight  Present Suicidal Ideation: No  Present Homicidal Ideation: No      Metabolic Screening:    Lab Results   Component Value Date    LABA1C 12.2 (H) 04/05/2020       Lab Results   Component Value Date    CHOL 173 11/05/2014    CHOL 184 05/01/2014    CHOL 156 10/14/2013    CHOL 120 11/22/2010     Lab Results   Component Value Date    TRIG 93 11/05/2014    TRIG 245 (H) 05/01/2014    TRIG 302 (H) 10/14/2013    TRIG 84 11/22/2010     Lab Results   Component Value Date    HDL 44 11/05/2014    HDL 60 05/01/2014    HDL 58.0 10/14/2013    HDL 34.0 (A) 11/22/2010     No components found for: [facility]  Lab Results   Component Value Date    LABVLDL 19 11/05/2014    LABVLDL 49 05/01/2014       Sonja Garcia RN

## 2020-04-06 NOTE — PROGRESS NOTES
04/02/2020    PROT 6.9 04/02/2020    BILITOT 0.8 04/02/2020    BILIDIR 0.3 12/19/2014    IBILI 1.0 12/19/2014    LABALBU 4.1 04/02/2020    LABALBU 4.3 11/22/2010       PT/INR:  No results found for: PROTIME, INR    TSH:    Lab Results   Component Value Date    TSH 0.867 11/05/2014       U/A:    Lab Results   Component Value Date    COLORU Yellow 04/02/2020    PHUR 6.0 04/02/2020    WBCUA NONE 04/02/2020    RBCUA 0-1 04/02/2020    BACTERIA RARE 04/02/2020    CLARITYU Clear 04/02/2020    SPECGRAV 1.020 04/02/2020    LEUKOCYTESUR Negative 04/02/2020    UROBILINOGEN 0.2 04/02/2020    BILIRUBINUR Negative 04/02/2020    BLOODU SMALL 04/02/2020    GLUCOSEU >=1000 04/02/2020       ABG:  No results found for: HDA1TUK, BEART, F2FDKENB, PHART, THGBART, MHV2IKH, PO2ART, ZBM2BVN    Radiology :    Chest X ray -no infiltrates       www. Brigette Oropeza MD, Lab. Director    Absolute CD 3 1773  570 - 2400 cells/uL Final 04/03/2020  5:46 AM [facility]   Absolute CD 8 (Supp) 970  210 - 1200 cells/uL Final 04/03/2020  5:46 AM [facility]   CD4/CD8 Ratio 0.84  0.80 - 3.90 ratio Final 04/03/2020  5:46 AM [facility]   Absolute CD 4 [place] 812  430 - 1800 cells/uL Final 04/03/2020  5:46 AM [facility]       IMPRESSION:     1. HIV infection ( controlled as of July 2019 )  2. Substance abuse         RECOMMENDATIONS:      1. Thurlow Bright / Myra Barker ( reyataz and norvir in the hospital )   2. Albuterol inhaler PRN   3.  Follow up in [address] [place] [address] in 2-4 weeks

## 2020-04-07 LAB
METER GLUCOSE: 182 MG/DL (ref 74–99)
METER GLUCOSE: 291 MG/DL (ref 74–99)
METER GLUCOSE: 466 MG/DL (ref 74–99)
METER GLUCOSE: 79 MG/DL (ref 74–99)

## 2020-04-07 PROCEDURE — 6370000000 HC RX 637 (ALT 250 FOR IP): Performed by: INTERNAL MEDICINE

## 2020-04-07 PROCEDURE — 6370000000 HC RX 637 (ALT 250 FOR IP): Performed by: NURSE PRACTITIONER

## 2020-04-07 PROCEDURE — 99232 SBSQ HOSP IP/OBS MODERATE 35: CPT | Performed by: NURSE PRACTITIONER

## 2020-04-07 PROCEDURE — 82962 GLUCOSE BLOOD TEST: CPT

## 2020-04-07 PROCEDURE — 1240000000 HC EMOTIONAL WELLNESS R&B

## 2020-04-07 RX ORDER — BLOOD PRESSURE TEST KIT
1 KIT MISCELLANEOUS
Qty: 200 EACH | Refills: 5 | Status: SHIPPED | OUTPATIENT
Start: 2020-04-07

## 2020-04-07 RX ORDER — CHLORDIAZEPOXIDE HYDROCHLORIDE 25 MG/1
25 CAPSULE, GELATIN COATED ORAL EVERY 8 HOURS PRN
Status: DISCONTINUED | OUTPATIENT
Start: 2020-04-07 | End: 2020-04-08 | Stop reason: HOSPADM

## 2020-04-07 RX ORDER — LANOLIN ALCOHOL/MO/W.PET/CERES
3 CREAM (GRAM) TOPICAL NIGHTLY PRN
Qty: 14 TABLET | Refills: 0 | Status: ON HOLD
Start: 2020-04-07 | End: 2021-10-09 | Stop reason: HOSPADM

## 2020-04-07 RX ORDER — CHLORDIAZEPOXIDE HYDROCHLORIDE 25 MG/1
25 CAPSULE, GELATIN COATED ORAL EVERY 8 HOURS PRN
Qty: 9 CAPSULE | Refills: 0 | Status: SHIPPED | OUTPATIENT
Start: 2020-04-07 | End: 2020-04-08 | Stop reason: HOSPADM

## 2020-04-07 RX ORDER — FOLIC ACID 1 MG/1
1 TABLET ORAL DAILY
Qty: 14 TABLET | Refills: 0 | Status: ON HOLD
Start: 2020-04-08 | End: 2021-10-09 | Stop reason: HOSPADM

## 2020-04-07 RX ORDER — INSULIN GLARGINE 100 [IU]/ML
35 INJECTION, SOLUTION SUBCUTANEOUS NIGHTLY
Status: DISCONTINUED | OUTPATIENT
Start: 2020-04-07 | End: 2020-04-08 | Stop reason: HOSPADM

## 2020-04-07 RX ORDER — GLUCOSAMINE HCL/CHONDROITIN SU 500-400 MG
CAPSULE ORAL
Qty: 200 STRIP | Refills: 5 | Status: ON HOLD | OUTPATIENT
Start: 2020-04-07 | End: 2021-10-09 | Stop reason: HOSPADM

## 2020-04-07 RX ORDER — LANOLIN ALCOHOL/MO/W.PET/CERES
100 CREAM (GRAM) TOPICAL DAILY
Qty: 30 TABLET | Refills: 3 | Status: ON HOLD | OUTPATIENT
Start: 2020-04-08 | End: 2020-05-27 | Stop reason: SDUPTHER

## 2020-04-07 RX ORDER — MULTIVITAMIN WITH FOLIC ACID 400 MCG
1 TABLET ORAL DAILY
Qty: 14 TABLET | Refills: 0 | Status: SHIPPED | OUTPATIENT
Start: 2020-04-08 | End: 2020-05-27

## 2020-04-07 RX ORDER — CITALOPRAM 20 MG/1
20 TABLET ORAL DAILY
Qty: 14 TABLET | Refills: 0 | Status: ON HOLD
Start: 2020-04-08 | End: 2021-10-09 | Stop reason: HOSPADM

## 2020-04-07 RX ADMIN — ATAZANAVIR 300 MG: 300 CAPSULE, GELATIN COATED ORAL at 08:13

## 2020-04-07 RX ADMIN — METFORMIN HYDROCHLORIDE 1000 MG: 1000 TABLET ORAL at 16:07

## 2020-04-07 RX ADMIN — CITALOPRAM 20 MG: 20 TABLET, FILM COATED ORAL at 08:13

## 2020-04-07 RX ADMIN — MULTIVITAMIN TABLET 1 TABLET: TABLET at 08:12

## 2020-04-07 RX ADMIN — INSULIN HUMAN 5 UNITS: 100 INJECTION, SOLUTION PARENTERAL at 08:05

## 2020-04-07 RX ADMIN — FOLIC ACID 1 MG: 1 TABLET ORAL at 08:13

## 2020-04-07 RX ADMIN — LISINOPRIL 40 MG: 20 TABLET ORAL at 08:13

## 2020-04-07 RX ADMIN — INSULIN LISPRO 1 UNITS: 100 INJECTION, SOLUTION INTRAVENOUS; SUBCUTANEOUS at 22:26

## 2020-04-07 RX ADMIN — Medication 100 MG: at 08:12

## 2020-04-07 RX ADMIN — INSULIN GLARGINE 35 UNITS: 100 INJECTION, SOLUTION SUBCUTANEOUS at 22:27

## 2020-04-07 RX ADMIN — RITONAVIR 100 MG: 100 TABLET ORAL at 08:12

## 2020-04-07 RX ADMIN — METFORMIN HYDROCHLORIDE 1000 MG: 1000 TABLET ORAL at 08:13

## 2020-04-07 RX ADMIN — EMTRICITABINE AND TENOFOVIR ALAFENAMIDE 1 TABLET: 200; 25 TABLET ORAL at 08:13

## 2020-04-07 RX ADMIN — INSULIN LISPRO 6 UNITS: 100 INJECTION, SOLUTION INTRAVENOUS; SUBCUTANEOUS at 16:12

## 2020-04-07 RX ADMIN — INSULIN LISPRO 12 UNITS: 100 INJECTION, SOLUTION INTRAVENOUS; SUBCUTANEOUS at 08:05

## 2020-04-07 ASSESSMENT — PAIN SCALES - GENERAL: PAINLEVEL_OUTOF10: 0

## 2020-04-07 NOTE — PROGRESS NOTES
BEHAVIORAL HEALTH FOLLOW-UP NOTE     4/7/2020     Patient was seen and examined in person, Chart reviewed   Patient's case discussed with staff/team    Chief Complaint: \"I want to go to rehab. \"    Interim History: Up on the unit this morning showering attending group social/peers. His affect is bright and pleasant. Staff reports he had increased blood sugar this morning was found with multiple peanut butter and jelly sandwiches in his room. He states is because \"I like peanut butter and jelly sandwiches. \"  His affect is bright and pleasant. He denies SI/HI intent or plan he denies any auditory visualizations. He is eating well sleeping well there are no neurovegetative signs of depression. He is a medication compliant. He has been attending groups. Appetite:   [x] Normal/Unchanged  [] Increased  [] Decreased      Sleep:       [x] Normal/Unchanged  [] Fair       [] Poor              Energy:    [x] Normal/Unchanged  [] Increased  [] Decreased        SI [] Present  [x] Absent    HI  []Present  [x] Absent     Aggression:  [] yes  [x] no    Patient is [x] able  [] unable to CONTRACT FOR SAFETY     PAST MEDICAL/PSYCHIATRIC HISTORY:   Past Medical History:   Diagnosis Date    [facility]     HIV (human immunodeficiency virus infection) ([facility]     Hypertension     Neuropathy        FAMILY/SOCIAL HISTORY:  History reviewed. No pertinent family history. Social History     Socioeconomic History    Marital status:       Spouse name: Not on file    Number of children: 0    Years of education: 8    Highest education level: Not on file   Occupational History     Employer: UNEMPLOYED   Social Needs    Financial resource strain: Not on file    Food insecurity     Worry: Not on file     Inability: Not on file   [employer] needs     Medical: Not on file     Non-medical: Not on file   Tobacco Use    Smoking status: Former Smoker     Packs/day: 2.00     Years: 20.00     Pack years: 40.00
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
## 2020-04-07 NOTE — GROUP NOTE
Group Therapy Note    Date: 4/6/2020    Group Start Time: 1945  Group End Time: 2035  Group Topic: Healthy Living/Wellness    [facility]  [address], RN        Group Therapy Note    Attendees: 12/20

## 2020-04-07 NOTE — GROUP NOTE
Group Therapy Note    Date: 4/7/2020    Group Start Time: 1010  Group End Time: 3617  Group Topic: Psychoeducation    [facility] [address]        Group Therapy Note    Attendees: 16       Notes: Active and engaged during discussion on forgiveness. Able to share and support peers during activity. Status After Intervention:  Improved    Participation Level:  Active Listener    Participation Quality: Appropriate and Attentive      Speech:  normal      Thought Process/Content: Logical      Affective Functioning: Congruent      Mood: euthymic      Level of consciousness:  Alert and Attentive      Response to Learning: Progressing to goal      Endings: None Reported    Modes of Intervention: Education, Support, Socialization and Exploration      Discipline Responsible: Psychoeducational Specialist      Signature:  Naima Castaneda, [address]

## 2020-04-07 NOTE — PROGRESS NOTES
PATIENT MEDS FROM PHARMACY PLACED IN REFRIGERATOR IN MED ROOM IN ANTICIPATION FOR DISCHARGE TOMORROW.

## 2020-04-07 NOTE — PROGRESS NOTES
[facility]  Discharge Note    Pt discharged with followings belongings:   Dentures: None  Vision - Corrective Lenses: None  Hearing Aid: None  Jewelry: Bracelet, Necklace(1 bracelet, 1 necklace)  Body Piercings Removed: N/A  Clothing: Footwear, Jacket / coat, Pants, Shirt(1 pr shoes, 1 jacket, 1 pr pants, 1 t shirt)  Were All Patient Medications Collected?: Not Applicable  Other Valuables: Cell phone, Other (Comment)(1 cell phone, 1 lighter)   Valuables sent home with patient . Valuables retrieved from safe, Security envelope number:  Home meds and returned to patient. Patient education on aftercare instructions: yes  Information faxed to n/a by n/a Patient verbalize understanding of AVS:  yes.     Status EXAM upon discharge:  Status and Exam  Normal: No  Facial Expression: Avoids Gaze  Affect: Appropriate  Level of Consciousness: Alert  Mood:Normal: No  Mood: Anxious  Motor Activity:Normal: No  Motor Activity: Decreased  Interview Behavior: Cooperative  Preception: [place] to Person, [place] to Time, [place] to Place, [place] to Situation  Attention:Normal: No  Attention: Distractible  Thought Processes: Circumstantial  Thought Content:Normal: Yes  Thought Content: Preoccupations  Hallucinations: None  Delusions: No  Memory:Normal: No  Memory: Poor Recent  Insight and Judgment: No  Insight and Judgment: Poor Judgment, Poor Insight  Present Suicidal Ideation: No  Present Homicidal Ideation: No      Metabolic Screening:    Lab Results   Component Value Date    LABA1C 12.2 (H) 04/05/2020       Lab Results   Component Value Date    CHOL 173 11/05/2014    CHOL 184 05/01/2014    CHOL 156 10/14/2013    CHOL 120 11/22/2010     Lab Results   Component Value Date    TRIG 93 11/05/2014    TRIG 245 (H) 05/01/2014    TRIG 302 (H) 10/14/2013    TRIG 84 11/22/2010     Lab Results   Component Value Date    HDL 44 11/05/2014    HDL 60 05/01/2014    HDL 58.0 10/14/2013    HDL 34.0 (A) 11/22/2010     No components found for: [facility]  Lab Results   Component Value Date    LABVLDL 19 11/05/2014    LABVLDL 49 05/01/2014       Max Wells RN

## 2020-04-08 VITALS
HEIGHT: 68 IN | TEMPERATURE: 97.9 F | OXYGEN SATURATION: 95 % | BODY MASS INDEX: 25.76 KG/M2 | HEART RATE: 94 BPM | RESPIRATION RATE: 14 BRPM | WEIGHT: 170 LBS | SYSTOLIC BLOOD PRESSURE: 118 MMHG | DIASTOLIC BLOOD PRESSURE: 72 MMHG

## 2020-04-08 LAB
CORTISOL TOTAL: 10.66 MCG/DL (ref 2.68–18.4)
METER GLUCOSE: 133 MG/DL (ref 74–99)
METER GLUCOSE: 246 MG/DL (ref 74–99)
METER GLUCOSE: 274 MG/DL (ref 74–99)
TSH SERPL DL<=0.05 MIU/L-ACNC: 2.68 UIU/ML (ref 0.27–4.2)

## 2020-04-08 PROCEDURE — 99231 SBSQ HOSP IP/OBS SF/LOW 25: CPT | Performed by: INTERNAL MEDICINE

## 2020-04-08 PROCEDURE — 99239 HOSP IP/OBS DSCHRG MGMT >30: CPT | Performed by: NURSE PRACTITIONER

## 2020-04-08 PROCEDURE — 36415 COLL VENOUS BLD VENIPUNCTURE: CPT

## 2020-04-08 PROCEDURE — 82533 TOTAL CORTISOL: CPT

## 2020-04-08 PROCEDURE — 84443 ASSAY THYROID STIM HORMONE: CPT

## 2020-04-08 PROCEDURE — 82962 GLUCOSE BLOOD TEST: CPT

## 2020-04-08 PROCEDURE — 6370000000 HC RX 637 (ALT 250 FOR IP): Performed by: NURSE PRACTITIONER

## 2020-04-08 PROCEDURE — 6370000000 HC RX 637 (ALT 250 FOR IP): Performed by: INTERNAL MEDICINE

## 2020-04-08 RX ORDER — INSULIN GLARGINE 100 [IU]/ML
35 INJECTION, SOLUTION SUBCUTANEOUS NIGHTLY
Qty: 5 PEN | Refills: 5 | Status: ON HOLD | OUTPATIENT
Start: 2020-04-08 | End: 2020-05-27 | Stop reason: SDUPTHER

## 2020-04-08 RX ORDER — LISINOPRIL 5 MG/1
40 TABLET ORAL DAILY
Qty: 30 TABLET | Refills: 3 | Status: SHIPPED | OUTPATIENT
Start: 2020-04-08 | End: 2020-04-08 | Stop reason: SDUPTHER

## 2020-04-08 RX ORDER — LISINOPRIL 40 MG/1
40 TABLET ORAL DAILY
Qty: 30 TABLET | Refills: 3 | Status: SHIPPED | OUTPATIENT
Start: 2020-04-08

## 2020-04-08 RX ADMIN — Medication 100 MG: at 08:46

## 2020-04-08 RX ADMIN — RITONAVIR 100 MG: 100 TABLET ORAL at 08:47

## 2020-04-08 RX ADMIN — CITALOPRAM 20 MG: 20 TABLET, FILM COATED ORAL at 08:47

## 2020-04-08 RX ADMIN — ATAZANAVIR 300 MG: 300 CAPSULE, GELATIN COATED ORAL at 08:46

## 2020-04-08 RX ADMIN — METFORMIN HYDROCHLORIDE 1000 MG: 1000 TABLET ORAL at 08:47

## 2020-04-08 RX ADMIN — FOLIC ACID 1 MG: 1 TABLET ORAL at 08:47

## 2020-04-08 RX ADMIN — MULTIVITAMIN TABLET 1 TABLET: TABLET at 08:47

## 2020-04-08 RX ADMIN — EMTRICITABINE AND TENOFOVIR ALAFENAMIDE 1 TABLET: 200; 25 TABLET ORAL at 08:46

## 2020-04-08 RX ADMIN — LISINOPRIL 40 MG: 20 TABLET ORAL at 08:47

## 2020-04-08 RX ADMIN — INSULIN LISPRO 6 UNITS: 100 INJECTION, SOLUTION INTRAVENOUS; SUBCUTANEOUS at 08:44

## 2020-04-08 ASSESSMENT — PAIN SCALES - GENERAL
PAINLEVEL_OUTOF10: 0
PAINLEVEL_OUTOF10: 0

## 2020-04-08 NOTE — CARE COORDINATION
ENRIQUE note: d/c planning: sw spoke with pts grandfather on phone. He states he feels pt is ready for d/c. There are no weapons in the home. HE states that pt has been a help to him at times and is agreeable to pt staying with him.

## 2020-04-08 NOTE — PROGRESS NOTES
[address] [facility]  Discharge Note    Pt given diabetic education packet and educated on need for BS checks 4 x's daily. Pt verbalized when to check his BS. Pt verbalized dose when questioned compared to the scale in his discharge instructions. Pt verbalized lantus order and hypo and hyperglyemic symptoms. Pt discharged with followings belongings:   Dentures: None  Vision - Corrective Lenses: None  Hearing Aid: None  Jewelry: Bracelet, Necklace(1 bracelet, 1 necklace)  Body Piercings Removed: N/A  Clothing: Footwear, Jacket / coat, Pants, Shirt(1 pr shoes, 1 jacket, 1 pr pants, 1 t shirt)  Were All Patient Medications Collected?: Not Applicable  Other Valuables: Cell phone, Other (Comment)(1 cell phone, 1 lighter)   Valuables sent home with pt. Valuables retrieved from safe, Security envelope number:  n/a and returned to patient. Patient education on aftercare instructions: yes  Patient verbalize understanding of AVS:  yes.     Status EXAM upon discharge:  Status and Exam  Normal: No  Facial Expression: Avoids Gaze  Affect: Blunt  Level of Consciousness: Alert  Mood:Normal: No  Mood: Irritable  Motor Activity:Normal: No  Motor Activity: Decreased  Interview Behavior: Cooperative, Evasive, Irritable  Preception: [place] to Person, [place] to Time, [place] to Place, [place] to Situation  Attention:Normal: No  Attention: Distractible  Thought Processes: Other(See comment)  Thought Content:Normal: No  Thought Content: Preoccupations  Hallucinations: None  Delusions: No  Memory:Normal: No  Memory: Poor Recent  Insight and Judgment: No  Insight and Judgment: Poor Judgment, Poor Insight  Present Suicidal Ideation: No  Present Homicidal Ideation: No      Metabolic Screening:    Lab Results   Component Value Date    LABA1C 12.2 (H) 04/05/2020       Lab Results   Component Value Date    CHOL 173 11/05/2014    CHOL 184 05/01/2014    CHOL 156 10/14/2013    CHOL 120 11/22/2010     Lab Results   Component Value Date

## 2020-04-08 NOTE — DISCHARGE SUMMARY
DISCHARGE SUMMARY      Patient ID:  Jez Schmidt  [id number]  1 y.o.  1975    Admit date: 4/2/2020    Discharge date and time: 4/8/2020    Admitting Physician: Afua Faith MD     Discharge Physician: Dr Indigo Schwarz MD    Admission Diagnoses: Suicidal ideations [R45.851]    Admission Condition: poor    Discharged Condition: stable    Admission Circumstance: Patient brought himself into the ED complaining of being out of his medications and suicidal with a plan to overdose on medications      PAST MEDICAL/PSYCHIATRIC HISTORY:   Past Medical History:   Diagnosis Date    [facility] mellitus ([facility]     HIV ([facility] immunodeficiency virus infection) ([facility]     Hypertension     Neuropathy        FAMILY/SOCIAL HISTORY:  History reviewed. No pertinent family history. Social History     Socioeconomic History    Marital status:      Spouse name: Not on file    Number of children: 0    Years of education: 8    Highest education level: Not on file   Occupational History     Employer: UNEMPLOYED   Social Needs    Financial resource strain: Not on file    Food insecurity     Worry: Not on file     Inability: Not on file   [employer] needs     Medical: Not on file     Non-medical: Not on file   Tobacco Use    Smoking status: Former Smoker     Packs/day: 2.00     Years: 20.00     Pack years: 40.00     Types: Cigarettes    Smokeless tobacco: Never Used   Substance and Sexual Activity    Alcohol use:  Yes     Alcohol/week: 0.0 standard drinks     Comment: states that he drinks a lot and did so today     Drug use: Yes     Types: Marijuana, Cocaine     Comment: ysterday 4/1/2020    Sexual activity: Never   Lifestyle    Physical activity     Days per week: Not on file     Minutes per session: Not on file    Stress: Not on file   Relationships    Social connections     Talks on phone: Not on file     Gets together: Not on file     Attends Rastafarian service: Not on file     Active member of club or 10 mL, 10 mL, Intravenous, PRN, Luke Granados MD    hydrOXYzine (VISTARIL) capsule 50 mg, 50 mg, Oral, TID PRN, Luke Granados MD, 50 mg at 04/06/20 0951    haloperidol lactate (HALDOL) injection 5 mg, 5 mg, Intramuscular, Q6H PRN **OR** haloperidol (HALDOL) tablet 5 mg, 5 mg, Oral, Q6H PRN, Luke Granados MD    magnesium hydroxide (MILK OF MAGNESIA) 400 MG/5ML suspension 30 mL, 30 mL, Oral, Daily PRN, Luke Granados MD    aluminum & magnesium hydroxide-simethicone (MAALOX) 200-200-20 MG/5ML suspension 30 mL, 30 mL, Oral, PRN, Luke Granados MD    multivitamin 1 tablet, 1 tablet, Oral, Daily, NICHOLAS Holguin CNP, 1 tablet at 88/80/76 2138    folic acid (FOLVITE) tablet 1 mg, 1 mg, Oral, Daily, NICHOLAS Holguin CNP, 1 mg at 04/08/20 0847    vitamin B-1 (THIAMINE) tablet 100 mg, 100 mg, Oral, Daily, Emory Barrier NICHOLAS Black - CNP, 405 mg at 04/08/20 0846    melatonin tablet 3 mg, 3 mg, Oral, Nightly PRN, NICHOLAS Holguin CNP, 3 mg at 04/04/20 2051    emtricitabine-tenofovir alafenamide (DESCOVY) 200-25 MG per tablet 1 tablet, 1 tablet, Oral, Daily, Boubacar Todd MD, 1 tablet at 04/08/20 0846    atazanavir (REYATAZ) capsule 300 mg, 300 mg, Oral, Daily, Saroj Todd MD, 300 mg at 04/08/20 0846    ritonavir (NORVIR) tablet 100 mg, 100 mg, Oral, Daily, Saroj Todd MD, 100 mg at 04/08/20 0847    albuterol (PROVENTIL) nebulizer solution 2.5 mg, 2.5 mg, Nebulization, Q6H PRN, Saroj Todd MD    Examination:  /72   Pulse 94   Temp 97.9 °F (36.6 °C) (Temporal)   Resp 14   Ht 5' 8\" (1.727 m)   Wt 170 lb (77.1 kg)   SpO2 95%   BMI 25.85 kg/m²   Gait - steady    HOSPITAL COURSE[de-identified]  Patient admitted to unit 4/2/was closely monitored for suicidal ideations. He is evaluated was to his Celexa 10 mg daily which is optimized up to 20 mg daily. He is also followed by medical for management of his diabetes and also his HIV medication.   Patient is up on the unit he started       Medication List      START taking these medications    Everett Smith 100 UNIT/ML injection pen  Generic drug:  insulin glargine  Inject 35 Units into the skin nightly     citalopram 20 MG tablet  Commonly known as:  CELEXA  Take 1 tablet by mouth daily for 14 days     folic acid 1 MG tablet  Commonly known as:  FOLVITE  Take 1 tablet by mouth daily for 14 days     glucose monitoring kit monitoring kit  Check blood sugar once daily fasting. May substitute brand for insurance coverage  Replaces:  Accu-Chek Advantage Diabetes Kit     insulin lispro 100 UNIT/ML injection vial  Commonly known as:  HUMALOG  Inject 0-12 Units into the skin 3 times daily (with meals)     melatonin 3 MG Tabs tablet  Take 1 tablet by mouth nightly as needed (sleep)     multivitamin tablet  Take 1 tablet by mouth daily for 14 days     thiamine 100 MG tablet  Take 1 tablet by mouth daily        CHANGE how you take these medications    Alcohol Swabs Pads  1 strip by Does not apply route 4 times daily (after meals and at bedtime)  What changed:    · how much to take  · how to take this  · when to take this  · additional instructions     blood glucose test strips  Test 4x times a day & as needed for symptoms of irregular blood glucose. May substitute for insurance coverage  What changed:  additional instructions     Insulin Syringe-Needle U-100 25G X 1\" 1 ML Misc  1 each by Does not apply route 4 times daily (after meals and at bedtime) Use as directed May substitute for insurance coverage  What changed:    · medication strength  · how much to take  · how to take this  · when to take this  · additional instructions  · Another medication with the same name was removed. Continue taking this medication, and follow the directions you see here. Lancets Misc  1 each by Does not apply route 4 times daily  What changed:    · how much to take  · how to take this  · when to take this  · Another medication with the same name was removed. [id number] [facility] [place], [address] [place] [address]    Phone:  [phone]   · Basaglar KwikPen 100 UNIT/ML injection pen  · citalopram 20 MG tablet  · folic acid 1 MG tablet  · glucose monitoring kit monitoring kit  · insulin lispro 100 UNIT/ML injection vial  · Insulin Syringe-Needle U-100 25G X 1\" 1 ML Misc  · Lancets Misc  · lisinopril 40 MG tablet  · melatonin 3 MG Tabs tablet  · metFORMIN 1000 MG tablet  · multivitamin tablet  · thiamine 100 MG tablet       Patient is counseled if he continues to abuse drugs or alcohol he could act out impulsively causing serious harm to himself or others even though may be unintentional.  He demonstrated understanding of this and has the capacity understand this    Patient is counseled most been compliant with all medications all outpatient follow-up appointments    Patient is discharged home in stable condition    TIME SPEND - 35 MINUTES TO COMPLETE THE EVALUATION, DISCHARGE SUMMARY, MEDICATION RECONCILIATION AND FOLLOW UP CARE     Signed:  Charles Rivero  2/5/8679  2:28 PM

## 2020-04-08 NOTE — GROUP NOTE
Date: 4/8/2020    Group Start Time: 2820  Group End Time: 1055  Group Topic: Psychoeducation    [facility]  [address] [address], [address]                                                                             Group Therapy Note    Date: 4/8/2020    Type of Group: Psychoeducation    Wellness Binder Information  Module Name:  id of stressors  Patient's Goal: patient will bel able to id daily stressors experienced prior to admission. Notes:  pleasant and engaged in group, able to share when prompted. Status After Intervention:  Improved    Participation Level:  Active Listener and Interactive    Participation Quality: Appropriate, Attentive, Sharing, and Supportive    Speech: normal     Thought Process/Content: Logical    Affective Functioning: Congruent    Mood: euthymic    Level of consciousness:  Alert, Oriented x4, and Attentive    Response to Learning: Able to verbalize/acknowledge new learning, Able to retain information, and Progressing to goal    Endings: None Reported    Modes of Intervention: Education, Support, Socialization, Exploration, and Problem-solving    Discipline Responsible: Psychoeducational Specialist    Signature:  Matilde Talavera

## 2020-04-08 NOTE — GROUP NOTE
Group Therapy Note    Date: 4/8/2020    Group Start Time: 1115  Group End Time: 1200  Group Topic: Cognitive Skills    [facility], [place]        Group Therapy Note    Attendees: 11       Patient's Goal:  To identify protective factors and ways to use them to maintain wellness    Notes:  Pt participated and made positive connections    Status After Intervention:  Improved    Participation Level:  Active Listener and Interactive    Participation Quality: Appropriate, Attentive and Sharing      Speech:  normal      Thought Process/Content: Logical      Affective Functioning: Congruent      Mood: euthymic      Level of consciousness:  Oriented x4      Response to Learning: Able to verbalize current knowledge/experience and Able to verbalize/acknowledge new learning      Endings: None Reported    Modes of Intervention: Support, Socialization and Exploration      Discipline Responsible: /Counselor      Signature:  MIYA Onofre

## 2020-04-08 NOTE — PROGRESS NOTES
[address]   Consult    Patient:  Wenceslao Maciel 54 y.o. male MRN: [id number]     Date of Service: 2020      Reason for Consult: diabetic and hyhpertensive meds    SUBJECTIVE     Patient seen and examined this morning, lying in bed comfortably . He denies any subjective complaints, no SOB, , chest pain, palpitations, abdominal pain, diarrhea, n/v.     24 hour event:   BS this morning arounds 200-270s required Lantus 35 units + 19 units DD + metformin 2000 mg in the last 24 hours     OBJECTIVE   · Vitals: /72   Pulse 94   Temp 97.9 °F (36.6 °C) (Temporal)   Resp 14   Ht 5' 8\" (1.727 m)   Wt 170 lb (77.1 kg)   SpO2 95%   BMI 25.85 kg/m²   · General Appearance: Alert, cooperative, no acute distress. · HEENT: Normocephalic, atraumatic. · Neck: Supple, symmetrical, trachea midline,   · Chest wall: No tenderness or deformity, full & symmetric excursion  · Lung: Clear to auscultation bilaterally,  respirations unlabored. No rales/wheezing/rubs  · Heart: Regular rate and rhythm, S1 and S2 normal, no murmur, rub or gallop. DP pulses 2/4,    · Abdomen: Soft, non-tender, bowel sounds active all four quadrants, no masses, no organomegaly, No guarding, rebound or rigidity. · Extremities:  Extremities normal, atraumatic, no cyanosis or edema. Pulses equal bilaterally  · Skin: Skin color, texture, turgor normal, no rashes or lesions  · Musculoskeletal: No joint swelling, no muscle tenderness. ROM normal in all joints of extremities.    · Neurologic: alert and oriented, stuttering, no focal neurologic deficit     Labs and Imaging Studies   Basic Labs  CBC:   Lab Results   Component Value Date    WBC 8.8 2020    RBC 4.93 2020    HGB 14.5 2020    HCT 43.9 2020    MCV 89.0 2020    MCH 29.4 2020    MCHC 33.0 2020    RDW 14.3 2020     2020    MPV 11.0 2020     CMP:    Lab Results   Component Value Date     2020    K 3.8

## 2020-04-08 NOTE — PROGRESS NOTES
[facility]  Internal Medicine Residency / [address]    Attending Physician Statement  I have discussed the case, including pertinent history and exam findings with the resident and the team.  I have seen and examined the patient, reviewed meds and pertinent labs and the key elements of the encounter have been performed by me. I agree with the assessment, plan and orders as documented by the resident. Blood sugar control improved since increased dose lantus. Patient also states that he understands the sliding scale as well. Ok to discharge from medicine standpoint. Patient states that he does not have a primary care physician. He also states that there is a clinic near his home but will consider coming to our clinic as well. Remainder of medical problems as per resident note.       Cooper Cain  Internal Medicine Residency Faculty

## 2020-04-13 LAB
DIRECT EXAM: NORMAL
SPECIMEN: NORMAL

## 2020-05-21 ENCOUNTER — HOSPITAL ENCOUNTER (INPATIENT)
Age: 45
LOS: 6 days | Discharge: OTHER INSTITUTION | DRG: 753 | End: 2020-05-27
Attending: EMERGENCY MEDICINE | Admitting: PSYCHIATRY & NEUROLOGY
Payer: COMMERCIAL

## 2020-05-21 PROBLEM — F39 MOOD DISORDER (HCC): Status: ACTIVE | Noted: 2020-05-21

## 2020-05-21 LAB
ACETAMINOPHEN LEVEL: <5 MCG/ML (ref 10–30)
ALBUMIN SERPL-MCNC: 4.1 G/DL (ref 3.5–5.2)
ALP BLD-CCNC: 64 U/L (ref 40–129)
ALT SERPL-CCNC: 38 U/L (ref 0–40)
AMPHETAMINE SCREEN, URINE: NOT DETECTED
ANION GAP SERPL CALCULATED.3IONS-SCNC: 14 MMOL/L (ref 7–16)
AST SERPL-CCNC: 34 U/L (ref 0–39)
BACTERIA: ABNORMAL /HPF
BARBITURATE SCREEN URINE: NOT DETECTED
BASOPHILS ABSOLUTE: 0.06 E9/L (ref 0–0.2)
BASOPHILS RELATIVE PERCENT: 0.8 % (ref 0–2)
BENZODIAZEPINE SCREEN, URINE: NOT DETECTED
BILIRUB SERPL-MCNC: 2.4 MG/DL (ref 0–1.2)
BILIRUBIN DIRECT: 0.4 MG/DL (ref 0–0.3)
BILIRUBIN URINE: NEGATIVE
BLOOD, URINE: ABNORMAL
BUN BLDV-MCNC: 20 MG/DL (ref 6–20)
CALCIUM SERPL-MCNC: 9 MG/DL (ref 8.6–10.2)
CANNABINOID SCREEN URINE: NOT DETECTED
CHLORIDE BLD-SCNC: 100 MMOL/L (ref 98–107)
CLARITY: CLEAR
CO2: 21 MMOL/L (ref 22–29)
COCAINE METABOLITE SCREEN URINE: POSITIVE
COLOR: YELLOW
CREAT SERPL-MCNC: 0.9 MG/DL (ref 0.7–1.2)
EKG ATRIAL RATE: 87 BPM
EKG P AXIS: 75 DEGREES
EKG P-R INTERVAL: 124 MS
EKG Q-T INTERVAL: 388 MS
EKG QRS DURATION: 114 MS
EKG QTC CALCULATION (BAZETT): 466 MS
EKG R AXIS: 62 DEGREES
EKG T AXIS: 54 DEGREES
EKG VENTRICULAR RATE: 87 BPM
EOSINOPHILS ABSOLUTE: 0.11 E9/L (ref 0.05–0.5)
EOSINOPHILS RELATIVE PERCENT: 1.4 % (ref 0–6)
ETHANOL: <10 MG/DL (ref 0–0.08)
FENTANYL SCREEN, URINE: NOT DETECTED
GFR AFRICAN AMERICAN: >60
GFR NON-AFRICAN AMERICAN: >60 ML/MIN/1.73
GLUCOSE BLD-MCNC: 245 MG/DL (ref 74–99)
GLUCOSE URINE: >=1000 MG/DL
HCT VFR BLD CALC: 41.7 % (ref 37–54)
HEMOGLOBIN: 13.9 G/DL (ref 12.5–16.5)
IMMATURE GRANULOCYTES #: 0.04 E9/L
IMMATURE GRANULOCYTES %: 0.5 % (ref 0–5)
KETONES, URINE: NEGATIVE MG/DL
LEUKOCYTE ESTERASE, URINE: NEGATIVE
LYMPHOCYTES ABSOLUTE: 2.44 E9/L (ref 1.5–4)
LYMPHOCYTES RELATIVE PERCENT: 31.6 % (ref 20–42)
Lab: ABNORMAL
MCH RBC QN AUTO: 29.8 PG (ref 26–35)
MCHC RBC AUTO-ENTMCNC: 33.3 % (ref 32–34.5)
MCV RBC AUTO: 89.5 FL (ref 80–99.9)
METER GLUCOSE: 383 MG/DL (ref 74–99)
METHADONE SCREEN, URINE: NOT DETECTED
MONOCYTES ABSOLUTE: 0.91 E9/L (ref 0.1–0.95)
MONOCYTES RELATIVE PERCENT: 11.8 % (ref 2–12)
NEUTROPHILS ABSOLUTE: 4.15 E9/L (ref 1.8–7.3)
NEUTROPHILS RELATIVE PERCENT: 53.9 % (ref 43–80)
NITRITE, URINE: NEGATIVE
OPIATE SCREEN URINE: NOT DETECTED
OXYCODONE URINE: NOT DETECTED
PDW BLD-RTO: 14.1 FL (ref 11.5–15)
PH UA: 5.5 (ref 5–9)
PHENCYCLIDINE SCREEN URINE: NOT DETECTED
PLATELET # BLD: 295 E9/L (ref 130–450)
PMV BLD AUTO: 10.7 FL (ref 7–12)
POTASSIUM SERPL-SCNC: 4 MMOL/L (ref 3.5–5)
PROTEIN UA: NEGATIVE MG/DL
RBC # BLD: 4.66 E12/L (ref 3.8–5.8)
RBC UA: ABNORMAL /HPF (ref 0–2)
SALICYLATE, SERUM: <0.3 MG/DL (ref 0–30)
SARS-COV-2, NAAT: NOT DETECTED
SODIUM BLD-SCNC: 135 MMOL/L (ref 132–146)
SPECIFIC GRAVITY UA: 1.02 (ref 1–1.03)
TOTAL PROTEIN: 6.7 G/DL (ref 6.4–8.3)
TRICYCLIC ANTIDEPRESSANTS SCREEN SERUM: NEGATIVE NG/ML
UROBILINOGEN, URINE: 0.2 E.U./DL
WBC # BLD: 7.7 E9/L (ref 4.5–11.5)
WBC UA: ABNORMAL /HPF (ref 0–5)

## 2020-05-21 PROCEDURE — 80307 DRUG TEST PRSMV CHEM ANLYZR: CPT

## 2020-05-21 PROCEDURE — 82248 BILIRUBIN DIRECT: CPT

## 2020-05-21 PROCEDURE — 6370000000 HC RX 637 (ALT 250 FOR IP): Performed by: INTERNAL MEDICINE

## 2020-05-21 PROCEDURE — 81001 URINALYSIS AUTO W/SCOPE: CPT

## 2020-05-21 PROCEDURE — U0002 COVID-19 LAB TEST NON-CDC: HCPCS

## 2020-05-21 PROCEDURE — 93005 ELECTROCARDIOGRAM TRACING: CPT | Performed by: PHYSICIAN ASSISTANT

## 2020-05-21 PROCEDURE — 99285 EMERGENCY DEPT VISIT HI MDM: CPT

## 2020-05-21 PROCEDURE — 36415 COLL VENOUS BLD VENIPUNCTURE: CPT

## 2020-05-21 PROCEDURE — 85025 COMPLETE CBC W/AUTO DIFF WBC: CPT

## 2020-05-21 PROCEDURE — 6370000000 HC RX 637 (ALT 250 FOR IP): Performed by: NURSE PRACTITIONER

## 2020-05-21 PROCEDURE — G0480 DRUG TEST DEF 1-7 CLASSES: HCPCS

## 2020-05-21 PROCEDURE — 6370000000 HC RX 637 (ALT 250 FOR IP): Performed by: PSYCHIATRY & NEUROLOGY

## 2020-05-21 PROCEDURE — 93005 ELECTROCARDIOGRAM TRACING: CPT | Performed by: INTERNAL MEDICINE

## 2020-05-21 PROCEDURE — 6370000000 HC RX 637 (ALT 250 FOR IP): Performed by: PHYSICIAN ASSISTANT

## 2020-05-21 PROCEDURE — 93010 ELECTROCARDIOGRAM REPORT: CPT | Performed by: INTERNAL MEDICINE

## 2020-05-21 PROCEDURE — 80053 COMPREHEN METABOLIC PANEL: CPT

## 2020-05-21 PROCEDURE — 1240000000 HC EMOTIONAL WELLNESS R&B

## 2020-05-21 PROCEDURE — 82962 GLUCOSE BLOOD TEST: CPT

## 2020-05-21 RX ORDER — TRAZODONE HYDROCHLORIDE 50 MG/1
50 TABLET ORAL NIGHTLY PRN
Status: DISCONTINUED | OUTPATIENT
Start: 2020-05-21 | End: 2020-05-27 | Stop reason: HOSPADM

## 2020-05-21 RX ORDER — HALOPERIDOL 5 MG
5 TABLET ORAL EVERY 6 HOURS PRN
Status: DISCONTINUED | OUTPATIENT
Start: 2020-05-21 | End: 2020-05-27 | Stop reason: HOSPADM

## 2020-05-21 RX ORDER — HYDROXYZINE PAMOATE 50 MG/1
50 CAPSULE ORAL 3 TIMES DAILY PRN
Status: DISCONTINUED | OUTPATIENT
Start: 2020-05-21 | End: 2020-05-27 | Stop reason: HOSPADM

## 2020-05-21 RX ORDER — NICOTINE 21 MG/24HR
1 PATCH, TRANSDERMAL 24 HOURS TRANSDERMAL DAILY
Status: DISCONTINUED | OUTPATIENT
Start: 2020-05-21 | End: 2020-05-24

## 2020-05-21 RX ORDER — ACETAMINOPHEN 325 MG/1
650 TABLET ORAL EVERY 6 HOURS PRN
Status: DISCONTINUED | OUTPATIENT
Start: 2020-05-21 | End: 2020-05-27 | Stop reason: HOSPADM

## 2020-05-21 RX ORDER — LISINOPRIL 20 MG/1
40 TABLET ORAL DAILY
Status: DISCONTINUED | OUTPATIENT
Start: 2020-05-22 | End: 2020-05-27 | Stop reason: HOSPADM

## 2020-05-21 RX ORDER — HALOPERIDOL 5 MG/ML
5 INJECTION INTRAMUSCULAR EVERY 6 HOURS PRN
Status: DISCONTINUED | OUTPATIENT
Start: 2020-05-21 | End: 2020-05-27 | Stop reason: HOSPADM

## 2020-05-21 RX ORDER — DEXTROSE MONOHYDRATE 50 MG/ML
100 INJECTION, SOLUTION INTRAVENOUS PRN
Status: DISCONTINUED | OUTPATIENT
Start: 2020-05-21 | End: 2020-05-27 | Stop reason: HOSPADM

## 2020-05-21 RX ORDER — CHLORDIAZEPOXIDE HYDROCHLORIDE 25 MG/1
25 CAPSULE, GELATIN COATED ORAL 4 TIMES DAILY
Status: DISCONTINUED | OUTPATIENT
Start: 2020-05-21 | End: 2020-05-23

## 2020-05-21 RX ORDER — MAGNESIUM HYDROXIDE/ALUMINUM HYDROXICE/SIMETHICONE 120; 1200; 1200 MG/30ML; MG/30ML; MG/30ML
30 SUSPENSION ORAL PRN
Status: DISCONTINUED | OUTPATIENT
Start: 2020-05-21 | End: 2020-05-27 | Stop reason: HOSPADM

## 2020-05-21 RX ORDER — DEXTROSE MONOHYDRATE 25 G/50ML
12.5 INJECTION, SOLUTION INTRAVENOUS PRN
Status: DISCONTINUED | OUTPATIENT
Start: 2020-05-21 | End: 2020-05-27 | Stop reason: HOSPADM

## 2020-05-21 RX ORDER — LORAZEPAM 1 MG/1
1 TABLET ORAL ONCE
Status: COMPLETED | OUTPATIENT
Start: 2020-05-21 | End: 2020-05-21

## 2020-05-21 RX ORDER — THIAMINE MONONITRATE (VIT B1) 100 MG
100 TABLET ORAL DAILY
Status: DISCONTINUED | OUTPATIENT
Start: 2020-05-21 | End: 2020-05-27 | Stop reason: HOSPADM

## 2020-05-21 RX ORDER — NICOTINE POLACRILEX 4 MG
15 LOZENGE BUCCAL PRN
Status: DISCONTINUED | OUTPATIENT
Start: 2020-05-21 | End: 2020-05-27 | Stop reason: HOSPADM

## 2020-05-21 RX ORDER — INSULIN GLARGINE 100 [IU]/ML
35 INJECTION, SOLUTION SUBCUTANEOUS NIGHTLY
Status: DISCONTINUED | OUTPATIENT
Start: 2020-05-21 | End: 2020-05-22

## 2020-05-21 RX ORDER — FOLIC ACID 1 MG/1
1 TABLET ORAL DAILY
Status: DISCONTINUED | OUTPATIENT
Start: 2020-05-21 | End: 2020-05-27 | Stop reason: HOSPADM

## 2020-05-21 RX ORDER — DIVALPROEX SODIUM 250 MG/1
250 TABLET, DELAYED RELEASE ORAL EVERY 12 HOURS SCHEDULED
Status: DISCONTINUED | OUTPATIENT
Start: 2020-05-21 | End: 2020-05-25

## 2020-05-21 RX ADMIN — TRAZODONE HYDROCHLORIDE 50 MG: 50 TABLET ORAL at 21:08

## 2020-05-21 RX ADMIN — HYDROXYZINE PAMOATE 50 MG: 50 CAPSULE ORAL at 21:08

## 2020-05-21 RX ADMIN — Medication 100 MG: at 21:08

## 2020-05-21 RX ADMIN — DIVALPROEX SODIUM 250 MG: 250 TABLET, DELAYED RELEASE ORAL at 21:08

## 2020-05-21 RX ADMIN — INSULIN GLARGINE 35 UNITS: 100 INJECTION, SOLUTION SUBCUTANEOUS at 21:07

## 2020-05-21 RX ADMIN — FOLIC ACID 1 MG: 1 TABLET ORAL at 21:08

## 2020-05-21 RX ADMIN — LORAZEPAM 1 MG: 1 TABLET ORAL at 11:56

## 2020-05-21 RX ADMIN — INSULIN LISPRO 5 UNITS: 100 INJECTION, SOLUTION INTRAVENOUS; SUBCUTANEOUS at 21:07

## 2020-05-21 RX ADMIN — METFORMIN HYDROCHLORIDE 1000 MG: 1000 TABLET, FILM COATED ORAL at 21:08

## 2020-05-21 RX ADMIN — CHLORDIAZEPOXIDE HYDROCHLORIDE 25 MG: 25 CAPSULE ORAL at 21:08

## 2020-05-21 ASSESSMENT — SLEEP AND FATIGUE QUESTIONNAIRES
DO YOU USE A SLEEP AID: NO
AVERAGE NUMBER OF SLEEP HOURS: 4
RESTFUL SLEEP: NO
SLEEP PATTERN: DIFFICULTY FALLING ASLEEP;RESTLESSNESS
DO YOU HAVE DIFFICULTY SLEEPING: YES
DIFFICULTY ARISING: NO
DIFFICULTY STAYING ASLEEP: YES
DIFFICULTY FALLING ASLEEP: YES

## 2020-05-21 ASSESSMENT — PAIN - FUNCTIONAL ASSESSMENT: PAIN_FUNCTIONAL_ASSESSMENT: 0-10

## 2020-05-21 ASSESSMENT — LIFESTYLE VARIABLES: HISTORY_ALCOHOL_USE: YES

## 2020-05-21 NOTE — BH NOTE
Pt is stable and alert. Pt admission was completed without incident. Pt denies suicidal or homicidal ideations. Pt denies hallucinations at this time. Pt denies alcohol withdrawal symptoms presently. Pt last used alcohol and cocaine yesterday. Pt is cooperative and alert to surroundings x 4. Pt denies discomforts.
Treatment Center                                           ( ) Patient refused counseling  ( ) Patient has not smoked in the last 30 days    Metabolic Screening:    Lab Results   Component Value Date    LABA1C 12.2 (H) 04/05/2020       Lab Results   Component Value Date    CHOL 173 11/05/2014    CHOL 184 05/01/2014    CHOL 156 10/14/2013    CHOL 120 11/22/2010     Lab Results   Component Value Date    TRIG 93 11/05/2014    TRIG 245 (H) 05/01/2014    TRIG 302 (H) 10/14/2013    TRIG 84 11/22/2010     Lab Results   Component Value Date    HDL 44 11/05/2014    HDL 60 05/01/2014    HDL 58.0 10/14/2013    HDL 34.0 (A) 11/22/2010     No components found for: LDLCAL  Lab Results   Component Value Date    LABVLDL 19 11/05/2014    LABVLDL 49 05/01/2014         Body mass index is 25.85 kg/m². BP Readings from Last 2 Encounters:   05/21/20 119/71   04/08/20 118/72           Pt admitted with followings belongings:        Valuables sent home with. Valuables placed in safe in security envelope, number:  n/a. Patient's home medications were . Patient oriented to surroundings and program expectations and copy of patient rights given. Received admission packet: yes. Consents reviewed, signed: yes. Refused: no. Patient verbalize understanding: yes. Patient education on precautions: yes.                    Pooja Mata RN

## 2020-05-21 NOTE — ED PROVIDER NOTES
ED Attending  CC: Bianca     Department of Emergency Medicine   ED  Provider Note  Admit Date/RoomTime: 5/21/2020  9:31 AM  ED Room: [address] [place] [address]  Chief Complaint   Psychiatric Evaluation (states he is on the verge of a \"mental break\", +SI -HI denies hallucinations)    History of Present Illness   Source of history provided by:  patient. History/Exam Limitations: none. Jo-Ann Lorenz is a 54 y.o. old male who has a past medical history of:   Past Medical History:   Diagnosis Date    [facility] ([facility]     HIV (human immunodeficiency virus infection) ([facility]     Hypertension     Neuropathy     presents to the emergency department by private vehicle, for psychiatric evaluation/medical clearance which began 1 month(s) prior to arrival.  He has a prior history of anxiety, depression, illegal drug usage and mood swings of which the problem is long-standing. Patient states that he was seen here last month and evaluated and placed on medication but \"never picked it up and has not taken anything since discharge because he did not have a ride. \" His reported drug use history: Current alcohol, marijuana and cocaine. He  has previously been in counseling. There has been no history of recent trauma. He admits to suicidal ideation  and denies homicidal ideation. Quality:      Hopelessness:   Yes. Terror:   No.     Confusion:   No.     Hallucinations:   auditory. Able to care for self: No.  Able to control self: No.    ROS    Pertinent positives and negatives are stated within HPI, all other systems reviewed and are negative. Past Surgical History:  has no past surgical history on file. Social History:  reports that he has quit smoking. His smoking use included cigarettes. He has a 40.00 pack-year smoking history. He has never used smokeless tobacco. He reports current alcohol use. He reports current drug use. Drugs: Marijuana and Cocaine. Family History: family history is not on file.   Allergies: Bee DETECTED Negative < 25 ng/mL    Methadone Screen, Urine NOT DETECTED Negative <300 ng/mL    Oxycodone Urine NOT DETECTED Negative <100 ng/mL    FENTANYL SCREEN, URINE NOT DETECTED Negative <1 ng/mL    Drug Screen Comment: see below    Urinalysis   Result Value Ref Range    Color, UA Yellow Straw/Yellow    Clarity, UA Clear Clear    Glucose, Ur >=1000 (A) Negative mg/dL    Bilirubin Urine Negative Negative    Ketones, Urine Negative Negative mg/dL    Specific Gravity, UA 1.020 1.005 - 1.030    Blood, Urine TRACE-INTACT Negative    pH, UA 5.5 5.0 - 9.0    Protein, UA Negative Negative mg/dL    Urobilinogen, Urine 0.2 <2.0 E.U./dL    Nitrite, Urine Negative Negative    Leukocyte Esterase, Urine Negative Negative   Microscopic Urinalysis   Result Value Ref Range    WBC, UA NONE 0 - 5 /HPF    RBC, UA NONE 0 - 2 /HPF    Bacteria, UA RARE (A) None Seen /HPF     Imaging: All Radiology results interpreted by Radiologist unless otherwise noted. No orders to display       ED Course / Medical Decision Making     Medications   LORazepam (ATIVAN) tablet 1 mg (has no administration in time range)       Consult(s):   IP CONSULT TO SOCIAL WORK    Procedure(s):   none    MDM:   Patient is a 59-year-old male that presented to the emergency department for psychiatric evaluation due to suicidal ideations. Patient had a full psychiatric evaluation including lab work and social work consultation. Patient was pink slipped. Ativan was given. Patient is comfortably sleeping in bed and denies any other complaints. Patient is medically cleared for psychiatric evaluation. Counseling: The emergency provider has spoken with the patient and discussed todays results, in addition to providing specific details for the plan of care and counseling regarding the diagnosis and prognosis. Questions are answered at this time and they are agreeable with the plan. Assessment      1.  Suicidal ideations      Plan   Admit to mental health unit - medically cleared for admission  Patient condition is stable    New Medications     New Prescriptions    No medications on file     Electronically signed by Ramu Roach PA-C   DD: 5/21/20  **This report was transcribed using voice recognition software. Every effort was made to ensure accuracy; however, inadvertent computerized transcription errors may be present.   END OF ED PROVIDER NOTE        Ramu Roach PA-C  05/21/20 7828 [facility]  05/21/20 9104

## 2020-05-22 PROBLEM — F19.959 SUBSTANCE-INDUCED PSYCHOTIC DISORDER (HCC): Status: ACTIVE | Noted: 2020-05-22

## 2020-05-22 LAB
ALBUMIN SERPL-MCNC: 4.5 G/DL (ref 3.5–5.2)
ALP BLD-CCNC: 69 U/L (ref 40–129)
ALT SERPL-CCNC: 41 U/L (ref 0–40)
ANION GAP SERPL CALCULATED.3IONS-SCNC: 16 MMOL/L (ref 7–16)
AST SERPL-CCNC: 32 U/L (ref 0–39)
BILIRUB SERPL-MCNC: 4.1 MG/DL (ref 0–1.2)
BILIRUBIN DIRECT: 0.3 MG/DL (ref 0–0.3)
BILIRUBIN, INDIRECT: 3.8 MG/DL (ref 0–1)
BUN BLDV-MCNC: 16 MG/DL (ref 6–20)
CALCIUM SERPL-MCNC: 9.9 MG/DL (ref 8.6–10.2)
CHLORIDE BLD-SCNC: 99 MMOL/L (ref 98–107)
CHOLESTEROL, TOTAL: 233 MG/DL (ref 0–199)
CO2: 25 MMOL/L (ref 22–29)
CREAT SERPL-MCNC: 1 MG/DL (ref 0.7–1.2)
EKG ATRIAL RATE: 71 BPM
EKG P AXIS: 67 DEGREES
EKG P-R INTERVAL: 118 MS
EKG Q-T INTERVAL: 410 MS
EKG QRS DURATION: 116 MS
EKG QTC CALCULATION (BAZETT): 445 MS
EKG R AXIS: 67 DEGREES
EKG T AXIS: 53 DEGREES
EKG VENTRICULAR RATE: 71 BPM
GFR AFRICAN AMERICAN: >60
GFR NON-AFRICAN AMERICAN: >60 ML/MIN/1.73
GLUCOSE BLD-MCNC: 59 MG/DL (ref 74–99)
HBA1C MFR BLD: 11.4 % (ref 4–5.6)
HDLC SERPL-MCNC: 115 MG/DL
INR BLD: 0.8
LDL CHOLESTEROL CALCULATED: 89 MG/DL (ref 0–99)
METER GLUCOSE: 110 MG/DL (ref 74–99)
METER GLUCOSE: 225 MG/DL (ref 74–99)
METER GLUCOSE: 247 MG/DL (ref 74–99)
METER GLUCOSE: 268 MG/DL (ref 74–99)
METER GLUCOSE: 77 MG/DL (ref 74–99)
POTASSIUM SERPL-SCNC: 3.7 MMOL/L (ref 3.5–5)
PROTHROMBIN TIME: 9.1 SEC (ref 9.3–12.4)
SODIUM BLD-SCNC: 140 MMOL/L (ref 132–146)
TOTAL PROTEIN: 7.7 G/DL (ref 6.4–8.3)
TRIGL SERPL-MCNC: 144 MG/DL (ref 0–149)
TROPONIN: <0.01 NG/ML (ref 0–0.03)
VLDLC SERPL CALC-MCNC: 29 MG/DL

## 2020-05-22 PROCEDURE — 6370000000 HC RX 637 (ALT 250 FOR IP): Performed by: PSYCHIATRY & NEUROLOGY

## 2020-05-22 PROCEDURE — 6370000000 HC RX 637 (ALT 250 FOR IP): Performed by: NURSE PRACTITIONER

## 2020-05-22 PROCEDURE — 6370000000 HC RX 637 (ALT 250 FOR IP): Performed by: INTERNAL MEDICINE

## 2020-05-22 PROCEDURE — 99222 1ST HOSP IP/OBS MODERATE 55: CPT | Performed by: NURSE PRACTITIONER

## 2020-05-22 PROCEDURE — 84484 ASSAY OF TROPONIN QUANT: CPT

## 2020-05-22 PROCEDURE — 80048 BASIC METABOLIC PNL TOTAL CA: CPT

## 2020-05-22 PROCEDURE — 93010 ELECTROCARDIOGRAM REPORT: CPT | Performed by: INTERNAL MEDICINE

## 2020-05-22 PROCEDURE — 1240000000 HC EMOTIONAL WELLNESS R&B

## 2020-05-22 PROCEDURE — 83036 HEMOGLOBIN GLYCOSYLATED A1C: CPT

## 2020-05-22 PROCEDURE — 80076 HEPATIC FUNCTION PANEL: CPT

## 2020-05-22 PROCEDURE — 85610 PROTHROMBIN TIME: CPT

## 2020-05-22 PROCEDURE — 80061 LIPID PANEL: CPT

## 2020-05-22 PROCEDURE — 36415 COLL VENOUS BLD VENIPUNCTURE: CPT

## 2020-05-22 PROCEDURE — 82962 GLUCOSE BLOOD TEST: CPT

## 2020-05-22 RX ORDER — INSULIN GLARGINE 100 [IU]/ML
30 INJECTION, SOLUTION SUBCUTANEOUS NIGHTLY
Status: DISCONTINUED | OUTPATIENT
Start: 2020-05-22 | End: 2020-05-23

## 2020-05-22 RX ORDER — RITONAVIR 100 MG/1
100 TABLET ORAL DAILY
Status: DISCONTINUED | OUTPATIENT
Start: 2020-05-22 | End: 2020-05-27 | Stop reason: HOSPADM

## 2020-05-22 RX ORDER — ATAZANAVIR 300 MG/1
300 CAPSULE ORAL DAILY
Status: DISCONTINUED | OUTPATIENT
Start: 2020-05-22 | End: 2020-05-27 | Stop reason: HOSPADM

## 2020-05-22 RX ADMIN — RITONAVIR 100 MG: 100 TABLET ORAL at 16:46

## 2020-05-22 RX ADMIN — LISINOPRIL 40 MG: 20 TABLET ORAL at 09:30

## 2020-05-22 RX ADMIN — DIVALPROEX SODIUM 250 MG: 250 TABLET, DELAYED RELEASE ORAL at 08:54

## 2020-05-22 RX ADMIN — METFORMIN HYDROCHLORIDE 1000 MG: 1000 TABLET, FILM COATED ORAL at 08:53

## 2020-05-22 RX ADMIN — METFORMIN HYDROCHLORIDE 1000 MG: 1000 TABLET, FILM COATED ORAL at 16:44

## 2020-05-22 RX ADMIN — INSULIN LISPRO 1 UNITS: 100 INJECTION, SOLUTION INTRAVENOUS; SUBCUTANEOUS at 21:00

## 2020-05-22 RX ADMIN — CHLORDIAZEPOXIDE HYDROCHLORIDE 25 MG: 25 CAPSULE ORAL at 12:57

## 2020-05-22 RX ADMIN — Medication 100 MG: at 08:53

## 2020-05-22 RX ADMIN — CHLORDIAZEPOXIDE HYDROCHLORIDE 25 MG: 25 CAPSULE ORAL at 16:44

## 2020-05-22 RX ADMIN — DIVALPROEX SODIUM 250 MG: 250 TABLET, DELAYED RELEASE ORAL at 20:59

## 2020-05-22 RX ADMIN — CHLORDIAZEPOXIDE HYDROCHLORIDE 25 MG: 25 CAPSULE ORAL at 20:59

## 2020-05-22 RX ADMIN — INSULIN LISPRO 3 UNITS: 100 INJECTION, SOLUTION INTRAVENOUS; SUBCUTANEOUS at 16:49

## 2020-05-22 RX ADMIN — EMTRICITABINE AND TENOFOVIR ALAFENAMIDE 1 TABLET: 200; 25 TABLET ORAL at 16:46

## 2020-05-22 RX ADMIN — CHLORDIAZEPOXIDE HYDROCHLORIDE 25 MG: 25 CAPSULE ORAL at 08:54

## 2020-05-22 RX ADMIN — INSULIN LISPRO 2 UNITS: 100 INJECTION, SOLUTION INTRAVENOUS; SUBCUTANEOUS at 12:52

## 2020-05-22 RX ADMIN — ATAZANAVIR 300 MG: 300 CAPSULE, GELATIN COATED ORAL at 16:45

## 2020-05-22 RX ADMIN — TRAZODONE HYDROCHLORIDE 50 MG: 50 TABLET ORAL at 20:59

## 2020-05-22 RX ADMIN — FOLIC ACID 1 MG: 1 TABLET ORAL at 08:54

## 2020-05-22 RX ADMIN — INSULIN GLARGINE 30 UNITS: 100 INJECTION, SOLUTION SUBCUTANEOUS at 21:00

## 2020-05-22 ASSESSMENT — LIFESTYLE VARIABLES: HISTORY_ALCOHOL_USE: YES

## 2020-05-22 ASSESSMENT — SLEEP AND FATIGUE QUESTIONNAIRES
DIFFICULTY ARISING: NO
AVERAGE NUMBER OF SLEEP HOURS: 4
DIFFICULTY STAYING ASLEEP: YES
RESTFUL SLEEP: NO
SLEEP PATTERN: DIFFICULTY FALLING ASLEEP;DISTURBED/INTERRUPTED SLEEP
DO YOU HAVE DIFFICULTY SLEEPING: YES
DIFFICULTY FALLING ASLEEP: YES
DO YOU USE A SLEEP AID: NO

## 2020-05-22 ASSESSMENT — PAIN SCALES - GENERAL
PAINLEVEL_OUTOF10: 0
PAINLEVEL_OUTOF10: 0

## 2020-05-22 ASSESSMENT — PATIENT HEALTH QUESTIONNAIRE - PHQ9: SUM OF ALL RESPONSES TO PHQ QUESTIONS 1-9: 8

## 2020-05-22 NOTE — PROGRESS NOTES
[facility]  Internal Medicine Residency Program  Progress Note - House Team     Patient:  Dave Castañeda 54 y.o. male MRN: [id number]     Date of Service: 5/22/2020     CC: auditory/visual hallucinations  Overnight events: hypoglycemia episode     Subjective     Patient seen and examined at bedside, was hypoglycemic this morning but refers no symptoms( lightheaded, dizzy, tremors, palpitations, diaphoresis) refers he is feeling better and thinks his therapy here will be good for him. Objective     Physical Exam:  · Vitals: /62   Pulse 91   Temp 97.3 °F (36.3 °C)   Resp 14   Ht 5' 8\" (1.727 m)   Wt 170 lb (77.1 kg)   SpO2 99%   BMI 25.85 kg/m²     · I & O - 24hr: No intake/output data recorded. · General Appearance: alert, appears stated age and cooperative  · HEENT:  Head: Normocephalic, no lesions, without obvious abnormality. · Neck: no JVD, supple, symmetrical, trachea midline and thyroid not enlarged, symmetric, no tenderness/mass/nodules  · Lung: clear to auscultation bilaterally  · Heart: regular rate and rhythm, S1, S2 normal, no murmur, click, rub or gallop  · Abdomen: soft, non-tender; bowel sounds normal; no masses,  no organomegaly  · Extremities:  extremities normal, atraumatic, no cyanosis or edema  · Musculokeletal: No joint swelling, no muscle tenderness. ROM normal in all joints of extremities.    · Neurologic: Mental status: Alert, oriented, thought content appropriate  Subject  Pertinent Labs & Imaging Studies   leeanna  CBC with Differential:    Lab Results   Component Value Date    WBC 7.7 05/21/2020    RBC 4.66 05/21/2020    HGB 13.9 05/21/2020    HCT 41.7 05/21/2020     05/21/2020    MCV 89.5 05/21/2020    MCH 29.8 05/21/2020    MCHC 33.3 05/21/2020    RDW 14.1 05/21/2020    SEGSPCT 46 01/21/2014    LYMPHOPCT 31.6 05/21/2020    MONOPCT 11.8 05/21/2020    BASOPCT 0.8 05/21/2020    MONOSABS 0.91 05/21/2020    LYMPHSABS 2.44 05/21/2020    EOSABS 0.11

## 2020-05-22 NOTE — GROUP NOTE
Group Therapy Note    Date: 5/22/2020    Group Start Time: 0200  Group End Time: 0230  Group Topic: Recovery    [facility]    FRANCISCO Valente LSW        Group Therapy Note    Attendees: 13         Patient's Goal:  Processing Congintive Distortions    Notes:  Patient struggles with Jumping to Conclusion    Status After Intervention:  Improved    Participation Level:  Active Listener and Interactive    Participation Quality: Appropriate, Attentive and Sharing      Speech:  normal      Thought Process/Content: Logical      Affective Functioning: Congruent      Mood: euthymic      Level of consciousness:  Alert and Oriented x4      Response to Learning: Progressing to goal      Endings: None Reported    Modes of Intervention: Education, Support and Socialization      Discipline Responsible: /Counselor      Signature:  FRANCISCO Valente LSW

## 2020-05-22 NOTE — PROGRESS NOTES
Lab notified of now troponin order they report initial order was canceled by Dr Delgado Burton reordered and timed per lab protocol

## 2020-05-22 NOTE — H&P
Department of Psychiatry  History and Physical - Adult     CHIEF COMPLAINT:    \" I started feeling suicidal and need to go to rehab. \"    Patient was seen after discussing with the treatment team and reviewing the chart    CIRCUMSTANCES OF ADMISSION: Presented to the ED complaining of suicidal ideations and auditory and visual hallucinations    HISTORY OF PRESENT ILLNESS:      The patient is a 54 y.o. male with significant past history of diabetes mellitus, HIV, alcohol dependence, cocaine abuse and major depressive disorder presents the ED reporting that he never took the medications he was on after his last discharge that he was having suicidal ideations and auditory and visual hallucinations. his urine drug screen is positive for cocaine his blood alcohol is negative he was medically cleared and admitted to [address] [place] [address] for further psychiatric assessment stabilization and treatment. Upon assessment today patient reports that upon his discharge here in April he never got his medications from the pharmacy and that he began to drink and use drugs again. He states he feels guilty about this. He has been having suicidal thoughts and knows that he needs to get help. He report significant neurovegetative symptoms of depression including poor sleep and appetite increasing feelings of hopelessness and worthlessness and guilt. His affect is very flat and blunted. He is not offering much conversation. He states that he wants to get help with his drug and alcohol use.       Past Psychiatric History: Patient is most recently hospitalized at [facility] in April 2020. Also has a history of being hospitalized at [facility] psychiatric unit and [facility] in the past.  He was on Celexa after his last admission at [facility]. He denied suicide attempts.  He has followed up with [facility] and [facility] in the past.      Substance Abuse History: Patient reports heavy alcohol distention   adenopathy   Chest: x Clear   Rhonchi     Wheezing   CV:  xS1   xS2    xNo murmer   Abdomen:  x  Soft    Tender    Viceromegaly   Extremities:  x No Edema     Edema     Cranial Nerves Examination:   CN II:   xPupils are reactive to light  Pupils are non reactive to light  CN III, IV, VI:  xNo eye deviation    No diplopia or ptosis   CN V:    xFacial Sensation is intact     Facial Sensation is not intact   CN IIIV:   x Hearing is normal to rubbing fingers   CN IX, X:     xNormal gag reflex and phonation   CN XI:   xShoulder shrug and neck rotation is normal  CNXII:    xTongue is midline no deviation or atrophy    Mental Status Examination:    Level of consciousness:  within normal limits   Appearance:  well-appearing  Behavior/Motor:  no abnormalities noted  Attitude toward examiner:  cooperative  Speech:  spontaneous, normal rate and normal volume   Mood:  \"Im depressed\"  Affect:  blunted and flat  Thought processes:  linear   Thought content: Vague auditory and visual hallucinations  Cognition:  oriented to person, place, and time   Concentration intact  Memory intact  Insight fair   Judgement fair   Fund of Knowledge adequate      DIAGNOSIS:  MDD recurrent moderate  Cocaine abuse  Alcohol dependence  Substance-induced psychosis          LABS: REVIEWED TODAY:  Recent Labs     05/21/20  1006   WBC 7.7   HGB 13.9        Recent Labs     05/21/20  1006 05/22/20  0711    140   K 4.0 3.7    99   CO2 21* 25   BUN 20 16   CREATININE 0.9 1.0   GLUCOSE 245* 59*     Recent Labs     05/21/20  1006 05/22/20  0711   BILITOT 2.4* 4.1*   ALKPHOS 64 69   AST 34 32   ALT 38 41*     Lab Results   Component Value Date    LABAMPH NOT DETECTED 05/21/2020    BARBSCNU NOT DETECTED 05/21/2020    LABBENZ NOT DETECTED 05/21/2020    LABMETH NOT DETECTED 05/21/2020    OPIATESCREENURINE NOT DETECTED 05/21/2020    PHENCYCLIDINESCREENURINE NOT DETECTED 05/21/2020    ETOH <10 05/21/2020     Lab Results   Component

## 2020-05-22 NOTE — PROGRESS NOTES
Newly admitted resting in bed with eyes closed House Team 1 consulted for diabetic mng support given

## 2020-05-22 NOTE — CONSULTS
Alcohol Swabs PADS 1 strip by Does not apply route 4 times daily (after meals and at bedtime) 4/7/20   Kiara Gonzalez MD   citalopram (CELEXA) 20 MG tablet Take 1 tablet by mouth daily for 14 days 4/8/20 8/75/20  NICHOLAS Cunningham CNP   folic acid (FOLVITE) 1 MG tablet Take 1 tablet by mouth daily for 14 days 4/8/20 0/37/42  NICHOLAS Lopez CNP   melatonin 3 MG TABS tablet Take 1 tablet by mouth nightly as needed (sleep) 4/7/20 4/52/85  NICHOLAS Cunninhgam CNP   Multiple Vitamin (MULTIVITAMIN) tablet Take 1 tablet by mouth daily for 14 days 4/8/20 1/40/66  NICHOLAS Lopez CNP   vitamin B-1 100 MG tablet Take 1 tablet by mouth daily 3/0/03   NICHOLAS Cunningham CNP   emtricitabine-tenofovir alafenamide (DESCOVY) 200-25 MG TABS tablet Take 1 tablet by mouth daily    Historical Provider, MD   Atazanavir-Cobicistat (EVOTAZ) 300-150 MG TABS Take 1 tablet by mouth Daily    Historical Provider, MD   metFORMIN (GLUCOPHAGE) 1000 MG tablet Take 1 tablet by mouth 2 times daily (with meals) 4/6/20   Kiara Gonzalez MD   insulin lispro (HUMALOG) 100 UNIT/ML injection vial Inject 0-12 Units into the skin 3 times daily (with meals) 4/6/20   Kiara Gonzalez MD   glucose monitoring kit (FREESTYLE) monitoring kit Check blood sugar once daily fasting.  May substitute brand for insurance coverage 4/6/20   Kiara Gonzalez MD   Lancets MISC 1 each by Does not apply route 4 times daily 4/6/20   Kiara Gonzalez MD   Insulin Syringe-Needle U-100 25G X 1\" 1 ML MISC 1 each by Does not apply route 4 times daily (after meals and at bedtime) Use as directed May substitute for insurance coverage 4/6/20   Kiara Gonzalez MD   albuterol sulfate HFA (PROAIR HFA) 108 (90 Base) MCG/ACT inhaler Inhale 2 puffs into the lungs every 6 hours as needed for Wheezing 5/30/19 4/4/20  Celio U Jhonny, DO   Respiratory Therapy Supplies (NEBULIZER/TUBING/MOUTHPIECE) KIT 1 kit by Does not apply route daily as needed

## 2020-05-23 PROBLEM — R94.31 NONSPECIFIC ST-T WAVE ELECTROCARDIOGRAPHIC CHANGES: Status: ACTIVE | Noted: 2020-05-23

## 2020-05-23 LAB
METER GLUCOSE: 123 MG/DL (ref 74–99)
METER GLUCOSE: 238 MG/DL (ref 74–99)
METER GLUCOSE: 321 MG/DL (ref 74–99)
METER GLUCOSE: 354 MG/DL (ref 74–99)

## 2020-05-23 PROCEDURE — 36415 COLL VENOUS BLD VENIPUNCTURE: CPT

## 2020-05-23 PROCEDURE — 6370000000 HC RX 637 (ALT 250 FOR IP): Performed by: INTERNAL MEDICINE

## 2020-05-23 PROCEDURE — 6370000000 HC RX 637 (ALT 250 FOR IP): Performed by: PSYCHIATRY & NEUROLOGY

## 2020-05-23 PROCEDURE — 1240000000 HC EMOTIONAL WELLNESS R&B

## 2020-05-23 PROCEDURE — 86592 SYPHILIS TEST NON-TREP QUAL: CPT

## 2020-05-23 PROCEDURE — 99232 SBSQ HOSP IP/OBS MODERATE 35: CPT | Performed by: NURSE PRACTITIONER

## 2020-05-23 PROCEDURE — 82962 GLUCOSE BLOOD TEST: CPT

## 2020-05-23 PROCEDURE — 80074 ACUTE HEPATITIS PANEL: CPT

## 2020-05-23 PROCEDURE — 6370000000 HC RX 637 (ALT 250 FOR IP): Performed by: NURSE PRACTITIONER

## 2020-05-23 PROCEDURE — 94640 AIRWAY INHALATION TREATMENT: CPT

## 2020-05-23 PROCEDURE — 6360000002 HC RX W HCPCS: Performed by: INTERNAL MEDICINE

## 2020-05-23 PROCEDURE — 99231 SBSQ HOSP IP/OBS SF/LOW 25: CPT | Performed by: INTERNAL MEDICINE

## 2020-05-23 RX ORDER — MONTELUKAST SODIUM 10 MG/1
10 TABLET ORAL NIGHTLY
Status: DISCONTINUED | OUTPATIENT
Start: 2020-05-23 | End: 2020-05-27 | Stop reason: HOSPADM

## 2020-05-23 RX ORDER — BUDESONIDE 0.5 MG/2ML
1 INHALANT ORAL 2 TIMES DAILY
Status: DISCONTINUED | OUTPATIENT
Start: 2020-05-23 | End: 2020-05-27 | Stop reason: HOSPADM

## 2020-05-23 RX ORDER — IPRATROPIUM BROMIDE AND ALBUTEROL SULFATE 2.5; .5 MG/3ML; MG/3ML
1 SOLUTION RESPIRATORY (INHALATION)
Status: DISCONTINUED | OUTPATIENT
Start: 2020-05-23 | End: 2020-05-27

## 2020-05-23 RX ORDER — GUAIFENESIN 400 MG/1
400 TABLET ORAL 3 TIMES DAILY
Status: DISCONTINUED | OUTPATIENT
Start: 2020-05-23 | End: 2020-05-27 | Stop reason: HOSPADM

## 2020-05-23 RX ORDER — CHLORDIAZEPOXIDE HYDROCHLORIDE 25 MG/1
25 CAPSULE, GELATIN COATED ORAL EVERY 8 HOURS
Status: DISCONTINUED | OUTPATIENT
Start: 2020-05-23 | End: 2020-05-24

## 2020-05-23 RX ORDER — NALTREXONE HYDROCHLORIDE 50 MG/1
50 TABLET, FILM COATED ORAL
Status: DISCONTINUED | OUTPATIENT
Start: 2020-05-24 | End: 2020-05-27 | Stop reason: HOSPADM

## 2020-05-23 RX ORDER — LIDOCAINE 4 G/G
1 PATCH TOPICAL DAILY
Status: DISCONTINUED | OUTPATIENT
Start: 2020-05-23 | End: 2020-05-23

## 2020-05-23 RX ORDER — ANALGESIC BALM 1.74; 4.06 G/29G; G/29G
OINTMENT TOPICAL ONCE
Status: COMPLETED | OUTPATIENT
Start: 2020-05-23 | End: 2020-05-23

## 2020-05-23 RX ORDER — INSULIN GLARGINE 100 [IU]/ML
32 INJECTION, SOLUTION SUBCUTANEOUS NIGHTLY
Status: DISCONTINUED | OUTPATIENT
Start: 2020-05-23 | End: 2020-05-27

## 2020-05-23 RX ORDER — CITALOPRAM 20 MG/1
10 TABLET ORAL DAILY
Status: DISCONTINUED | OUTPATIENT
Start: 2020-05-23 | End: 2020-05-25

## 2020-05-23 RX ADMIN — METFORMIN HYDROCHLORIDE 1000 MG: 1000 TABLET, FILM COATED ORAL at 08:47

## 2020-05-23 RX ADMIN — DIVALPROEX SODIUM 250 MG: 250 TABLET, DELAYED RELEASE ORAL at 08:47

## 2020-05-23 RX ADMIN — RITONAVIR 100 MG: 100 TABLET ORAL at 08:48

## 2020-05-23 RX ADMIN — DIVALPROEX SODIUM 250 MG: 250 TABLET, DELAYED RELEASE ORAL at 21:07

## 2020-05-23 RX ADMIN — LISINOPRIL 40 MG: 20 TABLET ORAL at 08:47

## 2020-05-23 RX ADMIN — ATAZANAVIR 300 MG: 300 CAPSULE, GELATIN COATED ORAL at 08:47

## 2020-05-23 RX ADMIN — INSULIN LISPRO 2 UNITS: 100 INJECTION, SOLUTION INTRAVENOUS; SUBCUTANEOUS at 21:08

## 2020-05-23 RX ADMIN — INSULIN LISPRO 2 UNITS: 100 INJECTION, SOLUTION INTRAVENOUS; SUBCUTANEOUS at 08:50

## 2020-05-23 RX ADMIN — IPRATROPIUM BROMIDE AND ALBUTEROL SULFATE 1 AMPULE: .5; 3 SOLUTION RESPIRATORY (INHALATION) at 21:35

## 2020-05-23 RX ADMIN — BUDESONIDE 1000 MCG: 0.5 SUSPENSION RESPIRATORY (INHALATION) at 21:36

## 2020-05-23 RX ADMIN — BUDESONIDE 1000 MCG: 0.5 SUSPENSION RESPIRATORY (INHALATION) at 13:15

## 2020-05-23 RX ADMIN — TRAZODONE HYDROCHLORIDE 50 MG: 50 TABLET ORAL at 21:07

## 2020-05-23 RX ADMIN — CHLORDIAZEPOXIDE HYDROCHLORIDE 25 MG: 25 CAPSULE ORAL at 08:48

## 2020-05-23 RX ADMIN — IPRATROPIUM BROMIDE AND ALBUTEROL SULFATE 1 AMPULE: .5; 3 SOLUTION RESPIRATORY (INHALATION) at 16:38

## 2020-05-23 RX ADMIN — FOLIC ACID 1 MG: 1 TABLET ORAL at 08:48

## 2020-05-23 RX ADMIN — MENTHOL AND METHYL SALICYLATE: 7.6; 29 OINTMENT TOPICAL at 22:00

## 2020-05-23 RX ADMIN — Medication 100 MG: at 08:47

## 2020-05-23 RX ADMIN — METFORMIN HYDROCHLORIDE 1000 MG: 1000 TABLET, FILM COATED ORAL at 16:23

## 2020-05-23 RX ADMIN — GUAIFENESIN 400 MG: 400 TABLET, FILM COATED ORAL at 22:28

## 2020-05-23 RX ADMIN — EMTRICITABINE AND TENOFOVIR ALAFENAMIDE 1 TABLET: 200; 25 TABLET ORAL at 08:48

## 2020-05-23 RX ADMIN — INSULIN GLARGINE 32 UNITS: 100 INJECTION, SOLUTION SUBCUTANEOUS at 21:07

## 2020-05-23 RX ADMIN — CHLORDIAZEPOXIDE HYDROCHLORIDE 25 MG: 25 CAPSULE ORAL at 12:32

## 2020-05-23 RX ADMIN — MONTELUKAST SODIUM 10 MG: 10 TABLET, FILM COATED ORAL at 22:25

## 2020-05-23 RX ADMIN — INSULIN LISPRO 5 UNITS: 100 INJECTION, SOLUTION INTRAVENOUS; SUBCUTANEOUS at 16:23

## 2020-05-23 RX ADMIN — IPRATROPIUM BROMIDE AND ALBUTEROL SULFATE 1 AMPULE: .5; 3 SOLUTION RESPIRATORY (INHALATION) at 13:16

## 2020-05-23 RX ADMIN — CHLORDIAZEPOXIDE HYDROCHLORIDE 25 MG: 25 CAPSULE ORAL at 21:07

## 2020-05-23 NOTE — CARE COORDINATION
Referral faxed to [facility] per pt request. [facility] is pt's first choice. Offered to assist as needed.

## 2020-05-23 NOTE — PROGRESS NOTES
BEHAVIORAL HEALTH FOLLOW-UP NOTE     5/23/2020     Patient was seen and examined in person, Chart reviewed   Patient's case discussed with staff/team    Chief Complaint: \" Want to go to rehab. \"    Interim History: Patient is up only with a bright pleasant affect. He states he wants to go to inpatient rehab. He vehemently denies any suicidal homicidal ideations intent or plan. He is eating well sleeping well there are no neurovegetative signs of depression. He denies any alcohol withdrawals. He denies any auditory visual hallucinations states he is feeling better. He is agreeable to start naltrexone for alcohol cravings. Appetite:   [x] Normal/Unchanged  [] Increased  [] Decreased      Sleep:       [x] Normal/Unchanged  [] Fair       [] Poor              Energy:    [x] Normal/Unchanged  [] Increased  [] Decreased        SI [] Present  [x] Absent    HI  []Present  [x] Absent     Aggression:  [] yes  [x] no    Patient is [x] able  [] unable to CONTRACT FOR SAFETY     PAST MEDICAL/PSYCHIATRIC HISTORY:   Past Medical History:   Diagnosis Date    Diabetes mellitus ([facility]     HIV (human immunodeficiency virus infection) ([facility]     Hypertension     Neuropathy        FAMILY/SOCIAL HISTORY:  Family History   Problem Relation Age of Onset    Diabetes Mother     Prostate Cancer Father      Social History     Socioeconomic History    Marital status:       Spouse name: Not on file    Number of children: 0    Years of education: 8    Highest education level: Not on file   Occupational History     Employer: UNEMPLOYED   Social Needs    Financial resource strain: Not on file    Food insecurity     Worry: Not on file     Inability: Not on file   [employer] needs     Medical: Not on file     Non-medical: Not on file   Tobacco Use    Smoking status: Current Every Day Smoker     Packs/day: 2.00     Years: 20.00     Pack years: 40.00     Types: Cigarettes    Smokeless tobacco: Never Used   Substance

## 2020-05-23 NOTE — PROGRESS NOTES
[facility]  Internal Medicine Residency Program  Consult Progress Note - House Team 1    Patient:  Shelvy Dancer 54 y.o. male MRN: [id number]     Date of Service: 5/23/2020     CC: Medical management  Overnight events: None     Subjective     Patient was seen and examined this AM. He was sitting comfortably. He complains of chest tightness and a little bit shortness of breath. Dry cough. Chronic back pain. Denies headache, dizziness, abd pain, nausea/vomiting, diarrhea or constipation. Denies urinary symptoms. 24 hour interval Hx:  -Increased Lantus to 32 units nightly  -started Duo-neb, pulmicort neb  -started Mucinex   -Lidocaine patch    Objective     Physical Exam:  · Vitals: /82   Pulse 95   Temp 97.2 °F (36.2 °C)   Resp 14   Ht 5' 8\" (1.727 m)   Wt 170 lb (77.1 kg)   SpO2 99%   BMI 25.85 kg/m²     · I & O - 24hr: No intake/output data recorded. · General Appearance: alert, appears stated age and cooperative  · HEENT:  Head: Normal, normocephalic, atraumatic. · Neck: no adenopathy, no carotid bruit, no JVD, supple, symmetrical, trachea midline and thyroid not enlarged, symmetric, no tenderness/mass/nodules  · Lung: clear to auscultation bilaterally and wheezes bibasilar  · Heart: regular rate and rhythm, S1, S2 normal, no murmur, click, rub or gallop  · Abdomen: soft, non-tender; bowel sounds normal; no masses,  no organomegaly  · Extremities:  extremities normal, atraumatic, no cyanosis or edema  · Musculokeletal: No joint swelling, no muscle tenderness. ROM normal in all joints of extremities.    · Neurologic: Mental status: Alert, oriented, thought content appropriate  Subject  Pertinent Labs & Imaging Studies   leeanna  CBC with Differential:    Lab Results   Component Value Date    WBC 7.7 05/21/2020    RBC 4.66 05/21/2020    HGB 13.9 05/21/2020    HCT 41.7 05/21/2020     05/21/2020    MCV 89.5 05/21/2020    MCH 29.8 05/21/2020    MCHC 33.3 05/21/2020    RDW

## 2020-05-23 NOTE — PLAN OF CARE
Problem: Altered Mood, Depressive Behavior:  Goal: Able to verbalize acceptance of life and situations over which he or she has no control  Description: Able to verbalize acceptance of life and situations over which he or she has no control  Outcome: Ongoing  Goal: Able to verbalize and/or display a decrease in depressive symptoms  Description: Able to verbalize and/or display a decrease in depressive symptoms  Outcome: Ongoing     Problem: Depressive Behavior With or Without Suicide Precautions:  Goal: Able to verbalize acceptance of life and situations over which he or she has no control  Description: Able to verbalize acceptance of life and situations over which he or she has no control  Outcome: Ongoing  Goal: Able to verbalize and/or display a decrease in depressive symptoms  Description: Able to verbalize and/or display a decrease in depressive symptoms  Outcome: Ongoing     Problem: Altered Mood, Psychotic Behavior:  Goal: Able to demonstrate trust by eating, participating in treatment and following staff's direction  Description: Able to demonstrate trust by eating, participating in treatment and following staff's direction  Outcome: Ongoing  Goal: Able to verbalize decrease in frequency and intensity of hallucinations  Description: Able to verbalize decrease in frequency and intensity of hallucinations  Outcome: Ongoing  Goal: Able to verbalize reality based thinking  Description: Able to verbalize reality based thinking  Outcome: Ongoing     Problem: Substance Abuse:  Goal: Absence of drug withdrawal signs and symptoms  Description: Absence of drug withdrawal signs and symptoms  Outcome: Ongoing  Goal: Participates in care planning  Description: Participates in care planning  Outcome: Ongoing  Goal: Patient specific goal  Description: Patient specific goal  Outcome: Ongoing

## 2020-05-24 LAB
METER GLUCOSE: 160 MG/DL (ref 74–99)
METER GLUCOSE: 185 MG/DL (ref 74–99)
METER GLUCOSE: 261 MG/DL (ref 74–99)
METER GLUCOSE: 295 MG/DL (ref 74–99)

## 2020-05-24 PROCEDURE — 1240000000 HC EMOTIONAL WELLNESS R&B

## 2020-05-24 PROCEDURE — 6370000000 HC RX 637 (ALT 250 FOR IP): Performed by: PSYCHIATRY & NEUROLOGY

## 2020-05-24 PROCEDURE — 99231 SBSQ HOSP IP/OBS SF/LOW 25: CPT | Performed by: INTERNAL MEDICINE

## 2020-05-24 PROCEDURE — 82962 GLUCOSE BLOOD TEST: CPT

## 2020-05-24 PROCEDURE — 6370000000 HC RX 637 (ALT 250 FOR IP): Performed by: INTERNAL MEDICINE

## 2020-05-24 PROCEDURE — 6370000000 HC RX 637 (ALT 250 FOR IP): Performed by: NURSE PRACTITIONER

## 2020-05-24 PROCEDURE — 99232 SBSQ HOSP IP/OBS MODERATE 35: CPT | Performed by: NURSE PRACTITIONER

## 2020-05-24 PROCEDURE — 6360000002 HC RX W HCPCS: Performed by: INTERNAL MEDICINE

## 2020-05-24 PROCEDURE — 94640 AIRWAY INHALATION TREATMENT: CPT

## 2020-05-24 RX ORDER — CHLORDIAZEPOXIDE HYDROCHLORIDE 25 MG/1
25 CAPSULE, GELATIN COATED ORAL EVERY 8 HOURS PRN
Status: DISCONTINUED | OUTPATIENT
Start: 2020-05-24 | End: 2020-05-27 | Stop reason: HOSPADM

## 2020-05-24 RX ADMIN — INSULIN LISPRO 3 UNITS: 100 INJECTION, SOLUTION INTRAVENOUS; SUBCUTANEOUS at 20:27

## 2020-05-24 RX ADMIN — EMTRICITABINE AND TENOFOVIR ALAFENAMIDE 1 TABLET: 200; 25 TABLET ORAL at 08:58

## 2020-05-24 RX ADMIN — INSULIN GLARGINE 32 UNITS: 100 INJECTION, SOLUTION SUBCUTANEOUS at 20:28

## 2020-05-24 RX ADMIN — GUAIFENESIN 400 MG: 400 TABLET, FILM COATED ORAL at 08:52

## 2020-05-24 RX ADMIN — INSULIN LISPRO 2 UNITS: 100 INJECTION, SOLUTION INTRAVENOUS; SUBCUTANEOUS at 09:00

## 2020-05-24 RX ADMIN — Medication 100 MG: at 08:51

## 2020-05-24 RX ADMIN — LISINOPRIL 40 MG: 20 TABLET ORAL at 08:51

## 2020-05-24 RX ADMIN — METFORMIN HYDROCHLORIDE 1000 MG: 1000 TABLET, FILM COATED ORAL at 16:31

## 2020-05-24 RX ADMIN — ACETAMINOPHEN 650 MG: 325 TABLET, FILM COATED ORAL at 21:30

## 2020-05-24 RX ADMIN — DIVALPROEX SODIUM 250 MG: 250 TABLET, DELAYED RELEASE ORAL at 20:29

## 2020-05-24 RX ADMIN — TRAZODONE HYDROCHLORIDE 50 MG: 50 TABLET ORAL at 20:29

## 2020-05-24 RX ADMIN — NALTREXONE HYDROCHLORIDE 50 MG: 50 TABLET, FILM COATED ORAL at 08:53

## 2020-05-24 RX ADMIN — NICOTINE POLACRILEX 4 MG: 2 GUM, CHEWING BUCCAL at 23:30

## 2020-05-24 RX ADMIN — MONTELUKAST SODIUM 10 MG: 10 TABLET, FILM COATED ORAL at 20:29

## 2020-05-24 RX ADMIN — CITALOPRAM 10 MG: 20 TABLET, FILM COATED ORAL at 08:52

## 2020-05-24 RX ADMIN — FOLIC ACID 1 MG: 1 TABLET ORAL at 08:53

## 2020-05-24 RX ADMIN — CHLORDIAZEPOXIDE HYDROCHLORIDE 25 MG: 25 CAPSULE ORAL at 07:02

## 2020-05-24 RX ADMIN — ATAZANAVIR 300 MG: 300 CAPSULE, GELATIN COATED ORAL at 08:53

## 2020-05-24 RX ADMIN — DIVALPROEX SODIUM 250 MG: 250 TABLET, DELAYED RELEASE ORAL at 08:51

## 2020-05-24 RX ADMIN — METFORMIN HYDROCHLORIDE 1000 MG: 1000 TABLET, FILM COATED ORAL at 08:51

## 2020-05-24 RX ADMIN — RITONAVIR 100 MG: 100 TABLET ORAL at 08:53

## 2020-05-24 RX ADMIN — IPRATROPIUM BROMIDE AND ALBUTEROL SULFATE 1 AMPULE: .5; 3 SOLUTION RESPIRATORY (INHALATION) at 21:05

## 2020-05-24 RX ADMIN — CHLORDIAZEPOXIDE HYDROCHLORIDE 25 MG: 25 CAPSULE ORAL at 12:20

## 2020-05-24 RX ADMIN — IPRATROPIUM BROMIDE AND ALBUTEROL SULFATE 1 AMPULE: .5; 3 SOLUTION RESPIRATORY (INHALATION) at 12:59

## 2020-05-24 RX ADMIN — GUAIFENESIN 400 MG: 400 TABLET, FILM COATED ORAL at 15:02

## 2020-05-24 RX ADMIN — INSULIN LISPRO 2 UNITS: 100 INJECTION, SOLUTION INTRAVENOUS; SUBCUTANEOUS at 12:20

## 2020-05-24 RX ADMIN — BUDESONIDE 1000 MCG: 0.5 SUSPENSION RESPIRATORY (INHALATION) at 21:05

## 2020-05-24 RX ADMIN — INSULIN LISPRO 6 UNITS: 100 INJECTION, SOLUTION INTRAVENOUS; SUBCUTANEOUS at 16:26

## 2020-05-24 RX ADMIN — GUAIFENESIN 400 MG: 400 TABLET, FILM COATED ORAL at 20:29

## 2020-05-24 ASSESSMENT — PAIN DESCRIPTION - LOCATION: LOCATION: BACK

## 2020-05-24 ASSESSMENT — PAIN SCALES - GENERAL
PAINLEVEL_OUTOF10: 8
PAINLEVEL_OUTOF10: 10

## 2020-05-24 ASSESSMENT — PAIN DESCRIPTION - PAIN TYPE: TYPE: ACUTE PAIN

## 2020-05-24 ASSESSMENT — PAIN DESCRIPTION - FREQUENCY: FREQUENCY: CONTINUOUS

## 2020-05-24 NOTE — PROGRESS NOTES
Pt removed lidocaine patch stating his back is achy from leaning and poor posture from angle of phones. Pt requesting icy hot for his back instead. Notified House Team 1. Dr. Christiano Whitmore. See new orders.

## 2020-05-24 NOTE — PLAN OF CARE
Patient presents brightened and reports that he feels much better. Patient reports he wants to go to rehab and get back on track. Patient reports that he is no longer able to go back to his past and needs the rehab. Patient denies SI, HI, and AVH. Patient reports he is not feeling depressed or anxious. Patient insight is improving. Will monitor closely.

## 2020-05-24 NOTE — GROUP NOTE
Group Therapy Note    Date: 5/23/2020    Group Start Time: 2000  Group End Time: 2040  Group Topic: Healthy Living/Wellness    [facility]  [address], RN        Group Therapy Note    Attendees: 11/16

## 2020-05-24 NOTE — PROGRESS NOTES
[address] [place] [address]  Day 3 Interdisciplinary Treatment Plan NOTE    Review Date & Time: 5/24/2020 1408    Patient was in treatment team    Admission Type:   Admission Type: Involuntary    Reason for admission:  Reason for Admission: \"I need help badly\" pr pt. Estimated Length of Stay Update:  2-4 days  Estimated Discharge Date Update: 5/26/2020    PATIENT STRENGTHS:  Patient Strengths Strengths: Motivated, Positive Support  Patient Strengths and Limitations:Limitations: Inappropriate/potentially harmful leisure interests  Addictive Behavior:Addictive Behavior  In the past 3 months, have you felt or has someone told you that you have a problem with:  : None, Excessive Fluid intake  Do you have a history of Chemical Use?: Yes  Do you have a history of Alcohol Use?: Yes  Do you have a history of Street Drug Abuse?: Yes  Histroy of Prescripton Drug Abuse?: No  Medical Problems:  Past Medical History:   Diagnosis Date    [facility]     HIV (human immunodeficiency virus infection) ([facility]     Hypertension     Neuropathy        Risk:  Fall RiskTotal: 73  Roberto Scale Roberto Scale Score: 22  BVC Total: 0  Change in scores no.  Changes to plan of Care  no    Status EXAM:   Status and Exam  Normal: No  Facial Expression: Sad  Affect: Appropriate  Level of Consciousness: Alert  Mood:Normal: No  Mood: Sad, Anxious  Motor Activity:Normal: No  Motor Activity: Decreased  Interview Behavior: Cooperative  Preception: [place] to Person, Orene Ambriz to Time, [place] to Place, [place] to Situation  Attention:Normal: No  Attention: Distractible  Thought Processes: Other(See comment)(becoming more organized)  Thought Content:Normal: Yes  Hallucinations: None  Delusions: No  Memory:Normal: No  Memory: Poor Recent  Insight and Judgment: No  Insight and Judgment: Poor Judgment, Poor Insight  Present Suicidal Ideation: No  Present Homicidal Ideation: No    Daily Assessment Last Entry:   Daily Sleep (WDL): Within Defined

## 2020-05-24 NOTE — CARE COORDINATION
Margaret spoke with pt's father, Zoila Anderson. Zoila Anderson states that he has no concerns for the pt returning to the community after [employer]. Father states his son has no access to weapons.

## 2020-05-24 NOTE — PROGRESS NOTES
Packs/day: 2.00     Years: 20.00     Pack years: 40.00     Types: Cigarettes    Smokeless tobacco: Never Used   Substance and Sexual Activity    Alcohol use: Yes     Alcohol/week: 0.0 standard drinks     Comment: states that he drinks a lot and did so today     Drug use: Yes     Types: Marijuana, Cocaine     Comment: last use 5-20-20    Sexual activity: Not on file   Lifestyle    Physical activity     Days per week: Not on file     Minutes per session: Not on file    Stress: Not on file   Relationships    Social connections     Talks on phone: Not on file     Gets together: Not on file     Attends Hinduism service: Not on file     Active member of club or organization: Not on file     Attends meetings of clubs or organizations: Not on file     Relationship status: Not on file    Intimate partner violence     Fear of current or ex partner: Not on file     Emotionally abused: Not on file     Physically abused: Not on file     Forced sexual activity: Not on file   Other Topics Concern    Not on file   Social History Narrative    Not on file           ROS:  [x] All negative/unchanged except if checked.  Explain positive(checked items) below:  [] Constitutional  [] Eyes  [] Ear/Nose/Mouth/Throat  [] Respiratory  [] CV  [] GI  []   [] Musculoskeletal  [] Skin/Breast  [] Neurological  [] Endocrine  [] Heme/Lymph  [] Allergic/Immunologic    Explanation:     MEDICATIONS:    Current Facility-Administered Medications:     insulin lispro (HUMALOG) injection vial 0-12 Units, 0-12 Units, Subcutaneous, TID , Suraj Ferguson MD    insulin lispro (HUMALOG) injection vial 0-6 Units, 0-6 Units, Subcutaneous, Nightly, Suraj Ferguson MD    montelukast (SINGULAIR) tablet 10 mg, 10 mg, Oral, Nightly, Katharine Geronimo MD, 10 mg at 05/23/20 2225    ipratropium-albuterol (DUONEB) nebulizer solution 1 ampule, 1 ampule, Inhalation, Q4H WA, Katharine Geronimo MD, 1 ampule at 05/23/20 2135    guaiFENesin tablet 400 mg, 400 mg, abuse    Cocaine abuse ([facility]    Alcohol dependence ([facility]    Substance-induced psychotic disorder ([facility]    Nonspecific ST-T wave electrocardiographic changes  Resolved Problems:    * No resolved hospital problems. *      LABS:    Recent Labs     05/21/20  1006   WBC 7.7   HGB 13.9        Recent Labs     05/21/20  1006 05/22/20  0711    140   K 4.0 3.7    99   CO2 21* 25   BUN 20 16   CREATININE 0.9 1.0   GLUCOSE 245* 59*     Recent Labs     05/21/20  1006 05/22/20  0711   BILITOT 2.4* 4.1*   ALKPHOS 64 69   AST 34 32   ALT 38 41*     Lab Results   Component Value Date    LABAMPH NOT DETECTED 05/21/2020    BARBSCNU NOT DETECTED 05/21/2020    LABBENZ NOT DETECTED 05/21/2020    LABMETH NOT DETECTED 05/21/2020    OPIATESCREENURINE NOT DETECTED 05/21/2020    PHENCYCLIDINESCREENURINE NOT DETECTED 05/21/2020    ETOH <10 05/21/2020     Lab Results   Component Value Date    TSH 2.680 04/08/2020     No results found for: LITHIUM  No results found for: VALPROATE, CBMZ    RISK ASSESSMENT: Low risk for suicide    Treatment Plan:  Reviewed current Medications with the patient. Risks, benefits, side effects, drug-to-drug interactions and alternatives to treatment were discussed. Collateral information:   CD evaluation  Encourage patient to attend group and other milieu activities.   Discharge planning discussed with the patient and treatment team.    Celexa 10 mg daily for depression   Naltrexone 50 mg daily for alcohol cravings     Continue Depakote 250 mg twice daily for alcohol withdrawal plan to DC tomorrow  Librium 25 mg every 8 hours as needed for alcohol withdrawal    PSYCHOTHERAPY/COUNSELING:  [x] Therapeutic interview  [x] Supportive  [] CBT  [] Ongoing  [] Other    [x] Patient continues to need, on a daily basis, active treatment furnished directly by or requiring the supervision of inpatient psychiatric personnel      Anticipated Length of stay: 3 to 5 days based on

## 2020-05-25 LAB
METER GLUCOSE: 145 MG/DL (ref 74–99)
METER GLUCOSE: 199 MG/DL (ref 74–99)
METER GLUCOSE: 305 MG/DL (ref 74–99)
METER GLUCOSE: 311 MG/DL (ref 74–99)

## 2020-05-25 PROCEDURE — 6370000000 HC RX 637 (ALT 250 FOR IP): Performed by: INTERNAL MEDICINE

## 2020-05-25 PROCEDURE — 6360000002 HC RX W HCPCS: Performed by: INTERNAL MEDICINE

## 2020-05-25 PROCEDURE — 1240000000 HC EMOTIONAL WELLNESS R&B

## 2020-05-25 PROCEDURE — 82962 GLUCOSE BLOOD TEST: CPT

## 2020-05-25 PROCEDURE — 94640 AIRWAY INHALATION TREATMENT: CPT

## 2020-05-25 PROCEDURE — 6370000000 HC RX 637 (ALT 250 FOR IP): Performed by: PSYCHIATRY & NEUROLOGY

## 2020-05-25 PROCEDURE — 99232 SBSQ HOSP IP/OBS MODERATE 35: CPT | Performed by: NURSE PRACTITIONER

## 2020-05-25 PROCEDURE — 6370000000 HC RX 637 (ALT 250 FOR IP): Performed by: NURSE PRACTITIONER

## 2020-05-25 RX ORDER — CITALOPRAM 20 MG/1
20 TABLET ORAL DAILY
Status: DISCONTINUED | OUTPATIENT
Start: 2020-05-26 | End: 2020-05-27 | Stop reason: HOSPADM

## 2020-05-25 RX ADMIN — ACETAMINOPHEN 650 MG: 325 TABLET, FILM COATED ORAL at 23:44

## 2020-05-25 RX ADMIN — MONTELUKAST SODIUM 10 MG: 10 TABLET, FILM COATED ORAL at 21:16

## 2020-05-25 RX ADMIN — INSULIN GLARGINE 32 UNITS: 100 INJECTION, SOLUTION SUBCUTANEOUS at 21:12

## 2020-05-25 RX ADMIN — EMTRICITABINE AND TENOFOVIR ALAFENAMIDE 1 TABLET: 200; 25 TABLET ORAL at 08:42

## 2020-05-25 RX ADMIN — HYDROXYZINE PAMOATE 50 MG: 50 CAPSULE ORAL at 14:26

## 2020-05-25 RX ADMIN — IPRATROPIUM BROMIDE AND ALBUTEROL SULFATE 1 AMPULE: .5; 3 SOLUTION RESPIRATORY (INHALATION) at 12:22

## 2020-05-25 RX ADMIN — GUAIFENESIN 400 MG: 400 TABLET, FILM COATED ORAL at 21:17

## 2020-05-25 RX ADMIN — TRAZODONE HYDROCHLORIDE 50 MG: 50 TABLET ORAL at 21:17

## 2020-05-25 RX ADMIN — CITALOPRAM 10 MG: 20 TABLET, FILM COATED ORAL at 08:42

## 2020-05-25 RX ADMIN — RITONAVIR 100 MG: 100 TABLET ORAL at 08:41

## 2020-05-25 RX ADMIN — NICOTINE POLACRILEX 4 MG: 2 GUM, CHEWING BUCCAL at 14:22

## 2020-05-25 RX ADMIN — DIVALPROEX SODIUM 250 MG: 250 TABLET, DELAYED RELEASE ORAL at 08:42

## 2020-05-25 RX ADMIN — GUAIFENESIN 400 MG: 400 TABLET, FILM COATED ORAL at 14:21

## 2020-05-25 RX ADMIN — INSULIN LISPRO 2 UNITS: 100 INJECTION, SOLUTION INTRAVENOUS; SUBCUTANEOUS at 12:22

## 2020-05-25 RX ADMIN — INSULIN LISPRO 4 UNITS: 100 INJECTION, SOLUTION INTRAVENOUS; SUBCUTANEOUS at 21:12

## 2020-05-25 RX ADMIN — METFORMIN HYDROCHLORIDE 1000 MG: 1000 TABLET, FILM COATED ORAL at 16:54

## 2020-05-25 RX ADMIN — GUAIFENESIN 400 MG: 400 TABLET, FILM COATED ORAL at 08:42

## 2020-05-25 RX ADMIN — LISINOPRIL 40 MG: 20 TABLET ORAL at 08:41

## 2020-05-25 RX ADMIN — IPRATROPIUM BROMIDE AND ALBUTEROL SULFATE 1 AMPULE: .5; 3 SOLUTION RESPIRATORY (INHALATION) at 09:30

## 2020-05-25 RX ADMIN — Medication 100 MG: at 08:42

## 2020-05-25 RX ADMIN — BUDESONIDE 1000 MCG: 0.5 SUSPENSION RESPIRATORY (INHALATION) at 09:30

## 2020-05-25 RX ADMIN — NICOTINE POLACRILEX 4 MG: 2 GUM, CHEWING BUCCAL at 23:44

## 2020-05-25 RX ADMIN — ATAZANAVIR 300 MG: 300 CAPSULE, GELATIN COATED ORAL at 08:42

## 2020-05-25 RX ADMIN — INSULIN LISPRO 2 UNITS: 100 INJECTION, SOLUTION INTRAVENOUS; SUBCUTANEOUS at 08:45

## 2020-05-25 RX ADMIN — NALTREXONE HYDROCHLORIDE 50 MG: 50 TABLET, FILM COATED ORAL at 08:42

## 2020-05-25 RX ADMIN — INSULIN LISPRO 8 UNITS: 100 INJECTION, SOLUTION INTRAVENOUS; SUBCUTANEOUS at 16:59

## 2020-05-25 RX ADMIN — NICOTINE POLACRILEX 4 MG: 2 GUM, CHEWING BUCCAL at 08:42

## 2020-05-25 RX ADMIN — METFORMIN HYDROCHLORIDE 1000 MG: 1000 TABLET, FILM COATED ORAL at 08:42

## 2020-05-25 RX ADMIN — FOLIC ACID 1 MG: 1 TABLET ORAL at 08:42

## 2020-05-25 ASSESSMENT — PAIN SCALES - GENERAL
PAINLEVEL_OUTOF10: 10
PAINLEVEL_OUTOF10: 0
PAINLEVEL_OUTOF10: 0

## 2020-05-25 NOTE — PLAN OF CARE
care planning  Description: Participates in care planning  Outcome: Ongoing  Goal: Patient specific goal  Description: Patient specific goal  Outcome: Ongoing     Problem: Pain:  Goal: Pain level will decrease  Description: Pain level will decrease  Outcome: Ongoing  Goal: Control of acute pain  Description: Control of acute pain  Outcome: Ongoing  Goal: Control of chronic pain  Description: Control of chronic pain  Outcome: Ongoing

## 2020-05-25 NOTE — PROGRESS NOTES
BEHAVIORAL HEALTH FOLLOW-UP NOTE     5/25/2020     Patient was seen and examined in person, Chart reviewed   Patient's case discussed with staff/team    Chief Complaint: \" I Want to go to rehab. \"    Interim History: Patient is up only with a bright pleasant affect. He states he wants to go to inpatient rehab. He vehemently denies any suicidal homicidal ideations intent or plan. He is eating well sleeping well there are no neurovegetative signs of depression. He denies any alcohol withdrawals. He denies any auditory visual hallucinations states he is feeling better. He denies any side effects of naltrexone. He was up on the unit talking to his girlfriend on the phone. He is hopeful for inpatient rehab. He denies any withdrawal symptoms      Appetite:   [x] Normal/Unchanged  [] Increased  [] Decreased      Sleep:       [x] Normal/Unchanged  [] Fair       [] Poor              Energy:    [x] Normal/Unchanged  [] Increased  [] Decreased        SI [] Present  [x] Absent    HI  []Present  [x] Absent     Aggression:  [] yes  [x] no    Patient is [x] able  [] unable to CONTRACT FOR SAFETY     PAST MEDICAL/PSYCHIATRIC HISTORY:   Past Medical History:   Diagnosis Date    [facility]     [facility]     Hypertension     Neuropathy        FAMILY/SOCIAL HISTORY:  Family History   Problem Relation Age of Onset    Diabetes Mother     Prostate Cancer Father      Social History     Socioeconomic History    Marital status:       Spouse name: Not on file    Number of children: 0    Years of education: 8    Highest education level: Not on file   Occupational History     Employer: UNEMPLOYED   Social Needs    Financial resource strain: Not on file    Food insecurity     Worry: Not on file     Inability: Not on file    Transportation needs     Medical: Not on file     Non-medical: Not on file   Tobacco Use    Smoking status: Current Every Day Smoker Oral, 2 times per day, Abdelrahman Smith, APRN - CNP, 540 mg at 05/25/20 0842    glucose (GLUTOSE) 40 % oral gel 15 g, 15 g, Oral, PRN, Gladys Siegel MD    dextrose 50 % IV solution, 12.5 g, Intravenous, PRN, Gladys Siegel MD    glucagon (rDNA) injection 1 mg, 1 mg, Intramuscular, PRN, Gladys Siegel MD    dextrose 5 % solution, 100 mL/hr, Intravenous, PRN, Gladys Siegel MD    folic acid (FOLVITE) tablet 1 mg, 1 mg, Oral, Daily, Gladys Siegel MD, 1 mg at 05/25/20 0842    lisinopril (PRINIVIL;ZESTRIL) tablet 40 mg, 40 mg, Oral, Daily, Gladys Siegel MD, 40 mg at 05/25/20 0841    metFORMIN (GLUCOPHAGE) tablet 1,000 mg, 1,000 mg, Oral, BID WC, Gladys Siegel MD, 1,000 mg at 05/25/20 4518    vitamin B-1 (THIAMINE) tablet 100 mg, 100 mg, Oral, Daily, Gladys Siegel MD, 100 mg at 05/25/20 0885    perflutren lipid microspheres (DEFINITY) injection 1.65 mg, 1.5 mL, Intravenous, ONCE PRN, Gladys Siegel MD      Examination:  /81   Pulse 71   Temp 97.5 °F (36.4 °C) (Oral)   Resp 16   Ht 5' 8\" (1.727 m)   Wt 170 lb (77.1 kg)   SpO2 100%   BMI 25.85 kg/m²   Gait - steady  Medication side effects(SE): Denies    Mental Status Examination:    Level of consciousness:  within normal limits   Appearance:  fair grooming and fair hygiene  Behavior/Motor:  no abnormalities noted  Attitude toward examiner:  cooperative  Speech:  spontaneous, normal rate and normal volume   Mood: \" My mood is good. \"  Affect: Appropriate pleasant  Thought processes: Linear without flights of ideas or loose associations   Thought content: Devoid of any auditory visualizations delusions or any other perceptual abnormalities  Cognition:  oriented to person, place, and time   Concentration intact  Insight fair   Judgement fair     ASSESSMENT:   Patient symptoms are:  [] Well controlled  [x] Improving  [] Worsening  [] No change      Diagnosis:   Principal Problem:    MDD (major depressive disorder), recurrent episode, moderate ([address]  Active Problems:    Human immunodeficiency virus (HIV) disease ([facility]    HTN (hypertension)    Diabetes mellitus ([facility]    Tobacco abuse    Cocaine abuse ([facility]    Alcohol dependence ([facility]    Substance-induced psychotic disorder ([facility]    Nonspecific ST-T wave electrocardiographic changes  Resolved Problems:    * No resolved hospital problems. *      LABS:    No results for input(s): WBC, HGB, PLT in the last 72 hours. No results for input(s): NA, K, CL, CO2, BUN, CREATININE, GLUCOSE in the last 72 hours. No results for input(s): BILITOT, ALKPHOS, AST, ALT in the last 72 hours. Lab Results   Component Value Date    LABAMPH NOT DETECTED 05/21/2020    BARBSCNU NOT DETECTED 05/21/2020    LABBENZ NOT DETECTED 05/21/2020    LABMETH NOT DETECTED 05/21/2020    OPIATESCREENURINE NOT DETECTED 05/21/2020    PHENCYCLIDINESCREENURINE NOT DETECTED 05/21/2020    ETOH <10 05/21/2020     Lab Results   Component Value Date    TSH 2.680 04/08/2020     No results found for: LITHIUM  No results found for: VALPROATE, CBMZ    RISK ASSESSMENT: Low risk for suicide    Treatment Plan:  Reviewed current Medications with the patient. Risks, benefits, side effects, drug-to-drug interactions and alternatives to treatment were discussed. Collateral information:   CD evaluation  Encourage patient to attend group and other milieu activities.   Discharge planning discussed with the patient and treatment team.    Celexa 20 mg daily for depression   Naltrexone 50 mg daily for alcohol cravings   Librium 25 mg every 8 hours as needed for alcohol withdrawal    PSYCHOTHERAPY/COUNSELING:  [x] Therapeutic interview  [x] Supportive  [] CBT  [] Ongoing  [] Other    [x] Patient continues to need, on a daily basis, active treatment furnished directly by or requiring the supervision of inpatient psychiatric personnel      Anticipated Length of stay: 3 to 5 days based on stability            Electronically signed by NICHOLAS Gastelum CNP on 5/25/2020 at 3:46 PM

## 2020-05-25 NOTE — GROUP NOTE
Date: 5/25/2020    Group Start Time: 1020  Group End Time: 1110  Group Topic: Psychoeducation    [facility]    Peace Sánchez, [address]                                                                        Group Therapy Note    Date: 5/25/2020  Type of Group: Psychoeducation    Wellness Binder Information  Patient's Goal: patient will be able to id specific coping skills one can use to help self thru stressful times. Notes:  pleasant and engaged in group able to share when prompted     Status After Intervention:  Improved    Participation Level:  Active Listener and Interactive    Participation Quality: Appropriate, Attentive, Sharing, and Supportive      Speech: normal       Thought Process/Content: Logical  Linear      Affective Functioning: Congruent      Mood: euthymic      Level of consciousness:  Alert, Oriented x4, and Attentive      Response to Learning: Able to verbalize/acknowledge new learning, Able to retain information, and Progressing to goal      Endings: None Reported    Modes of Intervention: Education, Support, Socialization, Exploration, and Problem-solving      Discipline Responsible: Psychoeducational Specialist      Signature:  Prisca Amos

## 2020-05-26 LAB
HAV IGM SER IA-ACNC: NORMAL
HEPATITIS B CORE IGM ANTIBODY: NORMAL
HEPATITIS B SURFACE ANTIGEN INTERPRETATION: NORMAL
HEPATITIS C ANTIBODY INTERPRETATION: NORMAL
LV EF: 60 %
LVEF MODALITY: NORMAL
METER GLUCOSE: 155 MG/DL (ref 74–99)
METER GLUCOSE: 202 MG/DL (ref 74–99)
METER GLUCOSE: 227 MG/DL (ref 74–99)
METER GLUCOSE: 229 MG/DL (ref 74–99)
RPR: NORMAL
VALPROIC ACID LEVEL: 9 MCG/ML (ref 50–100)

## 2020-05-26 PROCEDURE — 6370000000 HC RX 637 (ALT 250 FOR IP): Performed by: NURSE PRACTITIONER

## 2020-05-26 PROCEDURE — 93306 TTE W/DOPPLER COMPLETE: CPT

## 2020-05-26 PROCEDURE — 6370000000 HC RX 637 (ALT 250 FOR IP): Performed by: INTERNAL MEDICINE

## 2020-05-26 PROCEDURE — 36415 COLL VENOUS BLD VENIPUNCTURE: CPT

## 2020-05-26 PROCEDURE — 6370000000 HC RX 637 (ALT 250 FOR IP): Performed by: PSYCHIATRY & NEUROLOGY

## 2020-05-26 PROCEDURE — 82962 GLUCOSE BLOOD TEST: CPT

## 2020-05-26 PROCEDURE — 1240000000 HC EMOTIONAL WELLNESS R&B

## 2020-05-26 PROCEDURE — 80164 ASSAY DIPROPYLACETIC ACD TOT: CPT

## 2020-05-26 PROCEDURE — C8929 TTE W OR WO FOL WCON,DOPPLER: HCPCS

## 2020-05-26 PROCEDURE — 94640 AIRWAY INHALATION TREATMENT: CPT

## 2020-05-26 PROCEDURE — 99232 SBSQ HOSP IP/OBS MODERATE 35: CPT | Performed by: NURSE PRACTITIONER

## 2020-05-26 PROCEDURE — 6360000002 HC RX W HCPCS: Performed by: INTERNAL MEDICINE

## 2020-05-26 RX ORDER — ANALGESIC BALM 1.74; 4.06 G/29G; G/29G
OINTMENT TOPICAL PRN
Status: DISCONTINUED | OUTPATIENT
Start: 2020-05-26 | End: 2020-05-27 | Stop reason: HOSPADM

## 2020-05-26 RX ADMIN — INSULIN LISPRO 2 UNITS: 100 INJECTION, SOLUTION INTRAVENOUS; SUBCUTANEOUS at 12:08

## 2020-05-26 RX ADMIN — LISINOPRIL 40 MG: 20 TABLET ORAL at 08:53

## 2020-05-26 RX ADMIN — INSULIN GLARGINE 32 UNITS: 100 INJECTION, SOLUTION SUBCUTANEOUS at 20:59

## 2020-05-26 RX ADMIN — METFORMIN HYDROCHLORIDE 1000 MG: 1000 TABLET, FILM COATED ORAL at 08:54

## 2020-05-26 RX ADMIN — GUAIFENESIN 400 MG: 400 TABLET, FILM COATED ORAL at 20:59

## 2020-05-26 RX ADMIN — MENTHOL AND METHYL SALICYLATE: 7.6; 29 OINTMENT TOPICAL at 21:01

## 2020-05-26 RX ADMIN — INSULIN LISPRO 4 UNITS: 100 INJECTION, SOLUTION INTRAVENOUS; SUBCUTANEOUS at 16:32

## 2020-05-26 RX ADMIN — GUAIFENESIN 400 MG: 400 TABLET, FILM COATED ORAL at 13:23

## 2020-05-26 RX ADMIN — INSULIN LISPRO 2 UNITS: 100 INJECTION, SOLUTION INTRAVENOUS; SUBCUTANEOUS at 21:00

## 2020-05-26 RX ADMIN — MONTELUKAST SODIUM 10 MG: 10 TABLET, FILM COATED ORAL at 20:59

## 2020-05-26 RX ADMIN — ACETAMINOPHEN 650 MG: 325 TABLET, FILM COATED ORAL at 15:36

## 2020-05-26 RX ADMIN — INSULIN LISPRO 4 UNITS: 100 INJECTION, SOLUTION INTRAVENOUS; SUBCUTANEOUS at 08:50

## 2020-05-26 RX ADMIN — TRAZODONE HYDROCHLORIDE 50 MG: 50 TABLET ORAL at 20:59

## 2020-05-26 RX ADMIN — NICOTINE POLACRILEX 4 MG: 2 GUM, CHEWING BUCCAL at 15:38

## 2020-05-26 RX ADMIN — GUAIFENESIN 400 MG: 400 TABLET, FILM COATED ORAL at 08:53

## 2020-05-26 RX ADMIN — METFORMIN HYDROCHLORIDE 1500 MG: 1000 TABLET ORAL at 16:33

## 2020-05-26 RX ADMIN — IPRATROPIUM BROMIDE AND ALBUTEROL SULFATE 1 AMPULE: .5; 3 SOLUTION RESPIRATORY (INHALATION) at 09:20

## 2020-05-26 RX ADMIN — NALTREXONE HYDROCHLORIDE 50 MG: 50 TABLET, FILM COATED ORAL at 08:53

## 2020-05-26 RX ADMIN — Medication 100 MG: at 08:52

## 2020-05-26 RX ADMIN — NICOTINE POLACRILEX 4 MG: 2 GUM, CHEWING BUCCAL at 22:04

## 2020-05-26 RX ADMIN — ACETAMINOPHEN 650 MG: 325 TABLET, FILM COATED ORAL at 22:02

## 2020-05-26 RX ADMIN — ATAZANAVIR 300 MG: 300 CAPSULE, GELATIN COATED ORAL at 08:52

## 2020-05-26 RX ADMIN — BUDESONIDE 1000 MCG: 0.5 SUSPENSION RESPIRATORY (INHALATION) at 09:21

## 2020-05-26 RX ADMIN — RITONAVIR 100 MG: 100 TABLET ORAL at 08:53

## 2020-05-26 RX ADMIN — FOLIC ACID 1 MG: 1 TABLET ORAL at 08:52

## 2020-05-26 RX ADMIN — NICOTINE POLACRILEX 4 MG: 2 GUM, CHEWING BUCCAL at 08:56

## 2020-05-26 RX ADMIN — CITALOPRAM 20 MG: 20 TABLET, FILM COATED ORAL at 08:53

## 2020-05-26 ASSESSMENT — PAIN SCALES - GENERAL
PAINLEVEL_OUTOF10: 5
PAINLEVEL_OUTOF10: 0
PAINLEVEL_OUTOF10: 0
PAINLEVEL_OUTOF10: 10

## 2020-05-26 NOTE — GROUP NOTE
Group Therapy Note    Date: 5/26/2020    Group Start Time: 1115  Group End Time: 1150  Group Topic: Recovery    [facility] [address], MSW, LSW        Group Therapy Note    Attendees: 11       Patient's Goal:  Gain understanding and insight into cognitive distortions. Notes:  Pt actively participated in group although was having side conversations with others and was redirected. Status After Intervention:  Unchanged    Participation Level:  Active Listener and Interactive    Participation Quality: Appropriate, Attentive and Sharing      Speech:  normal      Thought Process/Content: Logical      Affective Functioning: Congruent      Mood: euthymic      Level of consciousness:  Alert, Oriented x4 and Attentive      Response to Learning: Able to verbalize current knowledge/experience      Endings: None Reported    Modes of Intervention: Education, Support, Socialization and Exploration      Discipline Responsible: /Counselor      Signature:  FRANCISCO Rivera, SUEW

## 2020-05-26 NOTE — PLAN OF CARE
Patient denies suicidal ideations, homicidal ideations and hallucinations. Presents calm and cooperative. Patient was brightened this shift. Patient has been out on unit and is social with peers. Medications taken without issue. No complaints or concerns voiced at this time. No unit problems reported. Will continue to observe and support.      Problem: Altered Mood, Depressive Behavior:  Goal: Able to verbalize and/or display a decrease in depressive symptoms  Description: Able to verbalize and/or display a decrease in depressive symptoms  Outcome: Met This Shift     Problem: Altered Mood, Psychotic Behavior:  Goal: Able to verbalize decrease in frequency and intensity of hallucinations  Description: Able to verbalize decrease in frequency and intensity of hallucinations  Outcome: Met This Shift

## 2020-05-26 NOTE — PROGRESS NOTES
BEHAVIORAL HEALTH FOLLOW-UP NOTE     5/26/2020     Patient was seen and examined in person, Chart reviewed   Patient's case discussed with staff/team    Chief Complaint: \" I Want to go to rehab. \"    Interim History: Patient is up only with a bright pleasant affect. He states he wants to go to inpatient rehab. He vehemently denies any suicidal homicidal ideations intent or plan. He is eating well sleeping well there are no neurovegetative signs of depression. He denies any alcohol withdrawals. He denies any auditory visual hallucinations states he is feeling better. He denies any side effects of naltrexone. He is hopeful for inpatient rehab. He denies any withdrawal symptoms      Appetite:   [x] Normal/Unchanged  [] Increased  [] Decreased      Sleep:       [x] Normal/Unchanged  [] Fair       [] Poor              Energy:    [x] Normal/Unchanged  [] Increased  [] Decreased        SI [] Present  [x] Absent    HI  []Present  [x] Absent     Aggression:  [] yes  [x] no    Patient is [x] able  [] unable to CONTRACT FOR SAFETY     PAST MEDICAL/PSYCHIATRIC HISTORY:   Past Medical History:   Diagnosis Date    [facility]     [facility] infection) ([facility]     Hypertension     Neuropathy        FAMILY/SOCIAL HISTORY:  Family History   Problem Relation Age of Onset    Diabetes Mother     Prostate Cancer Father      Social History     Socioeconomic History    Marital status:       Spouse name: Not on file    Number of children: 0    Years of education: 8    Highest education level: Not on file   Occupational History     Employer: UNEMPLOYED   Social Needs    Financial resource strain: Not on file    Food insecurity     Worry: Not on file     Inability: Not on file   [employer] needs     Medical: Not on file     Non-medical: Not on file   Tobacco Use    Smoking status: Current Every Day Smoker     Packs/day: 2.00     Years: 20.00     Pack years: 40.00     Types: Substance-induced psychotic disorder ([facility]    Nonspecific ST-T wave electrocardiographic changes  Resolved Problems:    * No resolved hospital problems. *      LABS:    No results for input(s): WBC, HGB, PLT in the last 72 hours. No results for input(s): NA, K, CL, CO2, BUN, CREATININE, GLUCOSE in the last 72 hours. No results for input(s): BILITOT, ALKPHOS, AST, ALT in the last 72 hours. Lab Results   Component Value Date    LABAMPH NOT DETECTED 05/21/2020    BARBSCNU NOT DETECTED 05/21/2020    LABBENZ NOT DETECTED 05/21/2020    LABMETH NOT DETECTED 05/21/2020    OPIATESCREENURINE NOT DETECTED 05/21/2020    PHENCYCLIDINESCREENURINE NOT DETECTED 05/21/2020    ETOH <10 05/21/2020     Lab Results   Component Value Date    TSH 2.680 04/08/2020     No results found for: LITHIUM  Lab Results   Component Value Date    VALPROATE 9 (L) 05/26/2020       RISK ASSESSMENT: Low risk for suicide    Treatment Plan:  Reviewed current Medications with the patient. Risks, benefits, side effects, drug-to-drug interactions and alternatives to treatment were discussed. Collateral information:   CD evaluation  Encourage patient to attend group and other milieu activities.   Discharge planning discussed with the patient and treatment team.    Celexa 20 mg daily for depression   Naltrexone 50 mg daily for alcohol cravings   Librium 25 mg every 8 hours as needed for alcohol withdrawal    PSYCHOTHERAPY/COUNSELING:  [x] Therapeutic interview  [x] Supportive  [] CBT  [] Ongoing  [] Other    [x] Patient continues to need, on a daily basis, active treatment furnished directly by or requiring the supervision of inpatient psychiatric personnel      Anticipated Length of stay: 3 to 5 days based on stability            Electronically signed by NICHOLAS Vernon CNP on 8/66/4889 at 3:45 PM

## 2020-05-26 NOTE — GROUP NOTE
Group Therapy Note    Date: 5/26/2020    Group Start Time: 1000  Group End Time: 3953  Group Topic: Psychoeducation    [facility]    Mackenzie Perez, CTRS        Group Therapy Note      Number of participants: 12  Type of group: Psychoeducation  Mode of intervention: Education, Support, Socialization, Exploration, Clarifying, and Problem-solving  Topic: Time Management   Objective: Pt will identify 3 ways to help with time management in recovery. Patient's Goal:  \"Stay calm and get to rehab\"     Notes:  Pt was interactive during group sharing 3 ways to help with time management in recovery. Pt gave support and feedback to others. Status After Intervention:  Improved    Participation Level:  Active Listener and Interactive    Participation Quality: Appropriate, Attentive, Sharing and Supportive      Speech:  normal      Thought Process/Content: Logical      Affective Functioning: Congruent      Mood: euthymic      Level of consciousness:  Alert, Oriented x4 and Attentive      Response to Learning: Able to verbalize current knowledge/experience, Able to verbalize/acknowledge new learning, Able to retain information, Capable of insight, Able to change behavior and Progressing to goal      Endings: None Reported    Modes of Intervention: Education, Support, Socialization, Exploration, Clarifying and Problem-solving

## 2020-05-26 NOTE — CARE COORDINATION
Per Ailyn Carrion, no referral made yet. SW made referral and then updated pt and pt requesting additional referrals. ENRIQUE then referred to [facility], and [facility] as well. Ailyn Carrion, called back and accepting pt on 5/27 @ 2pm.  Per Josue Abarca, make sure he has everything for diabetes management to take with him.

## 2020-05-27 VITALS
HEART RATE: 104 BPM | RESPIRATION RATE: 16 BRPM | TEMPERATURE: 98.1 F | BODY MASS INDEX: 25.76 KG/M2 | WEIGHT: 170 LBS | SYSTOLIC BLOOD PRESSURE: 112 MMHG | OXYGEN SATURATION: 96 % | HEIGHT: 68 IN | DIASTOLIC BLOOD PRESSURE: 77 MMHG

## 2020-05-27 LAB
METER GLUCOSE: 185 MG/DL (ref 74–99)
METER GLUCOSE: 69 MG/DL (ref 74–99)

## 2020-05-27 PROCEDURE — 6360000002 HC RX W HCPCS: Performed by: INTERNAL MEDICINE

## 2020-05-27 PROCEDURE — 6370000000 HC RX 637 (ALT 250 FOR IP): Performed by: INTERNAL MEDICINE

## 2020-05-27 PROCEDURE — 6370000000 HC RX 637 (ALT 250 FOR IP): Performed by: NURSE PRACTITIONER

## 2020-05-27 PROCEDURE — 82962 GLUCOSE BLOOD TEST: CPT

## 2020-05-27 PROCEDURE — 94640 AIRWAY INHALATION TREATMENT: CPT

## 2020-05-27 PROCEDURE — 99239 HOSP IP/OBS DSCHRG MGMT >30: CPT | Performed by: NURSE PRACTITIONER

## 2020-05-27 RX ORDER — LANOLIN ALCOHOL/MO/W.PET/CERES
100 CREAM (GRAM) TOPICAL DAILY
Qty: 30 TABLET | Refills: 1 | Status: SHIPPED | OUTPATIENT
Start: 2020-05-27 | End: 2020-05-27 | Stop reason: SDUPTHER

## 2020-05-27 RX ORDER — INSULIN GLARGINE 100 [IU]/ML
35 INJECTION, SOLUTION SUBCUTANEOUS NIGHTLY
Status: DISCONTINUED | OUTPATIENT
Start: 2020-05-27 | End: 2020-05-27 | Stop reason: HOSPADM

## 2020-05-27 RX ORDER — LANOLIN ALCOHOL/MO/W.PET/CERES
3 CREAM (GRAM) TOPICAL NIGHTLY PRN
Qty: 15 TABLET | Refills: 0 | Status: SHIPPED | OUTPATIENT
Start: 2020-05-27 | End: 2020-06-26

## 2020-05-27 RX ORDER — INSULIN GLARGINE 100 [IU]/ML
35 INJECTION, SOLUTION SUBCUTANEOUS NIGHTLY
Qty: 5 PEN | Refills: 5 | Status: SHIPPED
Start: 2020-05-27 | End: 2020-05-27 | Stop reason: HOSPADM

## 2020-05-27 RX ORDER — BUDESONIDE 0.5 MG/2ML
1 INHALANT ORAL 2 TIMES DAILY
Qty: 60 AMPULE | Refills: 3 | Status: SHIPPED | OUTPATIENT
Start: 2020-05-27

## 2020-05-27 RX ORDER — IPRATROPIUM BROMIDE AND ALBUTEROL SULFATE 2.5; .5 MG/3ML; MG/3ML
3 SOLUTION RESPIRATORY (INHALATION) EVERY 4 HOURS PRN
Qty: 360 ML | Refills: 0 | Status: SHIPPED | OUTPATIENT
Start: 2020-05-27

## 2020-05-27 RX ORDER — MULTIVITAMIN WITH FOLIC ACID 400 MCG
1 TABLET ORAL DAILY
Qty: 30 TABLET | Refills: 1 | Status: SHIPPED
Start: 2020-05-27 | End: 2020-05-27 | Stop reason: HOSPADM

## 2020-05-27 RX ORDER — IPRATROPIUM BROMIDE AND ALBUTEROL SULFATE 2.5; .5 MG/3ML; MG/3ML
1 SOLUTION RESPIRATORY (INHALATION) EVERY 4 HOURS PRN
Status: DISCONTINUED | OUTPATIENT
Start: 2020-05-27 | End: 2020-05-27 | Stop reason: HOSPADM

## 2020-05-27 RX ORDER — CITALOPRAM 20 MG/1
20 TABLET ORAL DAILY
Qty: 30 TABLET | Refills: 0 | Status: SHIPPED | OUTPATIENT
Start: 2020-05-27 | End: 2020-06-26

## 2020-05-27 RX ORDER — LANOLIN ALCOHOL/MO/W.PET/CERES
100 CREAM (GRAM) TOPICAL DAILY
Qty: 30 TABLET | Refills: 0 | Status: SHIPPED | OUTPATIENT
Start: 2020-05-27 | End: 2020-07-14

## 2020-05-27 RX ORDER — INSULIN GLARGINE 100 [IU]/ML
35 INJECTION, SOLUTION SUBCUTANEOUS NIGHTLY
Qty: 9 VIAL | Refills: 2 | Status: ON HOLD
Start: 2020-05-27 | End: 2021-10-09 | Stop reason: HOSPADM

## 2020-05-27 RX ORDER — NALTREXONE HYDROCHLORIDE 50 MG/1
50 TABLET, FILM COATED ORAL
Qty: 30 TABLET | Refills: 0 | Status: SHIPPED | OUTPATIENT
Start: 2020-05-28 | End: 2020-06-27

## 2020-05-27 RX ORDER — FOLIC ACID 1 MG/1
1 TABLET ORAL DAILY
Qty: 30 TABLET | Refills: 0 | Status: SHIPPED | OUTPATIENT
Start: 2020-05-27 | End: 2020-06-26

## 2020-05-27 RX ADMIN — RITONAVIR 100 MG: 100 TABLET ORAL at 09:02

## 2020-05-27 RX ADMIN — IPRATROPIUM BROMIDE AND ALBUTEROL SULFATE 1 AMPULE: .5; 3 SOLUTION RESPIRATORY (INHALATION) at 09:50

## 2020-05-27 RX ADMIN — METFORMIN HYDROCHLORIDE 1500 MG: 1000 TABLET ORAL at 09:01

## 2020-05-27 RX ADMIN — FOLIC ACID 1 MG: 1 TABLET ORAL at 09:06

## 2020-05-27 RX ADMIN — BUDESONIDE 1000 MCG: 0.5 SUSPENSION RESPIRATORY (INHALATION) at 09:50

## 2020-05-27 RX ADMIN — Medication 100 MG: at 09:02

## 2020-05-27 RX ADMIN — NALTREXONE HYDROCHLORIDE 50 MG: 50 TABLET, FILM COATED ORAL at 09:01

## 2020-05-27 RX ADMIN — INSULIN LISPRO 3 UNITS: 100 INJECTION, SOLUTION INTRAVENOUS; SUBCUTANEOUS at 09:11

## 2020-05-27 RX ADMIN — CITALOPRAM 20 MG: 20 TABLET, FILM COATED ORAL at 09:02

## 2020-05-27 RX ADMIN — ATAZANAVIR 300 MG: 300 CAPSULE, GELATIN COATED ORAL at 09:02

## 2020-05-27 RX ADMIN — LISINOPRIL 40 MG: 20 TABLET ORAL at 09:02

## 2020-05-27 RX ADMIN — GUAIFENESIN 400 MG: 400 TABLET, FILM COATED ORAL at 09:01

## 2020-05-27 ASSESSMENT — PAIN SCALES - GENERAL: PAINLEVEL_OUTOF10: 0

## 2020-05-27 NOTE — PROGRESS NOTES
[facility]  Discharge Note    Pt discharged with followings belongings:   Dentures: None  Vision - Corrective Lenses: Glasses  Hearing Aid: None  Jewelry: None  Clothing: Footwear, Jacket / coat, Pants, Shirt, Sweater, Socks, Undergarments (Comment)  Were All Patient Medications Collected?: Yes  Other Valuables: Wallet, Keys(12 dollars cash 1 bank card )   Valuables sent with pt. Valuables retrieved from safe, Security envelope number:  [id number] and returned to patient. Patient education on aftercare instructions: given, with suicide crisis management plan, along with filled prescriptions, home medications,nebulizer and lockered items. Information faxed to [facility] by . Patient verbalize understanding of AVS:  Yes with stated potential to comply to same.  .    Status EXAM upon discharge:  Status and Exam  Normal: Yes  Facial Expression: Brightened  Affect: Appropriate  Level of Consciousness: Alert  Mood:Normal: Yes  Mood: pleasant  Motor Activity:Normal: Yes  Interview Behavior:  Cooperative  Preception: [place] to Person, [place] to Time, [place] to Place, [place] to Situation  Attention:Normal: Yes  Thought Processes: ORGANIZED  Thought Content:Normal: Yes  Hallucinations: None  Delusions: No  Memory:Normal: Yes  Insight and Judgment: Improved  Present Suicidal Ideation: No  Present Homicidal Ideation: No      Metabolic Screening:    Lab Results   Component Value Date    LABA1C 11.4 (H) 05/22/2020       Lab Results   Component Value Date    CHOL 233 (H) 05/22/2020    CHOL 173 11/05/2014    CHOL 184 05/01/2014    CHOL 156 10/14/2013    CHOL 120 11/22/2010     Lab Results   Component Value Date    TRIG 144 05/22/2020    TRIG 93 11/05/2014    TRIG 245 (H) 05/01/2014    TRIG 302 (H) 10/14/2013    TRIG 84 11/22/2010     Lab Results   Component Value Date     05/22/2020    HDL 44 11/05/2014    HDL 60 05/01/2014    HDL 58.0 10/14/2013    HDL 34.0 (A) 11/22/2010     No components found

## 2020-05-27 NOTE — PROGRESS NOTES
K 3.7 05/22/2020    K 4.4 01/26/2020    CL 99 05/22/2020    CO2 25 05/22/2020    BUN 16 05/22/2020    LABALBU 4.5 05/22/2020    LABALBU 4.3 11/22/2010    CREATININE 1.0 05/22/2020    CALCIUM 9.9 05/22/2020    GFRAA >60 05/22/2020    LABGLOM >60 05/22/2020    GLUCOSE 59 05/22/2020    GLUCOSE 112 11/25/2010       Resident's Assessment and Plan       1. Uncontrolled DM- A1c 11.4%  2. HTN- controlled on lisinopril will continue  3. HIV- ID following  4. SOB.- ? COPD- bx tx PRN  5. ST changes- ECHO EF 60% mod LVH mild MR.   6. Chronic back pain- analgesic balm  7. Tobacco use- on nicotine patch  8. Polysubstance abuse. For rehab on dc  9. MDD- Auditory and visual hallucinations, with suicidal ideation, management per psych. · Increase Lantus to 35 U nightly, increase to HDSS  · Continue metformin 1500 BID  · Rest of management per psych    PT/OT evaluation: not ordered  DVT prophylaxis/ GI prophylaxis: pt ambulatory, diet  Disposition: inpatient to rehab. Code status:Full code    Arabella Brantley MD, PGY-2  Attending physician: Dr. Gianna Agosto.

## 2020-05-27 NOTE — PLAN OF CARE
[facility]  Week Interdisciplinary Treatment Plan Note     Review Date & Time: 5/27/2020 1000    Patient was in treatment team.    Admission Type:   Admission Type: Involuntary    Reason for admission:  Reason for Admission: \"I need help badly\" pr pt. Estimated Length of Stay Update:  1 WEEK   Estimated Discharge Date Update: DISCHARGED TO [facility] TODAY    PATIENT STRENGTHS:  Patient Strengths:Strengths: Motivated, Positive Support  Patient Strengths and Limitations:Limitations: Inappropriate/potentially harmful leisure interests  Addictive Behavior:Addictive Behavior  In the past 3 months, have you felt or has someone told you that you have a problem with:  : None, Excessive Fluid intake  Do you have a history of Chemical Use?: Yes  Do you have a history of Alcohol Use?: Yes  Do you have a history of Street Drug Abuse?: Yes  Histroy of Prescripton Drug Abuse?: No  Medical Problems:   Past Medical History:   Diagnosis Date    Diabetes mellitus ([facility]     HIV ([facility] immunodeficiency virus infection) ([facility]     Hypertension     Neuropathy        Risk:  Fall RiskTotal: 73  Roberto Scale Roberto Scale Score: 22  BVC Total: 0  Change in scores: NO FALLS OR ROBERTO SCALE/RISK IDENTIFIED THIS SHIFT.  Changes to plan of Care : DISCHARGE TO [facility] TODAY    Status EXAM:   Status and Exam  Normal: Yes  Facial Expression: Brightened  Affect: Appropriate  Level of Consciousness: Alert  Mood:Normal: Yes  Mood: Depressed, Sad  Motor Activity:Normal: Yes  Motor Activity: Decreased  Interview Behavior: Impulsive, Cooperative  Preception: [place] to Person, [place] to Time, [place] to Place, [place] to Situation  Attention:Normal: Yes  Attention: Distractible  Thought Processes: (ORGANIZED)  Thought Content:Normal: Yes  Hallucinations: None  Delusions: No  Memory:Normal: Yes  Memory: Poor Recent  Insight and Judgment: No  Insight and Judgment: (IMPAIRED)  Present Suicidal Ideation: No  Present Homicidal Ideation: No    Daily Assessment Last Entry:   Daily Sleep (WDL): Within Defined Limits         Patient Currently in Pain: No  Daily Nutrition (WDL): Within Defined Limits    Patient Monitoring:  Frequency of Checks: 4 times per hour, close    Psychiatric Symptoms:   Depression Symptoms  Depression Symptoms: No problems reported or observed. Anxiety Symptoms  Anxiety Symptoms: No problems reported or observed. Chari Symptoms  Chari Symptoms: No problems reported or observed. Psychosis Symptoms  Hallucination Type: No problems reported or observed. Delusion Type: No problems reported or observed. Suicide Risk CSSR-S:  1) Within the past month, have you wished you were dead or wished you could go to sleep and not wake up? : No  2) Have you actually had any thoughts of killing yourself? : No  3) Have you been thinking about how you might kill yourself? : No  5) Have you started to work out or worked out the details of how to kill yourself?  Do you intend to carry out this plan? : No  6) Have you ever done anything, started to do anything, or prepared to do anything to end your life?: No  Change in Result: DENIES SUICIDAL IDEATION   Change in Plan of care: [address]:   Learner Progress Toward Treatment Goals: Reviewed results and recommendations of this team    Method: Individual    Outcome: Verbalized understanding    PATIENT GOALS: \"GO TO REHAB\"    PLAN/TREATMENT RECOMMENDATIONS UPDATE:  DISCHARGE TO REHAB TODAY    GOALS UPDATE:  Time frame for Short-Term Goals: 1 WEEK      Feroz Lan RN

## 2020-05-27 NOTE — DISCHARGE SUMMARY
member of club or organization: Not on file     Attends meetings of clubs or organizations: Not on file     Relationship status: Not on file    Intimate partner violence     Fear of current or ex partner: Not on file     Emotionally abused: Not on file     Physically abused: Not on file     Forced sexual activity: Not on file   Other Topics Concern    Not on file   Social History Narrative    Not on file       MEDICATIONS:    Current Facility-Administered Medications:     insulin glargine (LANTUS) injection vial 35 Units, 35 Units, Subcutaneous, Nightly, Issa Reynaga MD    insulin lispro (HUMALOG) injection vial 0-18 Units, 0-18 Units, Subcutaneous, TID , Issa Reynaga MD, 3 Units at 05/27/20 0911    insulin lispro (HUMALOG) injection vial 0-9 Units, 0-9 Units, Subcutaneous, Nightly, Issa Reynaga MD    ipratropium-albuterol (DUONEB) nebulizer solution 1 ampule, 1 ampule, Inhalation, Q4H PRN, Issa Reynaga MD    metFORMIN (GLUCOPHAGE) tablet 1,500 mg, 1,500 mg, Oral, BID , Alonso Meyer MD, 1,500 mg at 05/27/20 0901    analgesic ointment ointment, , Topical, PRN, Alonso Meyer MD    citalopram (CELEXA) tablet 20 mg, 20 mg, Oral, Daily, NICHOLAS Wallis CNP, 20 mg at 05/27/20 0902    chlordiazePOXIDE (LIBRIUM) capsule 25 mg, 25 mg, Oral, H6L PRN, NICHOLAS Knutson - CNP    nicotine polacrilex (NICORETTE) gum 4 mg, 4 mg, Oral, PRN, Eliseo Andrew MD, 4 mg at 05/26/20 2204    montelukast (SINGULAIR) tablet 10 mg, 10 mg, Oral, Nightly, Edgar Pryor MD, 10 mg at 05/26/20 2059    guaiFENesin tablet 400 mg, 400 mg, Oral, TID, Edgar Pryor MD, 400 mg at 05/27/20 0901    budesonide (PULMICORT) nebulizer suspension 1,000 mcg, 1 mg, Nebulization, BID, Edgar Pryor MD, 1,000 mcg at 05/27/20 0950    naltrexone (DEPADE) tablet 50 mg, 50 mg, Oral, Daily with breakfast, NICHOLAS Dent - CNP, 50 mg at 05/27/20 0901    emtricitabine-tenofovir alafenamide (DESCOVY) the medical staff was a great help to him and that we \" gave him a new lease on life and a chance of making him a better person for his family \". The patient stated that the medical staff helped him so much to better quality of his life and to help him achieve mental stabilization. The patient stated that he learned a significant amount of information from attending groups. The patient stated that he learned a lot of helpful coping skills from attending group. The patient stated that he will continue to use his coping skills to  help him handle any stressful situation that he comes across in his life. The patient was extremely grateful and stated that he learned so much and that he received the help he needed here. The patient has no more suicidal, homicidal, psychotic, or manic symptomology. The patient received the required treatment with medication, participated in group milieu, remained engaged in unit activities, and learned appropriate coping skills. The patient was seen watching TV, playing games, socializing with peers, and calling friends and family. There were no mention or gestures of self-harm or harm to others. The patient's mental status returned to baseline. Treatment team felt that the patient has obtained maximal benefit out of this hospitalization does not meet the criteria for inpatient hospitalization anymore. However the patient will continue to benefit from outpatient and follow-up treatment to maintain stability. Collateral information was obtained and reconciled, there were no concerns about the patient's safety. The patient has no access to guns or weapons at this time. The patient was discharged [facility] [facility].   The patient was referred to outpatient/inpatient substance abuse rehabilitation program.  The patient was educated multiple times during the hospitalization that if the patient chooses to continue to use drugs or alcohol, the patient may potentially act out impulsively, (3) MG/3ML Soln nebulizer solution  Commonly known as:  DUONEB  Inhale 3 mLs into the lungs every 4 hours as needed for Shortness of Breath (wheezing)     naltrexone 50 MG tablet  Commonly known as:  DEPADE  Take 1 tablet by mouth daily (with breakfast)  Start taking on:  May 28, 2020     thiamine 100 MG tablet  Take 1 tablet by mouth daily        CHANGE how you take these medications    * Insulin Syringe-Needle U-100 25G X 1\" 1 ML Misc  1 each by Does not apply route 4 times daily (after meals and at bedtime) Use as directed May substitute for insurance coverage  What changed:  Another medication with the same name was added. Make sure you understand how and when to take each. * Insulin Syringe-Needle U-100 25G X 1\" 1 ML Misc  1 Units by Does not apply route three times daily Use as directed  What changed: You were already taking a medication with the same name, and this prescription was added. Make sure you understand how and when to take each.     metFORMIN 1000 MG tablet  Commonly known as:  GLUCOPHAGE  Take 1.5 tablets by mouth 2 times daily (with meals)  What changed:  how much to take         * This list has 2 medication(s) that are the same as other medications prescribed for you. Read the directions carefully, and ask your doctor or other care provider to review them with you. CONTINUE taking these medications    albuterol sulfate  (90 Base) MCG/ACT inhaler  Commonly known as:  ProAir HFA  Inhale 2 puffs into the lungs every 6 hours as needed for Wheezing     Alcohol Swabs Pads  1 strip by Does not apply route 4 times daily (after meals and at bedtime)     blood glucose test strips  Test 4x times a day & as needed for symptoms of irregular blood glucose.  May substitute for insurance coverage     citalopram 20 MG tablet  Commonly known as:  CELEXA  Take 1 tablet by mouth daily     folic acid 1 MG tablet  Commonly known as:  FOLVITE  Take 1 tablet by mouth daily     glucose monitoring

## 2020-07-14 ENCOUNTER — OFFICE VISIT (OUTPATIENT)
Dept: FAMILY MEDICINE CLINIC | Age: 45
End: 2020-07-14
Payer: COMMERCIAL

## 2020-07-14 VITALS
WEIGHT: 160.2 LBS | HEIGHT: 68 IN | TEMPERATURE: 98.5 F | SYSTOLIC BLOOD PRESSURE: 122 MMHG | HEART RATE: 101 BPM | OXYGEN SATURATION: 97 % | RESPIRATION RATE: 18 BRPM | BODY MASS INDEX: 24.28 KG/M2 | DIASTOLIC BLOOD PRESSURE: 84 MMHG

## 2020-07-14 PROCEDURE — G8420 CALC BMI NORM PARAMETERS: HCPCS | Performed by: PHYSICIAN ASSISTANT

## 2020-07-14 PROCEDURE — G8427 DOCREV CUR MEDS BY ELIG CLIN: HCPCS | Performed by: PHYSICIAN ASSISTANT

## 2020-07-14 PROCEDURE — 99213 OFFICE O/P EST LOW 20 MIN: CPT | Performed by: PHYSICIAN ASSISTANT

## 2020-07-14 PROCEDURE — 4004F PT TOBACCO SCREEN RCVD TLK: CPT | Performed by: PHYSICIAN ASSISTANT

## 2020-07-14 RX ORDER — METHYLPREDNISOLONE 4 MG/1
TABLET ORAL
Qty: 1 KIT | Refills: 0 | Status: SHIPPED | OUTPATIENT
Start: 2020-07-14 | End: 2020-07-20

## 2020-07-14 NOTE — PROGRESS NOTES
Chief Complaint:   Knee Pain (right knee pain, injured during yoga 1 week ago, swelling, [employer] at home )      History of Present Illness   Source of history provided by: patient. Anel Grijalva is a 54 y.o. old male who has a past medical history of:   Past Medical History:   Diagnosis Date    [facility] mellitus ([facility] [address]     HIV (human immunodeficiency virus infection) ([facility] [address]     Hypertension     Neuropathy    Presents to the walk in clinic for evaluation of right knee pain for the past week. Pt states he injured the site while doing yoga a week ago and forcefully flexed the knee and right hip. Pain is located posteriorly and radiates down into the calf and up into the posterior thigh and buttock. Reports associated swelling and moderate pain with ROM. Pain is also exacerbated by ambulation. Denies any weakness, paresthesias, foot/ankle pain, back pain, abrasions, fever, chills, rash, or any other symptoms. There has not been a history or prior knee problems. Denies any history of previous knee surgery. Has been taking Tylenol OTC without relief. Pt is unable to take NSAIDs. ROS    Unless otherwise stated in this report or unable to obtain because of the patient's clinical or mental status as evidenced by the medical record, this patients's positive and negative responses for Review of Systems, constitutional, psych, eyes, ENT, cardiovascular, respiratory, gastrointestinal, neurological, genitourinary, musculoskeletal, integument systems and systems related to the presenting problem are either stated in the preceding or were not pertinent or were negative for the symptoms and/or complaints related to the medical problem.jozef. Past Medical History: History reviewed. No pertinent surgical history. Social History:  reports that he has been smoking cigarettes. He has a 40.00 pack-year smoking history. He has never used smokeless tobacco. He reports current alcohol use. He reports current drug use.  Drugs: Marijuana and Cocaine. Family History: family history includes Diabetes in his mother; Prostate Cancer in his father. Allergies: Bee venom; Ibuprofen; and Nutritional supplements    Physical Exam         VS:  /84   Pulse 101   Temp 98.5 °F (36.9 °C) (Oral)   Resp 18   Ht 5' 8\" (1.727 m)   Wt 160 lb 3.2 oz (72.7 kg)   SpO2 97%   BMI 24.36 kg/m²     Oxygen Saturation Interpretation: Normal.    Constitutional:  Alert, development consistent with age. Lungs: CTAB without wheezing, rales, or rhonchi. CV: RRR without pathologic murmurs or gallops. Knee: Inspection: Right knee without obvious deformity. Tenderness:  Mild to moderate TTP over the posteromedial right knee. Swelling/Effusion: Mild edema noted over the posteriomedial right knee and right upper calf. Deformity: No obvious deformity. ROM: ROM 0º-120º with mild to moderate discomfort. Skin:  No bruising noted. No abrasions, rashes, or erythema noted. There are pronounced varicosities over the right upper calf. Drawer: Negative anterior/posterior drawer sign. Akin's: Negative Akin's sign. Valgus/Varus Stress: Negative Varus/Valgus stress sign. Crepitus: No crepitus noted with flexion and extension. Hip:            Tenderness:  No TTP.              ROM: FROM hip without pain. Joint(s) Below: Right calf/ankle/foot                Tenderness:  No TTP over calf, ankle, or foot. No edema noted. Negative Ivan's sign. ROM: FROM without pain or deficits. Neurovascular:             Sensory deficit: Sensation intact above and below the injury site. Pulse deficit: Pulses 2+ and bounding. Capillary refill: Less then 2 sec throughout. Gait:  Slightly antalgic gait, but able to bear weight with mild difficulty. Lymphatics: No lymphangitis or adenopathy noted. Neurological:  Alert and oriented.   Motor

## 2020-09-04 ENCOUNTER — APPOINTMENT (OUTPATIENT)
Dept: GENERAL RADIOLOGY | Age: 45
End: 2020-09-04
Payer: COMMERCIAL

## 2020-09-04 ENCOUNTER — HOSPITAL ENCOUNTER (EMERGENCY)
Age: 45
Discharge: HOME | End: 2020-09-04
Attending: EMERGENCY MEDICINE
Payer: COMMERCIAL

## 2020-09-04 VITALS
RESPIRATION RATE: 16 BRPM | SYSTOLIC BLOOD PRESSURE: 153 MMHG | DIASTOLIC BLOOD PRESSURE: 78 MMHG | HEIGHT: 68 IN | BODY MASS INDEX: 25.01 KG/M2 | OXYGEN SATURATION: 97 % | HEART RATE: 87 BPM | TEMPERATURE: 97 F | WEIGHT: 165 LBS

## 2020-09-04 LAB
ACETAMINOPHEN LEVEL: <5 MCG/ML (ref 10–30)
ALBUMIN SERPL-MCNC: 4.8 G/DL (ref 3.5–5.2)
ALP BLD-CCNC: 92 U/L (ref 40–129)
ALT SERPL-CCNC: 57 U/L (ref 0–40)
AMPHETAMINE SCREEN, URINE: NOT DETECTED
ANION GAP SERPL CALCULATED.3IONS-SCNC: 15 MMOL/L (ref 7–16)
AST SERPL-CCNC: 35 U/L (ref 0–39)
BARBITURATE SCREEN URINE: NOT DETECTED
BASOPHILS ABSOLUTE: 0.08 E9/L (ref 0–0.2)
BASOPHILS RELATIVE PERCENT: 1.1 % (ref 0–2)
BENZODIAZEPINE SCREEN, URINE: NOT DETECTED
BETA-HYDROXYBUTYRATE: 0.66 MMOL/L (ref 0.02–0.27)
BILIRUB SERPL-MCNC: 0.4 MG/DL (ref 0–1.2)
BILIRUBIN URINE: NEGATIVE
BLOOD, URINE: NEGATIVE
BUN BLDV-MCNC: 19 MG/DL (ref 6–20)
CALCIUM SERPL-MCNC: 9.8 MG/DL (ref 8.6–10.2)
CANNABINOID SCREEN URINE: NOT DETECTED
CHLORIDE BLD-SCNC: 95 MMOL/L (ref 98–107)
CHP ED QC CHECK: NORMAL
CLARITY: CLEAR
CO2: 24 MMOL/L (ref 22–29)
COCAINE METABOLITE SCREEN URINE: POSITIVE
COLOR: YELLOW
CREAT SERPL-MCNC: 1 MG/DL (ref 0.7–1.2)
EOSINOPHILS ABSOLUTE: 0.13 E9/L (ref 0.05–0.5)
EOSINOPHILS RELATIVE PERCENT: 1.8 % (ref 0–6)
ETHANOL: <10 MG/DL (ref 0–0.08)
FENTANYL SCREEN, URINE: NOT DETECTED
GFR AFRICAN AMERICAN: >60
GFR NON-AFRICAN AMERICAN: >60 ML/MIN/1.73
GLUCOSE BLD-MCNC: 304 MG/DL
GLUCOSE BLD-MCNC: 711 MG/DL (ref 74–99)
GLUCOSE URINE: >=1000 MG/DL
HCT VFR BLD CALC: 44.6 % (ref 37–54)
HEMOGLOBIN: 14.5 G/DL (ref 12.5–16.5)
IMMATURE GRANULOCYTES #: 0.04 E9/L
IMMATURE GRANULOCYTES %: 0.5 % (ref 0–5)
KETONES, URINE: NEGATIVE MG/DL
LEUKOCYTE ESTERASE, URINE: NEGATIVE
LYMPHOCYTES ABSOLUTE: 2.38 E9/L (ref 1.5–4)
LYMPHOCYTES RELATIVE PERCENT: 32.6 % (ref 20–42)
Lab: ABNORMAL
MAGNESIUM: 2.2 MG/DL (ref 1.6–2.6)
MCH RBC QN AUTO: 28.9 PG (ref 26–35)
MCHC RBC AUTO-ENTMCNC: 32.5 % (ref 32–34.5)
MCV RBC AUTO: 89 FL (ref 80–99.9)
METER GLUCOSE: 304 MG/DL (ref 74–99)
METER GLUCOSE: >500 MG/DL (ref 74–99)
METHADONE SCREEN, URINE: NOT DETECTED
MONOCYTES ABSOLUTE: 0.78 E9/L (ref 0.1–0.95)
MONOCYTES RELATIVE PERCENT: 10.7 % (ref 2–12)
NEUTROPHILS ABSOLUTE: 3.9 E9/L (ref 1.8–7.3)
NEUTROPHILS RELATIVE PERCENT: 53.3 % (ref 43–80)
NITRITE, URINE: NEGATIVE
OPIATE SCREEN URINE: NOT DETECTED
OXYCODONE URINE: NOT DETECTED
PDW BLD-RTO: 13.7 FL (ref 11.5–15)
PH UA: 6 (ref 5–9)
PH VENOUS: 7.32 (ref 7.35–7.45)
PHENCYCLIDINE SCREEN URINE: NOT DETECTED
PLATELET # BLD: 374 E9/L (ref 130–450)
PMV BLD AUTO: 10.9 FL (ref 7–12)
POTASSIUM SERPL-SCNC: 4.5 MMOL/L (ref 3.5–5)
PROTEIN UA: NEGATIVE MG/DL
RBC # BLD: 5.01 E12/L (ref 3.8–5.8)
SALICYLATE, SERUM: <0.3 MG/DL (ref 0–30)
SODIUM BLD-SCNC: 134 MMOL/L (ref 132–146)
SPECIFIC GRAVITY UA: <=1.005 (ref 1–1.03)
TOTAL PROTEIN: 8.1 G/DL (ref 6.4–8.3)
TRICYCLIC ANTIDEPRESSANTS SCREEN SERUM: NEGATIVE NG/ML
TROPONIN: <0.01 NG/ML (ref 0–0.03)
UROBILINOGEN, URINE: 0.2 E.U./DL
WBC # BLD: 7.3 E9/L (ref 4.5–11.5)

## 2020-09-04 PROCEDURE — 80053 COMPREHEN METABOLIC PANEL: CPT

## 2020-09-04 PROCEDURE — 96366 THER/PROPH/DIAG IV INF ADDON: CPT

## 2020-09-04 PROCEDURE — 93005 ELECTROCARDIOGRAM TRACING: CPT | Performed by: NURSE PRACTITIONER

## 2020-09-04 PROCEDURE — 80307 DRUG TEST PRSMV CHEM ANLYZR: CPT

## 2020-09-04 PROCEDURE — 82962 GLUCOSE BLOOD TEST: CPT

## 2020-09-04 PROCEDURE — 81003 URINALYSIS AUTO W/O SCOPE: CPT

## 2020-09-04 PROCEDURE — 82010 KETONE BODYS QUAN: CPT

## 2020-09-04 PROCEDURE — 96365 THER/PROPH/DIAG IV INF INIT: CPT

## 2020-09-04 PROCEDURE — 96374 THER/PROPH/DIAG INJ IV PUSH: CPT

## 2020-09-04 PROCEDURE — 99282 EMERGENCY DEPT VISIT SF MDM: CPT

## 2020-09-04 PROCEDURE — 84484 ASSAY OF TROPONIN QUANT: CPT

## 2020-09-04 PROCEDURE — 6370000000 HC RX 637 (ALT 250 FOR IP): Performed by: NURSE PRACTITIONER

## 2020-09-04 PROCEDURE — 82800 BLOOD PH: CPT

## 2020-09-04 PROCEDURE — G0480 DRUG TEST DEF 1-7 CLASSES: HCPCS

## 2020-09-04 PROCEDURE — 85025 COMPLETE CBC W/AUTO DIFF WBC: CPT

## 2020-09-04 PROCEDURE — 71046 X-RAY EXAM CHEST 2 VIEWS: CPT

## 2020-09-04 PROCEDURE — 99283 EMERGENCY DEPT VISIT LOW MDM: CPT

## 2020-09-04 PROCEDURE — 2580000003 HC RX 258: Performed by: NURSE PRACTITIONER

## 2020-09-04 PROCEDURE — 83735 ASSAY OF MAGNESIUM: CPT

## 2020-09-04 RX ORDER — 0.9 % SODIUM CHLORIDE 0.9 %
1000 INTRAVENOUS SOLUTION INTRAVENOUS ONCE
Status: COMPLETED | OUTPATIENT
Start: 2020-09-04 | End: 2020-09-04

## 2020-09-04 RX ADMIN — INSULIN HUMAN 10 UNITS: 100 INJECTION, SOLUTION PARENTERAL at 22:00

## 2020-09-04 RX ADMIN — SODIUM CHLORIDE 1000 ML: 9 INJECTION, SOLUTION INTRAVENOUS at 20:49

## 2020-09-05 LAB
EKG ATRIAL RATE: 60 BPM
EKG P AXIS: 69 DEGREES
EKG P-R INTERVAL: 118 MS
EKG Q-T INTERVAL: 418 MS
EKG QRS DURATION: 106 MS
EKG QTC CALCULATION (BAZETT): 418 MS
EKG R AXIS: 67 DEGREES
EKG T AXIS: 66 DEGREES
EKG VENTRICULAR RATE: 60 BPM

## 2020-09-05 PROCEDURE — 93010 ELECTROCARDIOGRAM REPORT: CPT | Performed by: INTERNAL MEDICINE

## 2020-11-24 ENCOUNTER — TELEPHONE (OUTPATIENT)
Dept: ADMINISTRATIVE | Age: 45
End: 2020-11-24

## 2021-02-22 NOTE — ED PROVIDER NOTES
Addended by: MICH WEBER on: 2/22/2021 08:33 AM     Modules accepted: Orders     auscultation bilaterally, no wheezes, rales, or rhonchi. Not in respiratory distress  Cardiovascular:  Regular rate. Regular rhythm. No murmurs, gallops, or rubs. 2+ distal pulses  Chest: no chest wall tenderness  Abdomen: Soft. Non tender. Non distended. +BS. No rebound, guarding, or rigidity. No pulsatile masses appreciated. Musculoskeletal: Moves all extremities x 4. Warm and well perfused, no clubbing, cyanosis, or edema. Capillary refill <3 seconds  Skin: warm and dry. No rashes. Neurologic: GCS 15, CN 2-12 grossly intact, no focal deficits, symmetric strength 5/5 in the upper and lower extremities bilaterally  Psych: Normal Affect    -------------------------------------------------- RESULTS -------------------------------------------------  I have personally reviewed all laboratory and imaging results for this patient. Results are listed below.      LABS:  Results for orders placed or performed during the hospital encounter of 09/04/20   Troponin   Result Value Ref Range    Troponin <0.01 0.00 - 0.03 ng/mL   CBC Auto Differential   Result Value Ref Range    WBC 7.3 4.5 - 11.5 E9/L    RBC 5.01 3.80 - 5.80 E12/L    Hemoglobin 14.5 12.5 - 16.5 g/dL    Hematocrit 44.6 37.0 - 54.0 %    MCV 89.0 80.0 - 99.9 fL    MCH 28.9 26.0 - 35.0 pg    MCHC 32.5 32.0 - 34.5 %    RDW 13.7 11.5 - 15.0 fL    Platelets 322 540 - 574 E9/L    MPV 10.9 7.0 - 12.0 fL    Neutrophils % 53.3 43.0 - 80.0 %    Immature Granulocytes % 0.5 0.0 - 5.0 %    Lymphocytes % 32.6 20.0 - 42.0 %    Monocytes % 10.7 2.0 - 12.0 %    Eosinophils % 1.8 0.0 - 6.0 %    Basophils % 1.1 0.0 - 2.0 %    Neutrophils Absolute 3.90 1.80 - 7.30 E9/L    Immature Granulocytes # 0.04 E9/L    Lymphocytes Absolute 2.38 1.50 - 4.00 E9/L    Monocytes Absolute 0.78 0.10 - 0.95 E9/L    Eosinophils Absolute 0.13 0.05 - 0.50 E9/L    Basophils Absolute 0.08 0.00 - 0.20 E9/L   Comprehensive Metabolic Panel   Result Value Ref Range    Sodium 134 132 - 146 mmol/L Potassium 4.5 3.5 - 5.0 mmol/L    Chloride 95 (L) 98 - 107 mmol/L    CO2 24 22 - 29 mmol/L    Anion Gap 15 7 - 16 mmol/L    Glucose 711 (HH) 74 - 99 mg/dL    BUN 19 6 - 20 mg/dL    CREATININE 1.0 0.7 - 1.2 mg/dL    GFR Non-African American >60 >=60 mL/min/1.73    GFR African American >60     Calcium 9.8 8.6 - 10.2 mg/dL    Total Protein 8.1 6.4 - 8.3 g/dL    Alb 4.8 3.5 - 5.2 g/dL    Total Bilirubin 0.4 0.0 - 1.2 mg/dL    Alkaline Phosphatase 92 40 - 129 U/L    ALT 57 (H) 0 - 40 U/L    AST 35 0 - 39 U/L   Beta-Hydroxybutyrate   Result Value Ref Range    Beta-Hydroxybutyrate 0.66 (H) 0.02 - 0.27 mmol/L   Urinalysis   Result Value Ref Range    Color, UA Yellow Straw/Yellow    Clarity, UA Clear Clear    Glucose, Ur >=1000 (A) Negative mg/dL    Bilirubin Urine Negative Negative    Ketones, Urine Negative Negative mg/dL    Specific Gravity, UA <=1.005 1.005 - 1.030    Blood, Urine Negative Negative    pH, UA 6.0 5.0 - 9.0    Protein, UA Negative Negative mg/dL    Urobilinogen, Urine 0.2 <2.0 E.U./dL    Nitrite, Urine Negative Negative    Leukocyte Esterase, Urine Negative Negative   Urine Drug Screen   Result Value Ref Range    Amphetamine Screen, Urine NOT DETECTED Negative <1000 ng/mL    Barbiturate Screen, Ur NOT DETECTED Negative < 200 ng/mL    Benzodiazepine Screen, Urine NOT DETECTED Negative < 200 ng/mL    Cannabinoid Scrn, Ur NOT DETECTED Negative < 50ng/mL    Cocaine Metabolite Screen, Urine POSITIVE (A) Negative < 300 ng/mL    Opiate Scrn, Ur NOT DETECTED Negative < 300ng/mL    PCP Screen, Urine NOT DETECTED Negative < 25 ng/mL    Methadone Screen, Urine NOT DETECTED Negative <300 ng/mL    Oxycodone Urine NOT DETECTED Negative <100 ng/mL    FENTANYL SCREEN, URINE NOT DETECTED Negative <1 ng/mL    Drug Screen Comment: see below    Serum Drug Screen   Result Value Ref Range    Ethanol Lvl <10 mg/dL    Acetaminophen Level <5.0 (L) 10.0 - 58.7 mcg/mL    Salicylate, Serum <4.7 0.0 - 30.0 mg/dL    TCA Scrn NEGATIVE Cutoff:300 ng/mL   Magnesium   Result Value Ref Range    Magnesium 2.2 1.6 - 2.6 mg/dL   PH, VENOUS   Result Value Ref Range    pH, Wayne 7.32 (L) 7.35 - 7.45   POCT Glucose   Result Value Ref Range    Meter Glucose >500 (H) 74 - 99 mg/dL   POCT Glucose   Result Value Ref Range    Glucose 304 mg/dL    QC OK? ok    POCT Glucose   Result Value Ref Range    Meter Glucose 304 (H) 74 - 99 mg/dL   EKG 12 Lead   Result Value Ref Range    Ventricular Rate 60 BPM    Atrial Rate 60 BPM    P-R Interval 118 ms    QRS Duration 106 ms    Q-T Interval 418 ms    QTc Calculation (Bazett) 418 ms    P Axis 69 degrees    R Axis 67 degrees    T Axis 66 degrees       RADIOLOGY:  Interpreted by Radiologist.  XR CHEST (2 VW)   Final Result      Negative two-view chest.            EKG Interpretation  Interpreted by emergency department physician    Rhythm: normal sinus  and sinus arrhythmia  Rate: normal  Axis: normal  Conduction: normal  ST Segments: no acute change  T Waves: no acute change    Clinical Impression: no acute changes  Comparison to prior EKG: stable as compared to patient's most recent EKG      ------------------------- NURSING NOTES AND VITALS REVIEWED ---------------------------   The nursing notes within the ED encounter and vital signs as below have been reviewed by myself. BP (!) 153/78   Pulse 87   Temp 97 °F (36.1 °C) (Skin)   Resp 16   Ht 5' 8\" (1.727 m)   Wt 165 lb (74.8 kg)   SpO2 97%   BMI 25.09 kg/m²   Oxygen Saturation Interpretation: Normal    The patients available past medical records and past encounters were reviewed. ------------------------------ ED COURSE/MEDICAL DECISION MAKING----------------------  Medications   0.9 % sodium chloride bolus (0 mLs Intravenous Stopped 9/4/20 2246)   insulin regular (HUMULIN R;NOVOLIN R) injection 10 Units (10 Units Intravenous Given 9/4/20 2200)             Medical Decision Making:    Patient was given 1 L of normal saline and 10 units of regular insulin. He has been sleeping comfortably in the bed. Lab review glucose improved down to 304. No evidence of ketoacidosis. Normal anion gap noted. Potassium is normal.  Plan will be for discharge to home with instructions to take all of his insulin as well as his antihypertensive medications and antiviral medications upon returning home. Recommend following up with the primary care physician if any change or worsening symptoms to return back to the emergency room. Re-Evaluations:             Re-evaluation. Patients symptoms are improving      Consultations:                 Critical Care: This patient's ED course included: a personal history and physicial examination, re-evaluation prior to disposition, multiple bedside re-evaluations and a personal history and physicial eaxmination    This patient has remained hemodynamically stable and improved during their ED course. Counseling: The emergency provider has spoken with the patient and discussed todays results, in addition to providing specific details for the plan of care and counseling regarding the diagnosis and prognosis. Questions are answered at this time and they are agreeable with the plan.       --------------------------------- IMPRESSION AND DISPOSITION ---------------------------------    IMPRESSION  1. Hyperglycemia        DISPOSITION  Disposition: Discharge to home  Patient condition is good        NOTE: This report was transcribed using voice recognition software.  Every effort was made to ensure accuracy; however, inadvertent computerized transcription errors may be present         Pama Prader, APRN - CNP  09/05/20 0159

## 2021-07-09 ENCOUNTER — TELEPHONE (OUTPATIENT)
Dept: INTERNAL MEDICINE | Age: 46
End: 2021-07-09

## 2021-07-09 RX ORDER — SYRINGE-NEEDLE,INSULIN,0.5 ML 28 GAUGE
SYRINGE, EMPTY DISPOSABLE MISCELLANEOUS
Qty: 100 EACH | Refills: 0 | OUTPATIENT
Start: 2021-07-09

## 2021-08-03 RX ORDER — SYRINGE-NEEDLE,INSULIN,0.5 ML 28 GAUGE
SYRINGE, EMPTY DISPOSABLE MISCELLANEOUS
Qty: 100 EACH | Refills: 0 | OUTPATIENT
Start: 2021-08-03

## 2021-10-07 ENCOUNTER — APPOINTMENT (OUTPATIENT)
Dept: GENERAL RADIOLOGY | Age: 46
DRG: 137 | End: 2021-10-07
Payer: COMMERCIAL

## 2021-10-07 ENCOUNTER — HOSPITAL ENCOUNTER (INPATIENT)
Age: 46
LOS: 2 days | Discharge: HOME | DRG: 137 | End: 2021-10-09
Attending: EMERGENCY MEDICINE | Admitting: FAMILY MEDICINE
Payer: COMMERCIAL

## 2021-10-07 DIAGNOSIS — Z79.4 TYPE 2 DIABETES MELLITUS WITH HYPERGLYCEMIA, WITH LONG-TERM CURRENT USE OF INSULIN (HCC): ICD-10-CM

## 2021-10-07 DIAGNOSIS — U07.1 COVID-19 VIRUS INFECTION: Primary | ICD-10-CM

## 2021-10-07 DIAGNOSIS — J18.9 PNEUMONIA OF RIGHT LOWER LOBE DUE TO INFECTIOUS ORGANISM: ICD-10-CM

## 2021-10-07 DIAGNOSIS — E11.65 TYPE 2 DIABETES MELLITUS WITH HYPERGLYCEMIA, WITH LONG-TERM CURRENT USE OF INSULIN (HCC): ICD-10-CM

## 2021-10-07 DIAGNOSIS — E87.1 HYPONATREMIA: ICD-10-CM

## 2021-10-07 DIAGNOSIS — Z91.14 NONCOMPLIANCE WITH MEDICATION REGIMEN: ICD-10-CM

## 2021-10-07 DIAGNOSIS — R73.9 HYPERGLYCEMIA: ICD-10-CM

## 2021-10-07 DIAGNOSIS — B20 HUMAN IMMUNODEFICIENCY VIRUS (HIV) DISEASE (HCC): ICD-10-CM

## 2021-10-07 LAB
ALBUMIN SERPL-MCNC: 3.1 G/DL (ref 3.5–5.2)
ALP BLD-CCNC: 83 U/L (ref 40–129)
ALT SERPL-CCNC: 32 U/L (ref 0–40)
ANION GAP SERPL CALCULATED.3IONS-SCNC: 16 MMOL/L (ref 7–16)
ANISOCYTOSIS: ABNORMAL
AST SERPL-CCNC: 41 U/L (ref 0–39)
BACTERIA: ABNORMAL /HPF
BASOPHILS ABSOLUTE: 0 E9/L (ref 0–0.2)
BASOPHILS RELATIVE PERCENT: 0 % (ref 0–2)
BETA-HYDROXYBUTYRATE: 2.06 MMOL/L (ref 0.02–0.27)
BILIRUB SERPL-MCNC: 0.7 MG/DL (ref 0–1.2)
BILIRUBIN DIRECT: 0.2 MG/DL (ref 0–0.3)
BILIRUBIN URINE: ABNORMAL
BILIRUBIN, INDIRECT: 0.5 MG/DL (ref 0–1)
BLOOD, URINE: ABNORMAL
BUN BLDV-MCNC: 14 MG/DL (ref 6–20)
CALCIUM SERPL-MCNC: 9.3 MG/DL (ref 8.6–10.2)
CHLORIDE BLD-SCNC: 89 MMOL/L (ref 98–107)
CLARITY: CLEAR
CO2: 21 MMOL/L (ref 22–29)
COLOR: ABNORMAL
CREAT SERPL-MCNC: 0.9 MG/DL (ref 0.7–1.2)
EKG ATRIAL RATE: 102 BPM
EKG P AXIS: 80 DEGREES
EKG P-R INTERVAL: 112 MS
EKG Q-T INTERVAL: 350 MS
EKG QRS DURATION: 98 MS
EKG QTC CALCULATION (BAZETT): 456 MS
EKG R AXIS: 80 DEGREES
EKG T AXIS: 63 DEGREES
EKG VENTRICULAR RATE: 102 BPM
EOSINOPHILS ABSOLUTE: 0 E9/L (ref 0.05–0.5)
EOSINOPHILS RELATIVE PERCENT: 0 % (ref 0–6)
GFR AFRICAN AMERICAN: >60
GFR NON-AFRICAN AMERICAN: >60 ML/MIN/1.73
GLUCOSE BLD-MCNC: 214 MG/DL (ref 74–99)
GLUCOSE URINE: 500 MG/DL
HCT VFR BLD CALC: 54.3 % (ref 37–54)
HEMOGLOBIN: 17.2 G/DL (ref 12.5–16.5)
KETONES, URINE: 40 MG/DL
LACTATE DEHYDROGENASE: 308 U/L (ref 135–225)
LACTIC ACID, SEPSIS: 1.4 MMOL/L (ref 0.5–1.9)
LEUKOCYTE ESTERASE, URINE: NEGATIVE
LIPASE: 31 U/L (ref 13–60)
LYMPHOCYTES ABSOLUTE: 2 E9/L (ref 1.5–4)
LYMPHOCYTES RELATIVE PERCENT: 25.6 % (ref 20–42)
MCH RBC QN AUTO: 26.9 PG (ref 26–35)
MCHC RBC AUTO-ENTMCNC: 31.7 % (ref 32–34.5)
MCV RBC AUTO: 84.8 FL (ref 80–99.9)
METER GLUCOSE: 241 MG/DL (ref 74–99)
MONOCYTES ABSOLUTE: 0.85 E9/L (ref 0.1–0.95)
MONOCYTES RELATIVE PERCENT: 11.1 % (ref 2–12)
NEUTROPHILS ABSOLUTE: 4.85 E9/L (ref 1.8–7.3)
NEUTROPHILS RELATIVE PERCENT: 63.3 % (ref 43–80)
NITRITE, URINE: NEGATIVE
NUCLEATED RED BLOOD CELLS: 0 /100 WBC
PDW BLD-RTO: 13.7 FL (ref 11.5–15)
PH UA: 6 (ref 5–9)
PH VENOUS: 7.47 (ref 7.35–7.45)
PLATELET # BLD: 313 E9/L (ref 130–450)
PMV BLD AUTO: 11.1 FL (ref 7–12)
POIKILOCYTES: ABNORMAL
POTASSIUM REFLEX MAGNESIUM: 4.5 MMOL/L (ref 3.5–5)
PROCALCITONIN: 0.16 NG/ML (ref 0–0.08)
PROTEIN UA: 100 MG/DL
RAPID HIV 1&2: ABNORMAL
RBC # BLD: 6.4 E12/L (ref 3.8–5.8)
RBC UA: ABNORMAL /HPF (ref 0–2)
SARS-COV-2, NAAT: DETECTED
SODIUM BLD-SCNC: 126 MMOL/L (ref 132–146)
SPECIFIC GRAVITY UA: >=1.03 (ref 1–1.03)
TARGET CELLS: ABNORMAL
TOTAL PROTEIN: 9.4 G/DL (ref 6.4–8.3)
UROBILINOGEN, URINE: 1 E.U./DL
WBC # BLD: 7.7 E9/L (ref 4.5–11.5)
WBC UA: ABNORMAL /HPF (ref 0–5)

## 2021-10-07 PROCEDURE — 71045 X-RAY EXAM CHEST 1 VIEW: CPT

## 2021-10-07 PROCEDURE — 2580000003 HC RX 258: Performed by: FAMILY MEDICINE

## 2021-10-07 PROCEDURE — 2500000003 HC RX 250 WO HCPCS: Performed by: FAMILY MEDICINE

## 2021-10-07 PROCEDURE — 6360000002 HC RX W HCPCS: Performed by: FAMILY MEDICINE

## 2021-10-07 PROCEDURE — 93010 ELECTROCARDIOGRAM REPORT: CPT | Performed by: INTERNAL MEDICINE

## 2021-10-07 PROCEDURE — 83615 LACTATE (LD) (LDH) ENZYME: CPT

## 2021-10-07 PROCEDURE — 87040 BLOOD CULTURE FOR BACTERIA: CPT

## 2021-10-07 PROCEDURE — 2580000003 HC RX 258: Performed by: EMERGENCY MEDICINE

## 2021-10-07 PROCEDURE — 80076 HEPATIC FUNCTION PANEL: CPT

## 2021-10-07 PROCEDURE — 6370000000 HC RX 637 (ALT 250 FOR IP): Performed by: FAMILY MEDICINE

## 2021-10-07 PROCEDURE — 81001 URINALYSIS AUTO W/SCOPE: CPT

## 2021-10-07 PROCEDURE — 80048 BASIC METABOLIC PNL TOTAL CA: CPT

## 2021-10-07 PROCEDURE — 2060000000 HC ICU INTERMEDIATE R&B

## 2021-10-07 PROCEDURE — 84145 PROCALCITONIN (PCT): CPT

## 2021-10-07 PROCEDURE — 2500000003 HC RX 250 WO HCPCS: Performed by: EMERGENCY MEDICINE

## 2021-10-07 PROCEDURE — 6360000002 HC RX W HCPCS: Performed by: EMERGENCY MEDICINE

## 2021-10-07 PROCEDURE — 96375 TX/PRO/DX INJ NEW DRUG ADDON: CPT

## 2021-10-07 PROCEDURE — 93005 ELECTROCARDIOGRAM TRACING: CPT | Performed by: EMERGENCY MEDICINE

## 2021-10-07 PROCEDURE — 83690 ASSAY OF LIPASE: CPT

## 2021-10-07 PROCEDURE — 83605 ASSAY OF LACTIC ACID: CPT

## 2021-10-07 PROCEDURE — 99282 EMERGENCY DEPT VISIT SF MDM: CPT

## 2021-10-07 PROCEDURE — 82800 BLOOD PH: CPT

## 2021-10-07 PROCEDURE — 85025 COMPLETE CBC W/AUTO DIFF WBC: CPT

## 2021-10-07 PROCEDURE — 87635 SARS-COV-2 COVID-19 AMP PRB: CPT

## 2021-10-07 PROCEDURE — 82962 GLUCOSE BLOOD TEST: CPT

## 2021-10-07 PROCEDURE — 96361 HYDRATE IV INFUSION ADD-ON: CPT

## 2021-10-07 PROCEDURE — 99223 1ST HOSP IP/OBS HIGH 75: CPT | Performed by: FAMILY MEDICINE

## 2021-10-07 PROCEDURE — 82010 KETONE BODYS QUAN: CPT

## 2021-10-07 PROCEDURE — 87088 URINE BACTERIA CULTURE: CPT

## 2021-10-07 PROCEDURE — 96365 THER/PROPH/DIAG IV INF INIT: CPT

## 2021-10-07 RX ORDER — ALBUTEROL SULFATE 90 UG/1
2 AEROSOL, METERED RESPIRATORY (INHALATION) EVERY 6 HOURS PRN
Status: DISCONTINUED | OUTPATIENT
Start: 2021-10-07 | End: 2021-10-09 | Stop reason: HOSPADM

## 2021-10-07 RX ORDER — LISINOPRIL 20 MG/1
40 TABLET ORAL DAILY
Status: DISCONTINUED | OUTPATIENT
Start: 2021-10-07 | End: 2021-10-09 | Stop reason: HOSPADM

## 2021-10-07 RX ORDER — ZINC SULFATE 50(220)MG
50 CAPSULE ORAL DAILY
Status: DISCONTINUED | OUTPATIENT
Start: 2021-10-07 | End: 2021-10-09 | Stop reason: HOSPADM

## 2021-10-07 RX ORDER — ACETAMINOPHEN 325 MG/1
650 TABLET ORAL EVERY 6 HOURS PRN
Status: DISCONTINUED | OUTPATIENT
Start: 2021-10-07 | End: 2021-10-09 | Stop reason: HOSPADM

## 2021-10-07 RX ORDER — VITAMIN B COMPLEX
1000 TABLET ORAL DAILY
Status: DISCONTINUED | OUTPATIENT
Start: 2021-10-07 | End: 2021-10-08 | Stop reason: DRUGHIGH

## 2021-10-07 RX ORDER — ASCORBIC ACID 500 MG
500 TABLET ORAL DAILY
Status: DISCONTINUED | OUTPATIENT
Start: 2021-10-07 | End: 2021-10-09 | Stop reason: HOSPADM

## 2021-10-07 RX ORDER — 0.9 % SODIUM CHLORIDE 0.9 %
1000 INTRAVENOUS SOLUTION INTRAVENOUS ONCE
Status: COMPLETED | OUTPATIENT
Start: 2021-10-07 | End: 2021-10-07

## 2021-10-07 RX ORDER — SODIUM CHLORIDE 0.9 % (FLUSH) 0.9 %
5-40 SYRINGE (ML) INJECTION EVERY 12 HOURS SCHEDULED
Status: DISCONTINUED | OUTPATIENT
Start: 2021-10-07 | End: 2021-10-09 | Stop reason: HOSPADM

## 2021-10-07 RX ORDER — ONDANSETRON 2 MG/ML
4 INJECTION INTRAMUSCULAR; INTRAVENOUS EVERY 6 HOURS PRN
Status: DISCONTINUED | OUTPATIENT
Start: 2021-10-07 | End: 2021-10-09 | Stop reason: HOSPADM

## 2021-10-07 RX ORDER — LANOLIN ALCOHOL/MO/W.PET/CERES
100 CREAM (GRAM) TOPICAL DAILY
Status: DISCONTINUED | OUTPATIENT
Start: 2021-10-07 | End: 2021-10-09 | Stop reason: HOSPADM

## 2021-10-07 RX ORDER — FOLIC ACID 1 MG/1
1 TABLET ORAL DAILY
Status: DISCONTINUED | OUTPATIENT
Start: 2021-10-07 | End: 2021-10-09 | Stop reason: HOSPADM

## 2021-10-07 RX ORDER — INSULIN GLARGINE 100 [IU]/ML
20 INJECTION, SOLUTION SUBCUTANEOUS NIGHTLY
Status: DISCONTINUED | OUTPATIENT
Start: 2021-10-07 | End: 2021-10-09

## 2021-10-07 RX ORDER — MONTELUKAST SODIUM 10 MG/1
10 TABLET ORAL NIGHTLY
Status: DISCONTINUED | OUTPATIENT
Start: 2021-10-07 | End: 2021-10-09 | Stop reason: HOSPADM

## 2021-10-07 RX ORDER — CITALOPRAM 20 MG/1
20 TABLET ORAL DAILY
Status: DISCONTINUED | OUTPATIENT
Start: 2021-10-07 | End: 2021-10-09 | Stop reason: HOSPADM

## 2021-10-07 RX ORDER — VITAMIN B COMPLEX
2000 TABLET ORAL DAILY
Status: DISCONTINUED | OUTPATIENT
Start: 2021-10-07 | End: 2021-10-09 | Stop reason: HOSPADM

## 2021-10-07 RX ORDER — SULFAMETHOXAZOLE AND TRIMETHOPRIM 80; 16 MG/ML; MG/ML
320 INJECTION INTRAVENOUS 2 TIMES DAILY
Status: DISCONTINUED | OUTPATIENT
Start: 2021-10-07 | End: 2021-10-07 | Stop reason: SDUPTHER

## 2021-10-07 RX ORDER — SULFAMETHOXAZOLE AND TRIMETHOPRIM 80; 16 MG/ML; MG/ML
320 INJECTION INTRAVENOUS EVERY 12 HOURS
Status: DISCONTINUED | OUTPATIENT
Start: 2021-10-08 | End: 2021-10-07 | Stop reason: CLARIF

## 2021-10-07 RX ORDER — LANOLIN ALCOHOL/MO/W.PET/CERES
3 CREAM (GRAM) TOPICAL NIGHTLY PRN
Status: DISCONTINUED | OUTPATIENT
Start: 2021-10-07 | End: 2021-10-09 | Stop reason: HOSPADM

## 2021-10-07 RX ORDER — ACETAMINOPHEN 650 MG/1
650 SUPPOSITORY RECTAL EVERY 6 HOURS PRN
Status: DISCONTINUED | OUTPATIENT
Start: 2021-10-07 | End: 2021-10-09 | Stop reason: HOSPADM

## 2021-10-07 RX ORDER — SODIUM CHLORIDE 0.9 % (FLUSH) 0.9 %
5-40 SYRINGE (ML) INJECTION PRN
Status: DISCONTINUED | OUTPATIENT
Start: 2021-10-07 | End: 2021-10-09 | Stop reason: HOSPADM

## 2021-10-07 RX ORDER — SODIUM CHLORIDE 9 MG/ML
25 INJECTION, SOLUTION INTRAVENOUS PRN
Status: DISCONTINUED | OUTPATIENT
Start: 2021-10-07 | End: 2021-10-09 | Stop reason: HOSPADM

## 2021-10-07 RX ORDER — GUAIFENESIN/DEXTROMETHORPHAN 100-10MG/5
5 SYRUP ORAL EVERY 4 HOURS PRN
Status: DISCONTINUED | OUTPATIENT
Start: 2021-10-07 | End: 2021-10-09 | Stop reason: HOSPADM

## 2021-10-07 RX ORDER — ONDANSETRON 4 MG/1
4 TABLET, ORALLY DISINTEGRATING ORAL EVERY 8 HOURS PRN
Status: DISCONTINUED | OUTPATIENT
Start: 2021-10-07 | End: 2021-10-09 | Stop reason: HOSPADM

## 2021-10-07 RX ORDER — POLYETHYLENE GLYCOL 3350 17 G/17G
17 POWDER, FOR SOLUTION ORAL DAILY PRN
Status: DISCONTINUED | OUTPATIENT
Start: 2021-10-07 | End: 2021-10-09 | Stop reason: HOSPADM

## 2021-10-07 RX ORDER — DEXAMETHASONE SODIUM PHOSPHATE 10 MG/ML
6 INJECTION INTRAMUSCULAR; INTRAVENOUS EVERY 24 HOURS
Status: DISCONTINUED | OUTPATIENT
Start: 2021-10-07 | End: 2021-10-09 | Stop reason: HOSPADM

## 2021-10-07 RX ADMIN — SULFAMETHOXAZOLE AND TRIMETHOPRIM 320 MG: 80; 16 INJECTION, SOLUTION, CONCENTRATE INTRAVENOUS at 14:49

## 2021-10-07 RX ADMIN — Medication 10 ML: at 21:25

## 2021-10-07 RX ADMIN — DEXAMETHASONE SODIUM PHOSPHATE 6 MG: 10 INJECTION INTRAMUSCULAR; INTRAVENOUS at 21:25

## 2021-10-07 RX ADMIN — AZITHROMYCIN 500 MG: 500 INJECTION, POWDER, LYOPHILIZED, FOR SOLUTION INTRAVENOUS at 12:15

## 2021-10-07 RX ADMIN — FOLIC ACID 1 MG: 1 TABLET ORAL at 21:25

## 2021-10-07 RX ADMIN — MONTELUKAST SODIUM 10 MG: 10 TABLET, FILM COATED ORAL at 21:25

## 2021-10-07 RX ADMIN — SODIUM CHLORIDE 1000 ML: 9 INJECTION, SOLUTION INTRAVENOUS at 11:00

## 2021-10-07 RX ADMIN — ZINC SULFATE 220 MG (50 MG) CAPSULE 50 MG: CAPSULE at 21:45

## 2021-10-07 RX ADMIN — Medication 2000 UNITS: at 21:25

## 2021-10-07 RX ADMIN — Medication 100 MG: at 21:25

## 2021-10-07 RX ADMIN — CITALOPRAM HYDROBROMIDE 20 MG: 20 TABLET ORAL at 21:25

## 2021-10-07 RX ADMIN — ENOXAPARIN SODIUM 30 MG: 30 INJECTION, SOLUTION INTRAVENOUS; SUBCUTANEOUS at 21:25

## 2021-10-07 RX ADMIN — CEFTRIAXONE 1000 MG: 1 INJECTION, POWDER, FOR SOLUTION INTRAMUSCULAR; INTRAVENOUS at 12:15

## 2021-10-07 RX ADMIN — CHOLECALCIFEROL TAB 25 MCG (1000 UNIT) 1000 UNITS: 25 TAB at 21:45

## 2021-10-07 RX ADMIN — LISINOPRIL 40 MG: 20 TABLET ORAL at 21:25

## 2021-10-07 RX ADMIN — INSULIN GLARGINE 20 UNITS: 100 INJECTION, SOLUTION SUBCUTANEOUS at 21:25

## 2021-10-07 ASSESSMENT — PAIN DESCRIPTION - PAIN TYPE: TYPE: CHRONIC PAIN

## 2021-10-07 ASSESSMENT — PAIN DESCRIPTION - ORIENTATION: ORIENTATION: POSTERIOR

## 2021-10-07 ASSESSMENT — PAIN DESCRIPTION - LOCATION: LOCATION: BACK

## 2021-10-07 ASSESSMENT — PAIN SCALES - GENERAL: PAINLEVEL_OUTOF10: 10

## 2021-10-07 NOTE — H&P
[facility] History and Physical      CHIEF COMPLAINT:  SOB and weakness    History of Present Illness:  Patient is a 52 yo male with known HIV and T2DM who presented to the ED for worsening weakness and SOB. He admitted to being off his meds for about 5 months. He loss ~ 40 lbs. In the ED he was found with RLL pneumonia and COVID 19 infection. His Na+ was low; he admitted to drinking large beers daily. He is being admitted for further evaluation and treatment. He denies HA, dizziness, CP, palpitations, N, V.  He admits to diarrhea, cough and occasional phlegm production. He has pain in his back and legs. He took pills and insulin for his diabetes. He is a smoker for 33+ yrs. He reported smoking cocaine. He lives alone. Has no pets. He did not get the COVID 19 vaccine. Informant(s) for H&P:  Patient    REVIEW OF SYSTEMS:  A comprehensive review of systems was negative except for: what is in the HPI    PMH:  Past Medical History:   Diagnosis Date    Diabetes mellitus ([facility]     HIV (human immunodeficiency virus infection) ([facility]     Hypertension     Neuropathy        Surgical History:  No past surgical history on file. Medications Prior to Admission:    Prior to Admission medications    Medication Sig Start Date End Date Taking?  Authorizing Provider   metFORMIN (GLUCOPHAGE) 1000 MG tablet Take 1.5 tablets by mouth 2 times daily (with meals) 5/27/20 7/26/20  Noemi Chadwick MD   budesonide (PULMICORT) 0.5 MG/2ML nebulizer suspension Take 4 mLs by nebulization 2 times daily 5/27/20   Noemi Chadwick MD   ipratropium-albuterol (DUONEB) 0.5-2.5 (3) MG/3ML SOLN nebulizer solution Inhale 3 mLs into the lungs every 4 hours as needed for Shortness of Breath (wheezing) 5/27/20   Noemi Chadwick MD   Nebulizers (COMPRESSOR/NEBULIZER) MISC 1 Device by Does not apply route 2 times daily 5/27/20   Noemi Chadwick MD   folic acid (FOLVITE) 1 MG tablet Take 1 tablet by mouth daily 5/27/20 1/50/46  NICHOLAS Beltran CNP   citalopram (CELEXA) 20 MG tablet Take 1 tablet by mouth daily 5/27/20 5/52/68  NICHOLAS Beltran CNP   melatonin 3 MG TABS tablet Take 1 tablet by mouth nightly as needed (sleep) 5/27/20 3/26/54  NICHOLAS Beltran CNP   thiamine 100 MG tablet Take 1 tablet by mouth daily 5/27/20 0/64/96  NICHOLAS Vilchis CNP   insulin glargine (LANTUS) 100 UNIT/ML injection vial Inject 35 Units into the skin nightly 5/27/20   Caprice Diaz MD   Insulin Syringe-Needle U-100 25G X 1\" 1 ML MISC 1 Units by Does not apply route three times daily Use as directed 5/27/20   Caprice Diaz MD   lisinopril (PRINIVIL;ZESTRIL) 40 MG tablet Take 1 tablet by mouth daily 4/8/20   Liudmila Varela MD   blood glucose monitor strips Test 4x times a day & as needed for symptoms of irregular blood glucose. May substitute for insurance coverage 4/7/20   Liudmila Varela MD   Alcohol Swabs PADS 1 strip by Does not apply route 4 times daily (after meals and at bedtime) 4/7/20   Liudmila Varela MD   citalopram (CELEXA) 20 MG tablet Take 1 tablet by mouth daily for 14 days 4/8/20 9/04/01  NICHOLAS Beltran CNP   folic acid (FOLVITE) 1 MG tablet Take 1 tablet by mouth daily for 14 days 4/8/20 5/81/74  NICHOLAS Vilchis CNP   melatonin 3 MG TABS tablet Take 1 tablet by mouth nightly as needed (sleep) 4/7/20 4/06/43  NICHOLAS Vilchis CNP   Multiple Vitamin (MULTIVITAMIN) tablet Take 1 tablet by mouth daily for 14 days 4/8/20 1/03/24  NICHOLAS Vilchis CNP   emtricitabine-tenofovir alafenamide (DESCOVY) 200-25 MG TABS tablet Take 1 tablet by mouth daily    Historical Provider, MD   Atazanavir-Cobicistat (EVOTAZ) 300-150 MG TABS Take 1 tablet by mouth Daily    Historical Provider, MD   glucose monitoring kit (FREESTYLE) monitoring kit Check blood sugar once daily fasting.  May substitute brand for insurance coverage 4/6/20   Liudmila Varela MD Lancets MISC 1 each by Does not apply route 4 times daily 4/6/20   Clara Singh MD   Insulin Syringe-Needle U-100 25G X 1\" 1 ML MISC 1 each by Does not apply route 4 times daily (after meals and at bedtime) Use as directed May substitute for insurance coverage 4/6/20   Clara Singh MD   albuterol sulfate HFA (PROAIR HFA) 108 (90 Base) MCG/ACT inhaler Inhale 2 puffs into the lungs every 6 hours as needed for Wheezing 5/30/19 7/14/20  Celio Barry DO   Respiratory Therapy Supplies (NEBULIZER/TUBING/MOUTHPIECE) KIT 1 kit by Does not apply route daily as needed (hx of asthma) 5/30/19   Celio Barry DO   REYATAZ 300 MG capsule  11/18/14   Historical Provider, MD   TRUVADA 200-300 MG per tablet  11/18/14   Historical Provider, MD   NORVIR 100 MG capsule  11/18/14   Historical Provider, MD   montelukast (SINGULAIR) 10 MG tablet Take 1 tablet by mouth nightly. 6/2/14   Marci Cowan MD       Allergies:    Bee venom, Ibuprofen, and Nutritional supplements    Social History:    reports that he has been smoking cigarettes. He has a 40.00 pack-year smoking history. He has never used smokeless tobacco. He reports current alcohol use. He reports current drug use. Drugs: Marijuana and Cocaine. Family History:   family history includes Diabetes in his mother; Prostate Cancer in his father. PHYSICAL EXAM:  Vitals:  /78   Pulse 102   Temp 98.7 °F (37.1 °C)   Resp 16   Ht 5' 7\" (1.702 m)   Wt 120 lb (54.4 kg)   SpO2 95%   BMI 18.79 kg/m²     General Appearance: alert and oriented to person, place and time and in no acute distress.   He is cachetic  Skin: warm and dry  Head: normocephalic and atraumatic  Eyes: pupils equal, round, and reactive to light, extraocular eye movements intact, conjunctivae normal  Neck: neck supple and non tender without mass   Pulmonary/Chest: clear to auscultation bilaterally- no wheezes, rales or rhonchi, normal air movement, no respiratory distress  Cardiovascular: normal rate, normal S1 and S2 and no carotid bruits  Abdomen: soft, non-tender, non-distended, normal bowel sounds, no masses or organomegaly  Extremities: no cyanosis, no clubbing and no edema  Neurologic: no cranial nerve deficit and speech normal    LABS:  Recent Labs     10/07/21  1042   *   K 4.5   CL 89*   CO2 21*   BUN 14   CREATININE 0.9   GLUCOSE 214*   CALCIUM 9.3       Recent Labs     10/07/21  1042   WBC 7.7   RBC 6.40*   HGB 17.2*   HCT 54.3*   MCV 84.8   MCH 26.9   MCHC 31.7*   RDW 13.7      MPV 11.1     Radiology:   XR CHEST PORTABLE   Final Result   New focal interstitial opacities located in right lower lung field could   suggest pneumonia or subsegmental atelectasis. Short-term follow-up may be   helpful for further evaluation. EKG: Sinus tachycardia  Right atrial enlargement  Voltage criteria for left ventricular hypertrophy  Nonspecific T wave abnormality  Abnormal ECG  No previous ECGs available  Confirmed by Rebecca Ogden ([id number]) on 10/7/2021 1:08:24 PM     ASSESSMENT:      Active Problems:    Pneumonia  Resolved Problems:    * No resolved hospital problems. *    PLAN:  1.   RLL pneumonia - Coverage for CAP and Pjiroveri. ID consult for HIV therapy. Legionella and Strept Ag on urine ordered. Oxygen support. 2.  COVID 19 pneumonia - patient is not hypoxic; supportive care. Vitamin C, D and Zinc ordered. 3.  Hyponatremia/Beer Potomania - trend labs; urine studies; fluid restriction to 15 ml.  4. HTN - resume Lisinopril. Monitor vitals and treat accordingly. 5.  T2DM - Glucose POCT. Hypoglycemia protocol. Check for HgbA1C. Hold Metformin. SS insulin scale for meals and hs. Resume Lantus. Code Status:   FULL  DVT prophylaxis: Lovenox    NOTE: This report was transcribed using voice recognition software. Every effort was made to ensure accuracy; however, inadvertent computerized transcription errors may be present.     Electronically signed by Sudhakar Lake

## 2021-10-07 NOTE — ED PROVIDER NOTES
Department of Emergency Medicine   ED  Provider Note  Admit Date/RoomTime: 10/7/2021  9:59 AM  ED Room: 01/01          History of Present Illness:  10/7/21, Time: 10:04 AM EDT  No chief complaint on file. Cindy Caceres is a 51 y.o. male presenting to the ED for cough shortness of breath, weight loss and medication noncompliance. Patient states he is HIV positive and has not been taking his HIV medication, nor any of his other medication for over the past 5 months. Over the past 3-4 weeks he states he has lost over 40 pounds. He is continually feeling fatigued and weak. Over the past week or so he has had a cough with no fever. He states he feels somewhat short of breath but denies any chest pain or discomfort. There has been diarrhea but he denies any abdominal pain, no nausea or vomiting but patient states he is not eating well. Review of Systems:   Pertinent positives and negatives are stated within HPI, all other systems reviewed and are negative.        --------------------------------------------- PAST HISTORY ---------------------------------------------  Past Medical History:  has a past medical history of Diabetes mellitus ([facility] [address] [facility] Hypertension, and Neuropathy. Past Surgical History:  has no past surgical history on file. Social History:  reports that he has been smoking cigarettes. He has a 40.00 pack-year smoking history. He has never used smokeless tobacco. He reports current alcohol use. He reports current drug use. Drugs: Marijuana and Cocaine. Family History: family history includes Diabetes in his mother; Prostate Cancer in his father. . Unless otherwise noted, family history is non contributory    The patients home medications have been reviewed.     Allergies: Bee venom, Ibuprofen, and Nutritional supplements        ---------------------------------------------------PHYSICAL EXAM--------------------------------------    Constitutional/General: Alert and oriented x3  Head: Normocephalic and atraumatic  Eyes: PERRL, EOMI, sclera non icteric  Mouth: Oropharynx clear, handling secretions, no trismus, no asymmetry of the posterior oropharynx or uvular edema  Neck: Supple, full ROM, no stridor, no meningeal signs  Respiratory: Bilateral basilar rhonchi, no increased work of breathing  Cardiovascular:  Regular rate. Regular rhythm. No murmurs, no aortic murmurs, no gallops, or rubs. 2+ distal pulses. Equal extremity pulses. Chest: No chest wall tenderness  GI:  Abdomen Soft, Non tender, Non distended. No rebound, guarding, or rigidity. No pulsatile masses. Musculoskeletal: Moves all extremities x 4. Warm and well perfused, no clubbing, cyanosis, or edema. Capillary refill <3 seconds  Integument: skin warm and dry. No rashes. Neurologic: GCS 15, no focal deficits, symmetric strength 5/5 in the upper and lower extremities bilaterally  Psychiatric: Normal Affect          -------------------------------------------------- RESULTS -------------------------------------------------  I have personally reviewed all laboratory and imaging results for this patient. Results are listed below.      LABS: (Lab results interpreted by me)  Results for orders placed or performed during the hospital encounter of 10/07/21   COVID-19, Rapid   Result Value Ref Range    SARS-CoV-2, NAAT DETECTED (A) Not Detected   Lactate, Sepsis   Result Value Ref Range    Lactic Acid, Sepsis 1.4 0.5 - 1.9 mmol/L   CBC Auto Differential   Result Value Ref Range    WBC 7.7 4.5 - 11.5 E9/L    RBC 6.40 (H) 3.80 - 5.80 E12/L    Hemoglobin 17.2 (H) 12.5 - 16.5 g/dL    Hematocrit 54.3 (H) 37.0 - 54.0 %    MCV 84.8 80.0 - 99.9 fL    MCH 26.9 26.0 - 35.0 pg    MCHC 31.7 (L) 32.0 - 34.5 %    RDW 13.7 11.5 - 15.0 fL    Platelets 159 352 - 111 E9/L    MPV 11.1 7.0 - 12.0 fL   Basic Metabolic Panel w/ Reflex to MG   Result Value Ref Range Sodium 126 (L) 132 - 146 mmol/L    Potassium reflex Magnesium 4.5 3.5 - 5.0 mmol/L    Chloride 89 (L) 98 - 107 mmol/L    CO2 21 (L) 22 - 29 mmol/L    Anion Gap 16 7 - 16 mmol/L    Glucose 214 (H) 74 - 99 mg/dL    BUN 14 6 - 20 mg/dL    CREATININE 0.9 0.7 - 1.2 mg/dL    GFR Non-African American >60 >=60 mL/min/1.73    GFR African American >60     Calcium 9.3 8.6 - 10.2 mg/dL   Hepatic Function Panel   Result Value Ref Range    Total Protein 9.4 (H) 6.4 - 8.3 g/dL    Albumin 3.1 (L) 3.5 - 5.2 g/dL    Alkaline Phosphatase 83 40 - 129 U/L    ALT 32 0 - 40 U/L    AST 41 (H) 0 - 39 U/L    Total Bilirubin 0.7 0.0 - 1.2 mg/dL   Lipase   Result Value Ref Range    Lipase 31 13 - 60 U/L   PH, VENOUS   Result Value Ref Range    pH, Wayne 7.47 (H) 7.35 - 7.45   EKG 12 Lead   Result Value Ref Range    Ventricular Rate 102 BPM    Atrial Rate 102 BPM    P-R Interval 112 ms    QRS Duration 98 ms    Q-T Interval 350 ms    QTc Calculation (Bazett) 456 ms    P Axis 80 degrees    R Axis 80 degrees    T Axis 63 degrees   ,       RADIOLOGY:  Interpreted by Radiologist unless otherwise specified  XR CHEST PORTABLE   Final Result   New focal interstitial opacities located in right lower lung field could   suggest pneumonia or subsegmental atelectasis. Short-term follow-up may be   helpful for further evaluation.                EKG Interpretation  Interpreted by emergency department physician, Dr. Meño Boyce    Date of EKG: 10/7/21  Time: 1023    Rhythm: sinus tachycardia  Rate: 102  Axis: normal  Conduction: normal  ST Segments: no acute change  T Waves: non specific changes    Clinical Impression: Diffuse T wave flattening but no findings suggestive of acute ischemia or injury  Comparison to prior EKG: changes compared to previous EKG      ------------------------- NURSING NOTES AND VITALS REVIEWED ---------------------------   The nursing notes within the ED encounter and vital signs as below have been reviewed by silkeelf  /78   Pulse 102   Temp 98.7 °F (37.1 °C)   Resp 16   Ht 5' 7\" (1.702 m)   Wt 120 lb (54.4 kg)   SpO2 95%   BMI 18.79 kg/m²     Oxygen Saturation Interpretation: Normal    The patients available past medical records and past encounters were reviewed. ------------------------------ ED COURSE/MEDICAL DECISION MAKING----------------------  Medications   azithromycin (ZITHROMAX) 500 mg in D5W 250ml addavial (500 mg IntraVENous New Bag 10/7/21 1215)   sulfamethoxazole-trimethoprim (BACTRIM) 320 mg in dextrose 5 % 500 mL IVPB (has no administration in time range)   0.9 % sodium chloride bolus (1,000 mLs IntraVENous New Bag 10/7/21 1100)   cefTRIAXone (ROCEPHIN) 1,000 mg in sterile water 10 mL IV syringe (1,000 mg IntraVENous New Bag 10/7/21 1215)           The cardiac monitor revealed sinus tachycardia with a heart rate in the 100-110s as interpreted by me. The cardiac monitor was ordered secondary to the patient's chest pain and to monitor for patient for dysrhythmia. CPT 51299           Medical Decision Making:     I, Dr. Valeriano Jones, am the primary provider of record    79-year-old male, HIV positive, presenting with medication noncompliance and most recently a cough and shortness of breath. Concern for infection as he is likely immunocompromise. He has had over 40 pound weight loss over the past several weeks. Patient states he is supposed to be taking Truvada and \"a blue pill\" for his HIV, also states he has not taken his insulin as well. He arrives tachycardic, no hypoxia. EKG shows no signs of ischemia or injury. Patient appears to have right lower lobe infiltrative process, is also Covid positive. Would have suspicion for possible superimposed pneumonia with his Covid infection. He is hyponatremic at 126, blood sugars acceptable with no anion gap, no appearance of DKA. Patient was given IV fluids. He was also given Rocephin and azithromycin as well as Bactrim.   LDH and procalcitonin were added, infectious disease will be consulted. Admitted to hospitalist for further management. Re-Evaluations: This patient's ED course included: a personal history and physicial examination, re-evaluation prior to disposition, IV medications, cardiac monitoring and continuous pulse oximetry    This patient has remained hemodynamically stable and been closely monitored during their ED course. Counseling: The emergency provider has spoken with the patient and discussed todays results, in addition to providing specific details for the plan of care and counseling regarding the diagnosis and prognosis. Questions are answered at this time and they are agreeable with the plan.       --------------------------------- IMPRESSION AND DISPOSITION ---------------------------------    IMPRESSION  1. COVID-19 virus infection    2. Pneumonia of right lower lobe due to infectious organism    3. Hyponatremia    4. Hyperglycemia    5. Noncompliance with medication regimen        DISPOSITION  Disposition: Admit to telemetry  Patient condition is stable        NOTE: This report was transcribed using voice recognition software.  Every effort was made to ensure accuracy; however, inadvertent computerized transcription errors may be present        Bill Negrete DO  10/07/21 1217

## 2021-10-07 NOTE — ED NOTES
Bed: 01  Expected date:   Expected time:   Means of arrival:   Comments:  COVID check/weight loss     Mandi Hendricks RN  10/07/21 [id number]

## 2021-10-08 LAB
ADENOVIRUS BY PCR: NOT DETECTED
ALBUMIN SERPL-MCNC: 2.7 G/DL (ref 3.5–5.2)
ALP BLD-CCNC: 70 U/L (ref 40–129)
ALT SERPL-CCNC: 28 U/L (ref 0–40)
ANION GAP SERPL CALCULATED.3IONS-SCNC: 14 MMOL/L (ref 7–16)
AST SERPL-CCNC: 39 U/L (ref 0–39)
BASOPHILS ABSOLUTE: 0 E9/L (ref 0–0.2)
BASOPHILS RELATIVE PERCENT: 0 % (ref 0–2)
BILIRUB SERPL-MCNC: 0.4 MG/DL (ref 0–1.2)
BLASTS RELATIVE PERCENT: 0.9 % (ref 0–0)
BORDETELLA PARAPERTUSSIS BY PCR: NOT DETECTED
BORDETELLA PERTUSSIS BY PCR: NOT DETECTED
BUN BLDV-MCNC: 11 MG/DL (ref 6–20)
BURR CELLS: ABNORMAL
CALCIUM SERPL-MCNC: 8.8 MG/DL (ref 8.6–10.2)
CHLAMYDOPHILIA PNEUMONIAE BY PCR: NOT DETECTED
CHLORIDE BLD-SCNC: 96 MMOL/L (ref 98–107)
CO2: 18 MMOL/L (ref 22–29)
CORONAVIRUS 229E BY PCR: NOT DETECTED
CORONAVIRUS HKU1 BY PCR: NOT DETECTED
CORONAVIRUS NL63 BY PCR: NOT DETECTED
CORONAVIRUS OC43 BY PCR: NOT DETECTED
CREAT SERPL-MCNC: 0.8 MG/DL (ref 0.7–1.2)
D DIMER: 600 NG/ML DDU
EOSINOPHILS ABSOLUTE: 0 E9/L (ref 0.05–0.5)
EOSINOPHILS RELATIVE PERCENT: 0 % (ref 0–6)
FIBRINOGEN: >700 MG/DL (ref 225–540)
GFR AFRICAN AMERICAN: >60
GFR NON-AFRICAN AMERICAN: >60 ML/MIN/1.73
GLUCOSE BLD-MCNC: 301 MG/DL (ref 74–99)
HCT VFR BLD CALC: 46.5 % (ref 37–54)
HEMOGLOBIN: 15.3 G/DL (ref 12.5–16.5)
HEPATITIS B SURFACE ANTIGEN INTERPRETATION: NORMAL
HEPATITIS C ANTIBODY INTERPRETATION: NORMAL
HUMAN METAPNEUMOVIRUS BY PCR: NOT DETECTED
HUMAN RHINOVIRUS/ENTEROVIRUS BY PCR: NOT DETECTED
INFLUENZA A BY PCR: NOT DETECTED
INFLUENZA B BY PCR: NOT DETECTED
LYMPHOCYTES ABSOLUTE: 0.35 E9/L (ref 1.5–4)
LYMPHOCYTES RELATIVE PERCENT: 5.1 % (ref 20–42)
MCH RBC QN AUTO: 27.8 PG (ref 26–35)
MCHC RBC AUTO-ENTMCNC: 32.9 % (ref 32–34.5)
MCV RBC AUTO: 84.4 FL (ref 80–99.9)
METER GLUCOSE: 264 MG/DL (ref 74–99)
METER GLUCOSE: 338 MG/DL (ref 74–99)
MONOCYTES ABSOLUTE: 0.21 E9/L (ref 0.1–0.95)
MONOCYTES RELATIVE PERCENT: 2.6 % (ref 2–12)
MYCOPLASMA PNEUMONIAE BY PCR: NOT DETECTED
MYELOCYTE PERCENT: 1.7 % (ref 0–0)
NEUTROPHILS ABSOLUTE: 6.37 E9/L (ref 1.8–7.3)
NEUTROPHILS RELATIVE PERCENT: 89.7 % (ref 43–80)
NUCLEATED RED BLOOD CELLS: 0 /100 WBC
PARAINFLUENZA VIRUS 1 BY PCR: NOT DETECTED
PARAINFLUENZA VIRUS 2 BY PCR: NOT DETECTED
PARAINFLUENZA VIRUS 3 BY PCR: NOT DETECTED
PARAINFLUENZA VIRUS 4 BY PCR: NOT DETECTED
PDW BLD-RTO: 13.5 FL (ref 11.5–15)
PLATELET # BLD: 298 E9/L (ref 130–450)
PMV BLD AUTO: 10.7 FL (ref 7–12)
POIKILOCYTES: ABNORMAL
POTASSIUM REFLEX MAGNESIUM: 4.8 MMOL/L (ref 3.5–5)
PROCALCITONIN: 0.09 NG/ML (ref 0–0.08)
RBC # BLD: 5.51 E12/L (ref 3.8–5.8)
REASON FOR REJECTION: NORMAL
REJECTED TEST: NORMAL
RESPIRATORY SYNCYTIAL VIRUS BY PCR: NOT DETECTED
SARS-COV-2, PCR: DETECTED
SCHISTOCYTES: ABNORMAL
SODIUM BLD-SCNC: 128 MMOL/L (ref 132–146)
TOTAL PROTEIN: 8.3 G/DL (ref 6.4–8.3)
VITAMIN D 25-HYDROXY: 14 NG/ML (ref 30–100)
WBC # BLD: 7 E9/L (ref 4.5–11.5)

## 2021-10-08 PROCEDURE — 6360000002 HC RX W HCPCS: Performed by: FAMILY MEDICINE

## 2021-10-08 PROCEDURE — 82306 VITAMIN D 25 HYDROXY: CPT

## 2021-10-08 PROCEDURE — 0202U NFCT DS 22 TRGT SARS-COV-2: CPT

## 2021-10-08 PROCEDURE — 85384 FIBRINOGEN ACTIVITY: CPT

## 2021-10-08 PROCEDURE — 85025 COMPLETE CBC W/AUTO DIFF WBC: CPT

## 2021-10-08 PROCEDURE — 85378 FIBRIN DEGRADE SEMIQUANT: CPT

## 2021-10-08 PROCEDURE — 2500000003 HC RX 250 WO HCPCS: Performed by: FAMILY MEDICINE

## 2021-10-08 PROCEDURE — 84145 PROCALCITONIN (PCT): CPT

## 2021-10-08 PROCEDURE — 99233 SBSQ HOSP IP/OBS HIGH 50: CPT | Performed by: FAMILY MEDICINE

## 2021-10-08 PROCEDURE — 2580000003 HC RX 258: Performed by: EMERGENCY MEDICINE

## 2021-10-08 PROCEDURE — 82962 GLUCOSE BLOOD TEST: CPT

## 2021-10-08 PROCEDURE — 80053 COMPREHEN METABOLIC PANEL: CPT

## 2021-10-08 PROCEDURE — 36415 COLL VENOUS BLD VENIPUNCTURE: CPT

## 2021-10-08 PROCEDURE — 2060000000 HC ICU INTERMEDIATE R&B

## 2021-10-08 PROCEDURE — 6360000002 HC RX W HCPCS: Performed by: EMERGENCY MEDICINE

## 2021-10-08 PROCEDURE — 2580000003 HC RX 258: Performed by: FAMILY MEDICINE

## 2021-10-08 PROCEDURE — 6370000000 HC RX 637 (ALT 250 FOR IP): Performed by: FAMILY MEDICINE

## 2021-10-08 RX ORDER — BENZONATATE 100 MG/1
200 CAPSULE ORAL 3 TIMES DAILY
Status: DISCONTINUED | OUTPATIENT
Start: 2021-10-08 | End: 2021-10-09 | Stop reason: HOSPADM

## 2021-10-08 RX ORDER — ALBUTEROL SULFATE 90 UG/1
2 AEROSOL, METERED RESPIRATORY (INHALATION) EVERY 6 HOURS PRN
Status: DISCONTINUED | OUTPATIENT
Start: 2021-10-08 | End: 2021-10-09 | Stop reason: HOSPADM

## 2021-10-08 RX ADMIN — FOLIC ACID 1 MG: 1 TABLET ORAL at 10:22

## 2021-10-08 RX ADMIN — ENOXAPARIN SODIUM 30 MG: 30 INJECTION, SOLUTION INTRAVENOUS; SUBCUTANEOUS at 10:22

## 2021-10-08 RX ADMIN — Medication 10 ML: at 20:06

## 2021-10-08 RX ADMIN — OXYCODONE HYDROCHLORIDE AND ACETAMINOPHEN 500 MG: 500 TABLET ORAL at 10:22

## 2021-10-08 RX ADMIN — BENZONATATE 200 MG: 100 CAPSULE ORAL at 11:46

## 2021-10-08 RX ADMIN — MONTELUKAST SODIUM 10 MG: 10 TABLET, FILM COATED ORAL at 20:06

## 2021-10-08 RX ADMIN — CITALOPRAM HYDROBROMIDE 20 MG: 20 TABLET ORAL at 10:22

## 2021-10-08 RX ADMIN — Medication 100 MG: at 11:47

## 2021-10-08 RX ADMIN — SULFAMETHOXAZOLE AND TRIMETHOPRIM 320 MG: 80; 16 INJECTION, SOLUTION, CONCENTRATE INTRAVENOUS at 03:18

## 2021-10-08 RX ADMIN — Medication 10 ML: at 11:46

## 2021-10-08 RX ADMIN — CEFTRIAXONE 1000 MG: 1 INJECTION, POWDER, FOR SOLUTION INTRAMUSCULAR; INTRAVENOUS at 11:46

## 2021-10-08 RX ADMIN — INSULIN GLARGINE 20 UNITS: 100 INJECTION, SOLUTION SUBCUTANEOUS at 20:06

## 2021-10-08 RX ADMIN — BENZONATATE 200 MG: 100 CAPSULE ORAL at 20:05

## 2021-10-08 RX ADMIN — AZITHROMYCIN 500 MG: 500 INJECTION, POWDER, LYOPHILIZED, FOR SOLUTION INTRAVENOUS at 11:46

## 2021-10-08 RX ADMIN — ZINC SULFATE 220 MG (50 MG) CAPSULE 50 MG: CAPSULE at 11:46

## 2021-10-08 RX ADMIN — DEXAMETHASONE SODIUM PHOSPHATE 6 MG: 10 INJECTION INTRAMUSCULAR; INTRAVENOUS at 20:06

## 2021-10-08 RX ADMIN — LISINOPRIL 40 MG: 20 TABLET ORAL at 10:22

## 2021-10-08 RX ADMIN — Medication 2000 UNITS: at 11:46

## 2021-10-08 ASSESSMENT — PAIN SCALES - GENERAL
PAINLEVEL_OUTOF10: 0

## 2021-10-08 NOTE — CONSULTS
[address] [place] [address] [facility]  Consultation Note     Admit Date: 10/7/2021  9:59 AM    Reason for Consult:   Right lower lobe pneumonia. HIV positive. Off HIV medications x6 months    Attending Physician:  Margarita Narayan MD    HISTORY OF PRESENT ILLNESS:             The history is obtained from extensive review of available past medical records. The patient is a 51 y.o. male who is known to the ID service. The patient has a history of HIV infection and was being seen by Dr. Connie Barkley. He has been off his antiretrovirals for over 5 months. The patient presents to the ED at [facility] on 10/7/2021 complaining of shortness of breath. He reports weight loss. He tested positive for SARS-CoV-2. He was treated with Azithromycin and Ceftriaxone. Bactrim was started. Past Medical History:        Diagnosis Date    Diabetes mellitus ([facility] [address]     HIV (human immunodeficiency virus infection) ([facility] [address]     Hypertension     Neuropathy      May 2020. Admitted to [facility] with suicidal ideation. Seen by Dr. Connie Barkley for HIV infection. Patient was on Descovy and Eleanora Mitts. He had a missing several days of medications. March 2020. Admitted to [facility] with a drug overdose with cocaine. Seen by Dr. Connie Barkley to resume HIV antiretrovirals    January 2020. Admitted to [facility] with peritonsillar abscess and underwent incision and drainage. Treated with Unasyn. Seen by Dr. Connie Barkley. Unasyn was changed to Augmentin x10 days. He was too weak to Eleanora Mitts and Descovy    Past Surgical History:    History reviewed. No pertinent surgical history.   Current Medications:   Scheduled Meds:   benzonatate  200 mg Oral TID    azithromycin  500 mg IntraVENous Q24H    sodium chloride flush  5-40 mL IntraVENous 2 times per day    enoxaparin  30 mg SubCUTAneous BID    Vitamin D  2,000 Units Oral Daily    dexamethasone  6 mg IntraVENous Q24H    cefTRIAXone (ROCEPHIN) IV  1,000 mg Connections:     Frequency of Communication with Friends and Family:     Frequency of Social Gatherings with Friends and Family:     Attends Mandaen Services:     Active Member of Clubs or Organizations:     Attends Club or Organization Meetings:     Marital Status:    Intimate Partner Violence:     Fear of Current or Ex-Partner:     Emotionally Abused:     Physically Abused:     Sexually Abused:       Pets: None  Travel: No  The patient lives alone    Family History:       Problem Relation Age of Onset    Diabetes Mother     Prostate Cancer Father    . Otherwise non-pertinent to the chief complaint. REVIEW OF SYSTEMS:    Constitutional: Negative for fevers, chills, diaphoresis  Neurologic: Negative   Psychiatric: Negative  Rheumatologic: Negative   Endocrine: Negative  Hematologic: Negative  Immunologic: Negative  ENT: Negative  Respiratory: Negative   Cardiovascular: Negative  GI: Negative  : Negative  Musculoskeletal: Negative  Skin: No rashes. PHYSICAL EXAM:    Vitals:   /72   Pulse 80   Temp 98.6 °F (37 °C) (Oral)   Resp 16   Ht 5' 7\" (1.702 m)   Wt 120 lb (54.4 kg)   SpO2 94%   BMI 18.79 kg/m²   Constitutional: The patient is awake, alert, and oriented. No distress. He is slightly emaciated. Skin: Warm and dry. No rashes were noted. HEENT: Eyes show round, and reactive pupils. No jaundice. Moist mucous membranes, no ulcerations, no thrush. Neck: Supple to movements. No lymphadenopathy. Chest: No use of accessory muscles to breathe. Symmetrical expansion. Auscultation reveals no wheezing, crackles, or rhonchi. Cardiovascular: S1 and S2 are rhythmic and regular. No murmurs appreciated. Abdomen: Positive bowel sounds to auscultation. Benign to palpation. No masses felt. No hepatosplenomegaly. Extremities: No clubbing, no cyanosis, no edema.   Lines: peripheral      CBC+dif:  Recent Labs     10/07/21  1042 10/07/21  1042 10/08/21  0340   WBC 7.7  --  7.0   HGB 17.2* < > 15.3   HCT 54.3*   < > 46.5   MCV 84.8   < > 84.4      < > 298   NEUTROABS 4.85   < > 6.37    < > = values in this interval not displayed. Lab Results   Component Value Date    CRP 4.0 (H) 01/26/2020      No results found for: CRPHS  Lab Results   Component Value Date    SEDRATE 4 01/26/2020     Lab Results   Component Value Date    ALT 28 10/08/2021    AST 39 10/08/2021    ALKPHOS 70 10/08/2021    BILITOT 0.4 10/08/2021     Lab Results   Component Value Date     10/08/2021    K 4.8 10/08/2021    CL 96 10/08/2021    CO2 18 10/08/2021    BUN 11 10/08/2021    CREATININE 0.8 10/08/2021    GFRAA >60 10/08/2021    LABGLOM >60 10/08/2021    GLUCOSE 301 10/08/2021    GLUCOSE 112 11/25/2010    PROT 8.3 10/08/2021    LABALBU 2.7 10/08/2021    LABALBU 4.3 11/22/2010    CALCIUM 8.8 10/08/2021    BILITOT 0.4 10/08/2021    ALKPHOS 70 10/08/2021    AST 39 10/08/2021    ALT 28 10/08/2021       Lab Results   Component Value Date    PROTIME 9.1 05/22/2020    INR 0.8 05/22/2020       Lab Results   Component Value Date    TSH 2.680 04/08/2020       Lab Results   Component Value Date    COLORU MAYNOR 10/07/2021    PHUR 6.0 10/07/2021    WBCUA NONE 10/07/2021    RBCUA 0-1 10/07/2021    BACTERIA RARE 10/07/2021    CLARITYU Clear 10/07/2021    SPECGRAV >=1.030 10/07/2021    LEUKOCYTESUR Negative 10/07/2021    UROBILINOGEN 1.0 10/07/2021    BILIRUBINUR SMALL 10/07/2021    BLOODU SMALL 10/07/2021    GLUCOSEU 500 10/07/2021       No results found for: HCO3, BE, O2SAT, PH, THGB, PCO2, PO2, TCO2  Radiology:  Chest x-ray is unremarkable    Microbiology:  Pending  No results for input(s): BC in the last 72 hours. No results for input(s): ORG in the last 72 hours. No results for input(s): Concepción Puga in the last 72 hours. No results for input(s): STREPNEUMAGU in the last 72 hours. No results for input(s): LP1UAG in the last 72 hours. No results for input(s): ASO in the last 72 hours.   No results for input(s): CULTRESP in the last 72 hours. Recent Labs     10/07/21  1042   PROCAL 0.16*       Assessment:  · HIV infection, noncompliant with treatment  · SARS-CoV-2 infection with probable mild viral pneumonia. He is not requiring oxygen  · I do not think the patient has a community-acquired pneumonia or Pneumocystis jirovecii pneumonia    Plan:    · Antibiotics are not warranted. I have discontinued Ceftriaxone, Azithromycin and IV Bactrim  · The patient can be discharged from ID standpoint  · Will follow with you he is to follow-up with Dr. Miki Huggins at the [facility]    Thank you for having us see this patient in consultation. I will be discussing this case with the treating physicians.     Hetal Locke MD  12:11 PM  10/8/2021

## 2021-10-08 NOTE — CARE COORDINATION
Introduced my self and provided explanation of CM role to patient via phone. Patient COVID + 10/7 from home with SOB; currently not requiring any supplemental oxygen. Patient lives independently with 4 steps to enter home. PLAN IS HOME at discharge. No hx STEVE/ HHC. Patient has care source and states he in fact did stop taking his medications, however it was NOT a financial issue but yet patient's gf had passed and he was struggling mentally. Patient now agreeable to reinstate ART meds. Patient also does not have a PCP; info provided on discharge paperwork. Will follow along with  and assist with discharge planning as necessary.        Belgica Galeana, ANITRA  Case Management

## 2021-10-08 NOTE — ACP (ADVANCE CARE PLANNING)
Advance Care Planning     Advance Care Planning Activator (Inpatient)  Conversation Note      Date of ACP Conversation: 10/8/2021     Conversation Conducted with: Patient with Decision Making Capacity    ACP Activator: Belgica Galeana RN      Health Care Decision Maker:   Patient    Current Designated Health Care Decision Maker: Patient refusing to give an emergency contact      Care Preferences    Ventilation: \"If you were in your present state of health and suddenly became very ill and were unable to breathe on your own, what would your preference be about the use of a ventilator (breathing machine) if it were available to you? \"      Would the patient desire the use of ventilator (breathing machine)?: yes    \"If your health worsens and it becomes clear that your chance of recovery is unlikely, what would your preference be about the use of a ventilator (breathing machine) if it were available to you? \"     Would the patient desire the use of ventilator (breathing machine)?: \"dont know\"      Resuscitation  \"CPR works best to restart the heart when there is a sudden event, like a heart attack, in someone who is otherwise healthy. Unfortunately, CPR does not typically restart the heart for people who have serious health conditions or who are very sick. \"    \"In the event your heart stopped as a result of an underlying serious health condition, would you want attempts to be made to restart your heart (answer \"yes\" for attempt to resuscitate) or would you prefer a natural death (answer \"no\" for do not attempt to resuscitate)? \" yes       [] Yes   [] No   Educated Patient / Pantera Ace regarding differences between Advance Directives and portable DNR orders.     Length of ACP Conversation in minutes:      Conversation Outcomes:  [x] ACP discussion completed  [] Existing advance directive reviewed with patient; no changes to patient's previously recorded wishes  [] New Advance Directive completed  [] Portable Do Not Rescitate prepared for Provider review and signature  [] POLST/POST/MOLST/MOST prepared for Provider review and signature      Follow-up plan:    [] Schedule follow-up conversation to continue planning  [x] Referred individual to Provider for additional questions/concerns   [] Advised patient/agent/surrogate to review completed ACP document and update if needed with changes in condition, patient preferences or care setting    [] This note routed to one or more involved healthcare providers

## 2021-10-08 NOTE — PROGRESS NOTES
Physician Progress Note      Kishan Love  CSN #:                  [id number]  :                       1975  ADMIT DATE:       10/7/2021 9:59 AM  DISCH DATE:  RESPONDING  PROVIDER #:        Jyoti Cardenas MD          QUERY TEXT:    Dear Attending Physician,    Patient admitted with COVID 19 Pneumonia . Pt noted to have RLL Pneumonia. If   possible, please document in the progress notes and discharge summary if you   are evaluating and/or treating any of the following:    Note: CAP and HCAP indicate where the pneumonia was acquired, not a specific   type. The medical record reflects the following:  Risk Factors: COVID Pneumonia , HIV, 40 lb  loss  Clinical Indicators: Per H/P \" . Khadra Roach RLL pneumonia - Coverage for CAP and   Pjiroveri. .. COVID 19 pneumonia - patient is not hypoxic; supportive care. Vitamin C, D and Zinc ordered . .. \" Per PCP 10/8,\". Khadra Roach Acute hypoxic respiratory   failure, most likely due to viral pneumonia, O2 sat was below 90% on room   air, requiring no oxygen via nasal cannula and oxygen saturation above 90%. ..   no respiratory distress. Khadra Roach \"   CXR \" Final Result New focal interstitial   opacities located in right lower lung field could suggest pneumonia or   subsegmental atelectasis. Khadra Roach \"   Resp 16   O2 SATS 95% on room air  Treatment: Vitamin C, D and Zinc, IVF, IV ATB    Thank you,  Kenny Orourke RN CCDS  Clinical Documentation Improvement Specialist  [phone]  Options provided:  -- Gram negative pneumonia  -- Gram positive pneumonia  -- Bacterial pneumonia  -- Viral pneumonia  -- Other - I will add my own diagnosis  -- Disagree - Not applicable / Not valid  -- Disagree - Clinically unable to determine / Unknown  -- Refer to Clinical Documentation Reviewer    PROVIDER RESPONSE TEXT:    This patient has bacterial pneumonia.     Query created by: Kelsy Guerrero on 10/8/2021 11:34 AM      Electronically signed by:  Jyoti Cardenas MD 10/8/2021 11:38 AM

## 2021-10-08 NOTE — PROGRESS NOTES
Oct 14th at 11:00am [facility]  Richard mims

## 2021-10-08 NOTE — PROGRESS NOTES
[facility] Progress Note    Admitting Date and Time: 10/7/2021  9:59 AM  Admit Dx: Hyponatremia [E87.1]  Pneumonia [J18.9]  Hyperglycemia [R73.9]  Noncompliance with medication regimen [Z91.14]  Pneumonia of right lower lobe due to infectious organism [J18.9]  COVID-19 virus infection [U07.1]    Subjective:  Patient is being followed for Hyponatremia [E87.1]  Pneumonia [J18.9]  Hyperglycemia [R73.9]  Noncompliance with medication regimen [Z91.14]  Pneumonia of right lower lobe due to infectious organism [J18.9]  COVID-19 virus infection [U07.1]     Pt feels feels coughing still an issue. It wasup throughout the night due to the IV beeping. O2 sat: 95% on RA    Fever: 98    Per RN:     ROS: marsha cp, n/v, HA unless stated above.       azithromycin  500 mg IntraVENous Q24H    sodium chloride flush  5-40 mL IntraVENous 2 times per day    enoxaparin  30 mg SubCUTAneous BID    Vitamin D  2,000 Units Oral Daily    dexamethasone  6 mg IntraVENous Q24H    cefTRIAXone (ROCEPHIN) IV  1,000 mg IntraVENous Q24H    zinc sulfate  50 mg Oral Daily    Vitamin D  1,000 Units Oral Daily    ascorbic acid  500 mg Oral Daily    citalopram  20 mg Oral Daily    folic acid  1 mg Oral Daily    lisinopril  40 mg Oral Daily    montelukast  10 mg Oral Nightly    thiamine  100 mg Oral Daily    sulfamethoxazole-trimethoprim (BACTRIM) IVPB  320 mg IntraVENous Q12H    insulin glargine  20 Units SubCUTAneous Nightly    insulin lispro  0-6 Units SubCUTAneous TID WC    insulin lispro  0-3 Units SubCUTAneous Nightly     sodium chloride flush, 5-40 mL, PRN  sodium chloride, 25 mL, PRN  ondansetron, 4 mg, Q8H PRN   Or  ondansetron, 4 mg, Q6H PRN  polyethylene glycol, 17 g, Daily PRN  acetaminophen, 650 mg, Q6H PRN   Or  acetaminophen, 650 mg, Q6H PRN  guaiFENesin-dextromethorphan, 5 mL, Q4H PRN  albuterol sulfate HFA, 2 puff, Q6H PRN  melatonin, 3 mg, Nightly PRN         Objective:    /79   Pulse 80 Temp 98 °F (36.7 °C) (Oral)   Resp 16   Ht 5' 7\" (1.702 m)   Wt 120 lb (54.4 kg)   SpO2 95%   BMI 18.79 kg/m²     General Appearance: alert and oriented to person, place and time and in no acute distress  Skin: warm and dry  Head: normocephalic and atraumatic  Eyes: pupils equal, round, and reactive to light, extraocular eye movements intact, conjunctivae normal  Neck: neck supple and non tender without mass   Pulmonary/Chest: crackles bilaterally- no wheezes, no respiratory distress  Cardiovascular: normal rate, normal S1 and S2 and no carotid bruits  Abdomen: soft, non-tender, non-distended, normal bowel sounds, no masses or organomegaly  Extremities: no cyanosis, no clubbing and no edema  Neurologic: no cranial nerve deficit and speech normal      Recent Labs     10/07/21  1042 10/08/21  0340   * 128*   K 4.5 4.8   CL 89* 96*   CO2 21* 18*   BUN 14 11   CREATININE 0.9 0.8   GLUCOSE 214* 301*   CALCIUM 9.3 8.8       Recent Labs     10/07/21  1042 10/08/21  0340   WBC 7.7 7.0   RBC 6.40* 5.51   HGB 17.2* 15.3   HCT 54.3* 46.5   MCV 84.8 84.4   MCH 26.9 27.8   MCHC 31.7* 32.9   RDW 13.7 13.5    298   MPV 11.1 10.7       Radiology:   XR CHEST PORTABLE    Result Date: 10/7/2021  EXAMINATION: ONE XRAY VIEW OF THE CHEST 10/7/2021 10:28 am COMPARISON: September 4, 2020 HISTORY: ORDERING SYSTEM PROVIDED HISTORY: COUGH TECHNOLOGIST PROVIDED HISTORY: Reason for exam:->COUGH FINDINGS: New interstitial opacities are present in the right lower lung field. No pleural effusion. The heart is normal size. No pneumothorax. New focal interstitial opacities located in right lower lung field could suggest pneumonia or subsegmental atelectasis. Short-term follow-up may be helpful for further evaluation. Assessment:    Active Problems:    Pneumonia  Resolved Problems:    * No resolved hospital problems. *      Plan:  1.   Acute hypoxic respiratory failure, most likely due to viral pneumonia, O2 sat was below 90% on room air, requiring no oxygen via nasal cannula and oxygen saturation above 90%. Treating the underlying process. 2.  Acute viral pneumonia, rapid influenza a and B were negative, respiratory panel was  Not done, procalcitonin was 0.16, x-ray of the chest showed bilateral patchy groundglass opacities, sent for COVID-19, result positive. 3.  HIV -ID consult. Most likely will resume his ART. 4.  Hypertension -continue to monitor vitals and treat accordingly. 5.  T2DM - Glucose POCT. Hypoglycemia protocol. Check for HgbA1C. Hold Metformin. SS insulin scale for meals and hs. Resume Lantus    NOTE: This report was transcribed using voice recognition software. Every effort was made to ensure accuracy; however, inadvertent computerized transcription errors may be present.     Electronically signed by Yeny Serna MD on 10/8/2021 at 9:18 AM

## 2021-10-08 NOTE — PROGRESS NOTES
Physician Progress Note      PATIENTDiane Stage  [facility] #:                  [id number]  :                       1975  ADMIT DATE:       10/7/2021 9:59 AM  DISCH DATE:  RESPONDING  PROVIDER #:        Rosina Chu MD          QUERY TEXT:    Dear Attending Physician,    Patient admitted with COVID 19 Pneumonia and RLL Pneumonia . Noted   documentation of Acute Respiratory Failure in PCP progress note 10/8. In order   to support the diagnosis of Acute Respiratory Failure, please include   additional clinical indicators in your documentation. Or please document if   the diagnosis of Acute Respiratory Failure has been ruled out after further   study. The medical record reflects the following:  Risk Factors: COVID Pneumonia , HIV, 40 lb  loss  Clinical Indicators: Per H/P \" . Jabari Jairon RLL pneumonia - Coverage for CAP and   Pjiroveri. .. COVID 19 pneumonia - patient is not hypoxic; supportive care. Vitamin C, D and Zinc ordered . .. \" Per PCP 10/8,\". Jabari De La O Acute hypoxic respiratory   failure, most likely due to viral pneumonia, O2 sat was below 90% on room   air, requiring no oxygen via nasal cannula and oxygen saturation above 90%. ..   no respiratory distress. Jabari Barboza Jairon \"   CXR \" Final Result New focal interstitial   opacities located in right lower lung field could suggest pneumonia or   subsegmental atelectasis. Jabari De La O \"   Resp 16   O2 SATS 95% on room air  Treatment: Vitamin C, D and Zinc, IVF, IV ATB    Thank you,  Maegan Santos RN CCDS  Clinical Documentation Improvement Specialist  [phone]  Options provided:  -- Acute Respiratory Failure as evidenced by, Please document evidence. -- Acute Respiratory Failure ruled out after study  -- Other - I will add my own diagnosis  -- Disagree - Not applicable / Not valid  -- Disagree - Clinically unable to determine / Unknown  -- Refer to Clinical Documentation Reviewer    PROVIDER RESPONSE TEXT:    Acute Respiratory Failure has been ruled out after study.     Query created by: Promise Corrales on 10/8/2021 11:30 AM      Electronically signed by:  Yahaira Hess MD 10/8/2021 11:35 AM

## 2021-10-09 VITALS
TEMPERATURE: 98 F | HEIGHT: 67 IN | SYSTOLIC BLOOD PRESSURE: 100 MMHG | WEIGHT: 120 LBS | OXYGEN SATURATION: 98 % | BODY MASS INDEX: 18.83 KG/M2 | HEART RATE: 88 BPM | RESPIRATION RATE: 16 BRPM | DIASTOLIC BLOOD PRESSURE: 70 MMHG

## 2021-10-09 LAB
ALBUMIN SERPL-MCNC: 2.8 G/DL (ref 3.5–5.2)
ALP BLD-CCNC: 72 U/L (ref 40–129)
ALT SERPL-CCNC: 34 U/L (ref 0–40)
ANION GAP SERPL CALCULATED.3IONS-SCNC: 13 MMOL/L (ref 7–16)
AST SERPL-CCNC: 42 U/L (ref 0–39)
BASOPHILS ABSOLUTE: 0.03 E9/L (ref 0–0.2)
BASOPHILS RELATIVE PERCENT: 0.4 % (ref 0–2)
BILIRUB SERPL-MCNC: 0.3 MG/DL (ref 0–1.2)
BUN BLDV-MCNC: 11 MG/DL (ref 6–20)
CALCIUM SERPL-MCNC: 9 MG/DL (ref 8.6–10.2)
CHLORIDE BLD-SCNC: 97 MMOL/L (ref 98–107)
CO2: 19 MMOL/L (ref 22–29)
CREAT SERPL-MCNC: 0.8 MG/DL (ref 0.7–1.2)
D DIMER: 364 NG/ML DDU
EOSINOPHILS ABSOLUTE: 0 E9/L (ref 0.05–0.5)
EOSINOPHILS RELATIVE PERCENT: 0 % (ref 0–6)
FIBRINOGEN: >700 MG/DL (ref 225–540)
GFR AFRICAN AMERICAN: >60
GFR NON-AFRICAN AMERICAN: >60 ML/MIN/1.73
GLUCOSE BLD-MCNC: 385 MG/DL (ref 74–99)
HCT VFR BLD CALC: 48.6 % (ref 37–54)
HEMOGLOBIN: 15.6 G/DL (ref 12.5–16.5)
IMMATURE GRANULOCYTES #: 0.06 E9/L
IMMATURE GRANULOCYTES %: 0.7 % (ref 0–5)
LYMPHOCYTES ABSOLUTE: 1.03 E9/L (ref 1.5–4)
LYMPHOCYTES RELATIVE PERCENT: 12.5 % (ref 20–42)
MCH RBC QN AUTO: 27.1 PG (ref 26–35)
MCHC RBC AUTO-ENTMCNC: 32.1 % (ref 32–34.5)
MCV RBC AUTO: 84.5 FL (ref 80–99.9)
MONOCYTES ABSOLUTE: 0.38 E9/L (ref 0.1–0.95)
MONOCYTES RELATIVE PERCENT: 4.6 % (ref 2–12)
NEUTROPHILS ABSOLUTE: 6.71 E9/L (ref 1.8–7.3)
NEUTROPHILS RELATIVE PERCENT: 81.8 % (ref 43–80)
PDW BLD-RTO: 13.4 FL (ref 11.5–15)
PLATELET # BLD: 368 E9/L (ref 130–450)
PMV BLD AUTO: 10.8 FL (ref 7–12)
POTASSIUM REFLEX MAGNESIUM: 5.3 MMOL/L (ref 3.5–5)
RBC # BLD: 5.75 E12/L (ref 3.8–5.8)
SODIUM BLD-SCNC: 129 MMOL/L (ref 132–146)
SPECIMEN: NORMAL
TOTAL PROTEIN: 8.4 G/DL (ref 6.4–8.3)
URINE CULTURE, ROUTINE: NORMAL
WBC # BLD: 8.2 E9/L (ref 4.5–11.5)

## 2021-10-09 PROCEDURE — 85384 FIBRINOGEN ACTIVITY: CPT

## 2021-10-09 PROCEDURE — 6370000000 HC RX 637 (ALT 250 FOR IP): Performed by: FAMILY MEDICINE

## 2021-10-09 PROCEDURE — 85378 FIBRIN DEGRADE SEMIQUANT: CPT

## 2021-10-09 PROCEDURE — 87449 NOS EACH ORGANISM AG IA: CPT

## 2021-10-09 PROCEDURE — 36415 COLL VENOUS BLD VENIPUNCTURE: CPT

## 2021-10-09 PROCEDURE — 99239 HOSP IP/OBS DSCHRG MGMT >30: CPT | Performed by: FAMILY MEDICINE

## 2021-10-09 PROCEDURE — 85025 COMPLETE CBC W/AUTO DIFF WBC: CPT

## 2021-10-09 PROCEDURE — 2580000003 HC RX 258: Performed by: FAMILY MEDICINE

## 2021-10-09 PROCEDURE — 6360000002 HC RX W HCPCS: Performed by: FAMILY MEDICINE

## 2021-10-09 PROCEDURE — 80053 COMPREHEN METABOLIC PANEL: CPT

## 2021-10-09 RX ORDER — INSULIN GLARGINE 100 [IU]/ML
27 INJECTION, SOLUTION SUBCUTANEOUS
Qty: 10 ML | Refills: 0 | Status: SHIPPED | OUTPATIENT
Start: 2021-10-09

## 2021-10-09 RX ORDER — AZITHROMYCIN 600 MG/1
1200 TABLET, FILM COATED ORAL WEEKLY
Qty: 8 TABLET | Refills: 0 | Status: SHIPPED | OUTPATIENT
Start: 2021-10-09 | End: 2021-11-08

## 2021-10-09 RX ORDER — CHOLECALCIFEROL (VITAMIN D3) 50 MCG
2000 TABLET ORAL DAILY
Qty: 60 TABLET | Refills: 0 | Status: SHIPPED | OUTPATIENT
Start: 2021-10-10

## 2021-10-09 RX ORDER — AZITHROMYCIN 600 MG/1
1200 TABLET, FILM COATED ORAL WEEKLY
Status: DISCONTINUED | OUTPATIENT
Start: 2021-10-09 | End: 2021-10-09 | Stop reason: SDUPTHER

## 2021-10-09 RX ORDER — AZITHROMYCIN 200 MG/5ML
1200 POWDER, FOR SUSPENSION ORAL WEEKLY
Status: DISCONTINUED | OUTPATIENT
Start: 2021-10-09 | End: 2021-10-09 | Stop reason: HOSPADM

## 2021-10-09 RX ORDER — DEXTROSE MONOHYDRATE 25 G/50ML
12.5 INJECTION, SOLUTION INTRAVENOUS PRN
Status: DISCONTINUED | OUTPATIENT
Start: 2021-10-09 | End: 2021-10-09 | Stop reason: HOSPADM

## 2021-10-09 RX ORDER — ZINC SULFATE 50(220)MG
50 CAPSULE ORAL DAILY
Qty: 30 CAPSULE | Refills: 0 | Status: SHIPPED | OUTPATIENT
Start: 2021-10-10

## 2021-10-09 RX ORDER — GUAIFENESIN/DEXTROMETHORPHAN 100-10MG/5
10 SYRUP ORAL EVERY 4 HOURS PRN
Qty: 120 ML | Refills: 0 | COMMUNITY
Start: 2021-10-09 | End: 2021-10-19

## 2021-10-09 RX ORDER — SULFAMETHOXAZOLE AND TRIMETHOPRIM 400; 80 MG/1; MG/1
1 TABLET ORAL
Qty: 12 TABLET | Refills: 0 | Status: SHIPPED | OUTPATIENT
Start: 2021-10-11 | End: 2021-11-10

## 2021-10-09 RX ORDER — DEXAMETHASONE 6 MG/1
TABLET ORAL
Qty: 6 TABLET | Refills: 0 | Status: SHIPPED | OUTPATIENT
Start: 2021-10-09 | End: 2021-10-18

## 2021-10-09 RX ORDER — INSULIN GLARGINE 100 [IU]/ML
27 INJECTION, SOLUTION SUBCUTANEOUS
Qty: 10 ML | Refills: 3 | Status: CANCELLED | OUTPATIENT
Start: 2021-10-09

## 2021-10-09 RX ORDER — METFORMIN HYDROCHLORIDE 500 MG/1
500 TABLET, EXTENDED RELEASE ORAL 2 TIMES DAILY WITH MEALS
Qty: 60 TABLET | Refills: 0 | Status: SHIPPED | OUTPATIENT
Start: 2021-10-09 | End: 2021-11-08

## 2021-10-09 RX ORDER — BENZONATATE 200 MG/1
200 CAPSULE ORAL 3 TIMES DAILY
Qty: 21 CAPSULE | Refills: 0 | Status: SHIPPED | OUTPATIENT
Start: 2021-10-09 | End: 2021-10-16

## 2021-10-09 RX ORDER — NICOTINE POLACRILEX 4 MG
15 LOZENGE BUCCAL PRN
Status: DISCONTINUED | OUTPATIENT
Start: 2021-10-09 | End: 2021-10-09 | Stop reason: HOSPADM

## 2021-10-09 RX ORDER — INSULIN GLARGINE 100 [IU]/ML
27 INJECTION, SOLUTION SUBCUTANEOUS
Status: DISCONTINUED | OUTPATIENT
Start: 2021-10-09 | End: 2021-10-09 | Stop reason: HOSPADM

## 2021-10-09 RX ORDER — METFORMIN HYDROCHLORIDE 500 MG/1
500 TABLET, EXTENDED RELEASE ORAL 2 TIMES DAILY WITH MEALS
Status: DISCONTINUED | OUTPATIENT
Start: 2021-10-09 | End: 2021-10-09 | Stop reason: HOSPADM

## 2021-10-09 RX ORDER — SULFAMETHOXAZOLE AND TRIMETHOPRIM 400; 80 MG/1; MG/1
1 TABLET ORAL
Status: DISCONTINUED | OUTPATIENT
Start: 2021-10-11 | End: 2021-10-09 | Stop reason: HOSPADM

## 2021-10-09 RX ORDER — DEXTROSE MONOHYDRATE 50 MG/ML
100 INJECTION, SOLUTION INTRAVENOUS PRN
Status: DISCONTINUED | OUTPATIENT
Start: 2021-10-09 | End: 2021-10-09 | Stop reason: HOSPADM

## 2021-10-09 RX ORDER — ASCORBIC ACID 500 MG
500 TABLET ORAL DAILY
Qty: 30 TABLET | Refills: 0 | Status: SHIPPED | OUTPATIENT
Start: 2021-10-10

## 2021-10-09 RX ADMIN — Medication 2000 UNITS: at 08:50

## 2021-10-09 RX ADMIN — METFORMIN HYDROCHLORIDE 500 MG: 500 TABLET, EXTENDED RELEASE ORAL at 11:31

## 2021-10-09 RX ADMIN — INSULIN GLARGINE 27 UNITS: 100 INJECTION, SOLUTION SUBCUTANEOUS at 11:52

## 2021-10-09 RX ADMIN — CITALOPRAM HYDROBROMIDE 20 MG: 20 TABLET ORAL at 08:52

## 2021-10-09 RX ADMIN — FOLIC ACID 1 MG: 1 TABLET ORAL at 08:50

## 2021-10-09 RX ADMIN — ENOXAPARIN SODIUM 30 MG: 30 INJECTION, SOLUTION INTRAVENOUS; SUBCUTANEOUS at 08:49

## 2021-10-09 RX ADMIN — OXYCODONE HYDROCHLORIDE AND ACETAMINOPHEN 500 MG: 500 TABLET ORAL at 08:50

## 2021-10-09 RX ADMIN — Medication 100 MG: at 08:51

## 2021-10-09 RX ADMIN — Medication 5 ML: at 08:52

## 2021-10-09 RX ADMIN — BENZONATATE 200 MG: 100 CAPSULE ORAL at 08:50

## 2021-10-09 RX ADMIN — LISINOPRIL 40 MG: 20 TABLET ORAL at 08:51

## 2021-10-09 ASSESSMENT — PAIN SCALES - GENERAL: PAINLEVEL_OUTOF10: 0

## 2021-10-09 NOTE — DISCHARGE SUMMARY
[facility] Physician Discharge Summary       to establish PCP   Please call: [phone] on discharge        Nubia Barth MD  [address] [place] [address]  Kiana Rogers  [phone]            Activity level: As tolerated     Dispo: Home    Condition on discharge: Stable     Patient ID:  Sukhjinder Rivera  [id number]  50 y.o.  1975    Admit date: 10/7/2021    Discharge date and time:  10/9/2021  9:53 AM    Admission Diagnoses: Active Problems:    Pneumonia  Resolved Problems:    * No resolved hospital problems. *      Discharge Diagnoses: Active Problems:    Pneumonia  Resolved Problems:    * No resolved hospital problems. *      Consults:  IP CONSULT TO INFECTIOUS DISEASES  IP CONSULT TO DIETITIAN    Procedures:  n/a    Hospital Course:   Patient Sukhjinder Rivera is a 51 y.o. presented with Hyponatremia [E87.1]  Pneumonia [J18.9]  Hyperglycemia [R73.9]  Noncompliance with medication regimen [Z91.14]  Pneumonia of right lower lobe due to infectious organism [J18.9]  COVID-19 virus infection [U07.1]  Patient was admitted for further evaluation and treatment. He was seen by Infectious Disease. It was felt that his pneumonia was due to Covid 19 not due to community-acquired pneumonitis or Pneumocystis jiroveci. Antibiotics were discontinued. He was continued on Decadron, ascorbic acid, vitamin D, and zinc.  He had diabetes mellitus type 2 requiring insulin and Metformin. These were resumed on the day of discharge. Prescriptions were sent to [facility] as well as for glucometer and diabetic supplies.   He has a follow-up appointment with Dr. Corrinne Bedford on 10/14/2021 at 11 AM.    Discharge Exam:    General Appearance: alert and oriented to person, place and time and in no acute distress  Skin: warm and dry  Head: normocephalic and atraumatic  Eyes: pupils equal, round, and reactive to light, extraocular eye movements intact, conjunctivae normal  Neck: neck supple and non tender without mass   Pulmonary/Chest: clear to auscultation bilaterally- no wheezes, rales or rhonchi, normal air movement, no respiratory distress  Cardiovascular: normal rate, normal S1 and S2 and no carotid bruits  Abdomen: soft, non-tender, non-distended, normal bowel sounds, no masses or organomegaly  Extremities: no cyanosis, no clubbing and no edema  Neurologic: no cranial nerve deficit and speech normal    LABS:  Recent Labs     10/07/21  1042 10/08/21  0340 10/09/21  0210   * 128* 129*   K 4.5 4.8 5.3*   CL 89* 96* 97*   CO2 21* 18* 19*   BUN 14 11 11   CREATININE 0.9 0.8 0.8   GLUCOSE 214* 301* 385*   CALCIUM 9.3 8.8 9.0       Recent Labs     10/07/21  1042 10/08/21  0340 10/09/21  0210   WBC 7.7 7.0 8.2   RBC 6.40* 5.51 5.75   HGB 17.2* 15.3 15.6   HCT 54.3* 46.5 48.6   MCV 84.8 84.4 84.5   MCH 26.9 27.8 27.1   MCHC 31.7* 32.9 32.1   RDW 13.7 13.5 13.4    298 368   MPV 11.1 10.7 10.8     Imaging:  XR CHEST PORTABLE    Result Date: 10/7/2021  EXAMINATION: ONE XRAY VIEW OF THE CHEST 10/7/2021 10:28 am COMPARISON: September 4, 2020 HISTORY: ORDERING SYSTEM PROVIDED HISTORY: COUGH TECHNOLOGIST PROVIDED HISTORY: Reason for exam:->COUGH FINDINGS: New interstitial opacities are present in the right lower lung field. No pleural effusion. The heart is normal size. No pneumothorax. New focal interstitial opacities located in right lower lung field could suggest pneumonia or subsegmental atelectasis. Short-term follow-up may be helpful for further evaluation.        Patient Instructions:      Medication List      START taking these medications    ascorbic acid 500 MG tablet  Commonly known as: VITAMIN C  Take 1 tablet by mouth daily  Start taking on: October 10, 2021     benzonatate 200 MG capsule  Commonly known as: TESSALON  Take 1 capsule by mouth 3 times daily for 7 days     dexamethasone 6 MG tablet  Commonly known as: DECADRON  Take 1 tablet by mouth daily (with breakfast) for 3 days, THEN 0.5 tablets daily (with breakfast) for 3 days, THEN 0.5 tablets every other day for 3 days. Start taking on: October 9, 2021     guaiFENesin-dextromethorphan 100-10 MG/5ML syrup  Commonly known as: ROBITUSSIN DM  Take 10 mLs by mouth every 4 hours as needed for Cough     metFORMIN 500 MG extended release tablet  Commonly known as: GLUCOPHAGE-XR  Take 1 tablet by mouth 2 times daily (with meals)  Replaces: metFORMIN 1000 MG tablet     vitamin D 50 MCG (2000 UT) Tabs tablet  Commonly known as: CHOLECALCIFEROL  Take 1 tablet by mouth daily  Start taking on: October 10, 2021     zinc sulfate 220 (50 Zn) MG capsule  Commonly known as: ZINCATE  Take 1 capsule by mouth daily  Start taking on: October 10, 2021        CHANGE how you take these medications    insulin glargine 100 UNIT/ML injection vial  Commonly known as: LANTUS  Inject 27 Units into the skin daily (before lunch)  What changed:   · how much to take  · when to take this     Insulin Syringe-Needle U-100 25G X 1\" 1 ML Misc  1 each by Does not apply route 4 times daily (after meals and at bedtime) Use as directed May substitute for insurance coverage  What changed: Another medication with the same name was removed. Continue taking this medication, and follow the directions you see here.         CONTINUE taking these medications    albuterol sulfate  (90 Base) MCG/ACT inhaler  Commonly known as: ProAir HFA  Inhale 2 puffs into the lungs every 6 hours as needed for Wheezing     Alcohol Swabs Pads  1 strip by Does not apply route 4 times daily (after meals and at bedtime)     budesonide 0.5 MG/2ML nebulizer suspension  Commonly known as: PULMICORT  Take 4 mLs by nebulization 2 times daily     citalopram 20 MG tablet  Commonly known as: CELEXA  Take 1 tablet by mouth daily     Compressor/Nebulizer Misc  1 Device by Does not apply route 2 times daily     folic acid 1 MG tablet  Commonly known as: FOLVITE  Take 1 tablet by mouth daily     ipratropium-albuterol 0.5-2.5 (3) MG/3ML Soln nebulizer solution  Commonly known as: DUONEB  Inhale 3 mLs into the lungs every 4 hours as needed for Shortness of Breath (wheezing)     lisinopril 40 MG tablet  Commonly known as: PRINIVIL;ZESTRIL  Take 1 tablet by mouth daily     melatonin 3 MG Tabs tablet  Take 1 tablet by mouth nightly as needed (sleep)     montelukast 10 MG tablet  Commonly known as: SINGULAIR  Take 1 tablet by mouth nightly.      Nebulizer/Tubing/Mouthpiece Kit  1 kit by Does not apply route daily as needed (hx of asthma)     thiamine 100 MG tablet  Take 1 tablet by mouth daily        STOP taking these medications    blood glucose test strips     Descovy 200-25 MG Tabs tablet  Generic drug: emtricitabine-tenofovir alafenamide     Evotaz 300-150 MG Tabs  Generic drug: Atazanavir-Cobicistat     glucose monitoring kit     Lancets Misc     metFORMIN 1000 MG tablet  Commonly known as: GLUCOPHAGE  Replaced by: metFORMIN 500 MG extended release tablet     multivitamin tablet     Norvir 100 MG capsule  Generic drug: ritonavir     Reyataz 300 MG capsule  Generic drug: atazanavir     Truvada 200-300 MG per tablet  Generic drug: emtricitabine-tenofovir           Where to Get Your Medications      These medications were sent to [address] [phone]  F [phone]  [address] [address]    Phone: [phone]   · ascorbic acid 500 MG tablet  · benzonatate 200 MG capsule  · dexamethasone 6 MG tablet  · insulin glargine 100 UNIT/ML injection vial  · Insulin Syringe-Needle U-100 25G X 1\" 1 ML Misc  · metFORMIN 500 MG extended release tablet  · vitamin D 50 MCG (2000 UT) Tabs tablet  · zinc sulfate 220 (50 Zn) MG capsule     You can get these medications from any pharmacy    You don't need a prescription for these medications  · guaiFENesin-dextromethorphan 100-10 MG/5ML syrup           Note that more than 35 minutes was spent in preparing discharge papers, discussing

## 2021-10-09 NOTE — CARE COORDINATION
Social Work:    Received a call from [employer] [facility] advising that patient is discharged and needs a ride home. Social work reviewed [employer] and placed a call to [employer] [employer] [facility] at [phone] option 4 (discharge from hospital) and was on for over 15 minutes, therefore, Physician ambulance was arranged to take patient home at 5:30 p.m. Social work updated Rhonda AYOUB.     Electronically signed by EARL Cifuentes on 10/9/2021 at 12:34 PM

## 2021-10-09 NOTE — PROGRESS NOTES
[address] [place] [address] [facility]  Progress Note    SUBJECTIVE:  No chief complaint on file. The patient feels great today. No dyspnea. No cough. No fever. Review of systems:  As stated above in the chief complaint, otherwise negative. Medications:  Scheduled Meds:   metFORMIN  500 mg Oral BID WC    insulin glargine  27 Units SubCUTAneous Daily before lunch    benzonatate  200 mg Oral TID    sodium chloride flush  5-40 mL IntraVENous 2 times per day    enoxaparin  30 mg SubCUTAneous BID    Vitamin D  2,000 Units Oral Daily    dexamethasone  6 mg IntraVENous Q24H    zinc sulfate  50 mg Oral Daily    ascorbic acid  500 mg Oral Daily    citalopram  20 mg Oral Daily    folic acid  1 mg Oral Daily    lisinopril  40 mg Oral Daily    montelukast  10 mg Oral Nightly    thiamine  100 mg Oral Daily    insulin lispro  0-6 Units SubCUTAneous TID WC    insulin lispro  0-3 Units SubCUTAneous Nightly     Continuous Infusions:   dextrose      sodium chloride       PRN Meds:glucose, dextrose, glucagon (rDNA), dextrose, albuterol sulfate HFA, sodium chloride flush, sodium chloride, ondansetron **OR** ondansetron, polyethylene glycol, acetaminophen **OR** acetaminophen, guaiFENesin-dextromethorphan, albuterol sulfate HFA, melatonin    OBJECTIVE:  /70   Pulse 88   Temp 98 °F (36.7 °C) (Oral)   Resp 16   Ht 5' 7\" (1.702 m)   Wt 120 lb (54.4 kg)   SpO2 98%   BMI 18.79 kg/m²   Temp  Av.8 °F (36.6 °C)  Min: 97.5 °F (36.4 °C)  Max: 98 °F (36.7 °C)  Constitutional: The patient is awake, alert, and oriented. Skin: Warm and dry. No rashes were noted. HEENT: Round and reactive pupils. Moist mucous membranes. No ulcerations or thrush. Neck: Supple to movements. Chest: No use of accessory muscles to breathe. Symmetrical expansion. No wheezing, crackles or rhonchi. Cardiovascular: S1 and S2 are rhythmic and regular. No murmurs appreciated.    Abdomen: Positive bowel sounds to auscultation. Benign to palpation. Extremities: No edema. Lines: peripheral    Laboratory and Tests Review:  Lab Results   Component Value Date    WBC 8.2 10/09/2021    WBC 7.0 10/08/2021    WBC 7.7 10/07/2021    HGB 15.6 10/09/2021    HCT 48.6 10/09/2021    MCV 84.5 10/09/2021     10/09/2021     Lab Results   Component Value Date    NEUTROABS 6.71 10/09/2021    NEUTROABS 6.37 10/08/2021    NEUTROABS 4.85 10/07/2021     No results found for: CRPHS  Lab Results   Component Value Date    ALT 34 10/09/2021    AST 42 (H) 10/09/2021    ALKPHOS 72 10/09/2021    BILITOT 0.3 10/09/2021     Lab Results   Component Value Date     10/09/2021    K 5.3 10/09/2021    CL 97 10/09/2021    CO2 19 10/09/2021    BUN 11 10/09/2021    CREATININE 0.8 10/09/2021    CREATININE 0.8 10/08/2021    CREATININE 0.9 10/07/2021    GFRAA >60 10/09/2021    LABGLOM >60 10/09/2021    GLUCOSE 385 10/09/2021    GLUCOSE 112 11/25/2010    PROT 8.4 10/09/2021    LABALBU 2.8 10/09/2021    LABALBU 4.3 11/22/2010    CALCIUM 9.0 10/09/2021    BILITOT 0.3 10/09/2021    ALKPHOS 72 10/09/2021    AST 42 10/09/2021    ALT 34 10/09/2021     Lab Results   Component Value Date    CRP 4.0 (H) 01/26/2020     Lab Results   Component Value Date    SEDRATE 4 01/26/2020     Radiology:      Microbiology:   Respiratory panel: SARS-CoV-2  Blood cultures 10/7/2021: Negative so far  Urine culture: Negative so far    Recent Labs     10/07/21  1042 10/08/21  0340   PROCAL 0.16* 0.09*       ASSESSMENT:  · HIV infection, noncompliant with antiretrovirals. He stopped them over 6 months ago and has not seen his ID physician  · SARS-CoV-2 infection    PLAN:  · Bactrim for OI prophylaxis 3 times weekly  · Azithromycin once weekly  · The patient can be discharged from ID standpoint. Follow-up at the [facility] with Dr. Cristian Calvin.   ID will sign off    Spoke with nursing    Cristopher Benjamin MD  11:54 AM  10/9/2021

## 2021-10-10 LAB
L. PNEUMOPHILA SEROGP 1 UR AG: NORMAL
STREP PNEUMONIAE ANTIGEN, URINE: NORMAL

## 2021-10-11 ENCOUNTER — CARE COORDINATION (OUTPATIENT)
Dept: CASE MANAGEMENT | Age: 46
End: 2021-10-11

## 2021-10-12 LAB
BLOOD CULTURE, ROUTINE: NORMAL
CULTURE, BLOOD 2: NORMAL

## 2021-10-15 LAB
HIV INTERPRETATION: ABNORMAL
HIV-1 ANTIBODY: POSITIVE
HIV-2 AB: NEGATIVE

## 2022-05-11 NOTE — CONSULTS
COLONOSCOPY PROCEDURE NOTE        PATIENT INFORMATION:  Jomar Jensen   70year old   male    MEDICAL RECORD NUMBER:  [id number]    PREOPERATIVE DIAGNOSIS   Personal Hx of Colonic Polyps, last colonoscopy was 5  Years ago    PROCEDURE    Colonoscopy diagnostic    POSTOPERATIVE DIAGNOSIS    Normal Colonoscopy    SEDATION      IV Sedation      SEDATION START TIME: 0740    SEDATION MONITORING TIME:  0 Hr 27 Min 58 Sec    Moderate sedation was administered with continuous monitoring of the patient by a trained caregiver under my direct supervision. Please refer to nursing documentation for doses of medications administered intravenously as well as the patientâs status during the procedure. An independent observer was present through the sedation process. Total sedation (intra-service) time was 30 minutes. FENTANYL 50 MCG, VERSED 9 MG, BENADRYL 50 MG     At [place]: 0907        0 Hr 27 Min 58 Sec    Event Time In   In [address]   In Pre-Procedure 0738   Physician Available    Anesthesia Available    Pre-Procedure Complete 0759   Out of Pre-Procedure    Ready for Transport    In Holding    In Block Room    Out of Block Room    Anesthesia Start    In Room 0848   Procedure Start 8541   Closing    Procedure Finish 0913   Out of Room 0915   In PACU    Anesthesia [facility]    PACU Care Complete    Out of Phase I    In Phase II    Phase II Care Complete    Procedural Care Complete    Block Start    Block Stop    Transport Complete    Sedation Start - Non Anesthesia    Sedation End - Non Anesthesia          SURGEON:  Chin MICHEL    ASSISTANT  : None    ASSISTANT  TASKS: N/A      PROCEDURE DETAILS    Informed consent was obtained for the procedure, including sedation. Risks of perforation, hemorrhage, adverse drug reaction and aspiration were discussed. The patient verbalized understanding and wanted to proceed. Informed written consent was obtained.  The patient was placed in the left lateral Department of Internal Medicine  Infectious Diseases   Consult Note      Reason for Consult: HIV management       Requesting Physician:  Dr Alma Urbano:                  Pt is known to me . This is a 54 yrs old male with HIV infection (  CD4 count 812  and HIV viral load <20 as of April /2020- on Descovy and Evotaz ) admitted to the hospital with suicidal ideation and wanted help  . Urine drug screeen +Ve for cocaine .  He denies fever, chills, chest pain, headache   Reports that he's missed HIV meds for few days     Past Medical History:    Past Medical History:   Diagnosis Date    [facility] mellitus ([facility]     HIV (human immunodeficiency virus infection) ([facility]     Hypertension     Neuropathy          Past Surgical History:      None     Current Medications:      Current Facility-Administered Medications   Medication Dose Route Frequency Provider Last Rate Last Dose    insulin lispro (HUMALOG) injection vial 0-6 Units  0-6 Units Subcutaneous TID WC Amie Kerns MD   2 Units at 05/22/20 1252    insulin lispro (HUMALOG) injection vial 0-3 Units  0-3 Units Subcutaneous Nightly Amie Kerns MD        insulin glargine (LANTUS) injection vial 30 Units  30 Units Subcutaneous Nightly Amie Kerns MD        acetaminophen (TYLENOL) tablet 650 mg  650 mg Oral Q6H PRN Elle Mcdonald MD        hydrOXYzine (VISTARIL) capsule 50 mg  50 mg Oral TID PRN Elle Mcdonald MD   50 mg at 05/21/20 2108    haloperidol lactate (HALDOL) injection 5 mg  5 mg Intramuscular Q6H PRN Elle Mcdonald MD        Or    haloperidol (HALDOL) tablet 5 mg  5 mg Oral Q6H PRN Elle Mcdonald MD        traZODone (DESYREL) tablet 50 mg  50 mg Oral Nightly PRN Elle Mcdonald MD   50 mg at 05/21/20 2108    magnesium hydroxide (MILK OF MAGNESIA) 400 MG/5ML suspension 30 mL  30 mL Oral Daily PRN Elle Mcdonald MD        aluminum & magnesium hydroxide-simethicone ([address]) 142-936-74 MG/5ML suspension 30 mL  30 mL Oral PRN Asuncion Hidalgo MD        nicotine (NICODERM CQ) 21 MG/24HR 1 patch  1 patch Transdermal Daily Asuncion Hidalgo MD   1 patch at 05/22/20 1822    chlordiazePOXIDE (LIBRIUM) capsule 25 mg  25 mg Oral 4x Daily AprilNICHOLAS Ortega CNP   25 mg at 05/22/20 1257    divalproex (DEPAKOTE) DR tablet 250 mg  250 mg Oral 2 times per day NICHOLAS Mulligan CNP   032 mg at 05/22/20 0854    glucose (GLUTOSE) 40 % oral gel 15 g  15 g Oral PRN Misa Roberts MD        dextrose 50 % IV solution  12.5 g Intravenous PRN Misa Roberts MD        glucagon (rDNA) injection 1 mg  1 mg Intramuscular PRN Misa Roberts MD        dextrose 5 % solution  100 mL/hr Intravenous PRN Misa Roberts MD        folic acid (FOLVITE) tablet 1 mg  1 mg Oral Daily Misa Roberts MD   1 mg at 05/22/20 0854    lisinopril (PRINIVIL;ZESTRIL) tablet 40 mg  40 mg Oral Daily Misa Roberts MD   40 mg at 05/22/20 0930    metFORMIN (GLUCOPHAGE) tablet 1,000 mg  1,000 mg Oral BID WC Misa Roberts MD   1,000 mg at 05/22/20 6135    vitamin B-1 (THIAMINE) tablet 100 mg  100 mg Oral Daily Misa Roberts MD   100 mg at 05/22/20 1875    perflutren lipid microspheres (DEFINITY) injection 1.65 mg  1.5 mL Intravenous ONCE PRN Misa Roberts MD           Allergies:  Bee venom; Ibuprofen; and Nutritional supplements    Social History:      Social History     Socioeconomic History    Marital status:       Spouse name: Not on file    Number of children: 0    Years of education: 8    Highest education level: Not on file   Occupational History     Employer: UNEMPLOYED   Social Needs    Financial resource strain: Not on file    Food insecurity     Worry: Not on file     Inability: Not on file   [employer] needs     Medical: Not on file     Non-medical: Not on file   Tobacco Use    Smoking status: Current Every Day Smoker     Packs/day: 2.00     Years: 20.00     Pack years: 40.00     Types: Cigarettes    Smokeless tobacco: Never Used decubitus position. Based on the pre-procedure assessment, including review of the patient's medical history, medications, allergies, and review of systems, the patient had been deemed to be an appropriate candidate for conscious sedation; he was therefore sedated with the medications listed above. The patient was monitored continuously with ECG tracing, pulse oximetry, blood pressure monitoring, and direct observations. A rectal examination was performed. No masses were felt. The colonoscope was inserted into the rectum and advanced under direct vision to the cecum. A careful inspection was made as the colonoscope was withdrawn, including a retroflexed view of the rectum; findings and interventions are described below. Appropriate photo documentation was obtained. Enhanced measures for visualization of the right colon:  Measures to enhance visualization of the right colon included retroflexion and duplicate examination of the right colon. Quality of Bowel preparation: ([place] Prep Scale)    Right Colon: 3  Transverse Colon:3  Left  Colon:3    0 = Unprepared colon segment with mucosa not seen due to solid stool that cannot be cleared. 1 = Portion of mucosa of the colon segment seen, but other areas of the colon segment not well seen due to staining, residual stool and/or opaque liquid. 2 = Minor amount of residual staining, small fragments of stool and/or opaque liquid, but mucosa of colon segment seen well. 3 = Entire mucosa of colon segment seen well with no residual staining, small fragments of stool or opaque liquid.      FINDINGS      Normal colonoscopy      IMPLANTS: None    SPECIMENS : None      ESTIMATED BLOOD LOSS:   None    COMPLICATIONS    None    DISPOSITION    Repeat in 5 years     CONDITION    Stable      Marco Antonio HARTMAN Substance and Sexual Activity    Alcohol use: Yes     Alcohol/week: 0.0 standard drinks     Comment: states that he drinks a lot and did so today     Drug use: Yes     Types: Marijuana, Cocaine     Comment: last use 5-20-20    Sexual activity: Not on file   Lifestyle    Physical activity     Days per week: Not on file     Minutes per session: Not on file    Stress: Not on file   Relationships    Social connections     Talks on phone: Not on file     Gets together: Not on file     Attends Spiritism service: Not on file     Active member of club or organization: Not on file     Attends meetings of clubs or organizations: Not on file     Relationship status: Not on file    Intimate partner violence     Fear of current or ex partner: Not on file     Emotionally abused: Not on file     Physically abused: Not on file     Forced sexual activity: Not on file   Other Topics Concern    Not on file   Social History Narrative    Not on file         Family History:     Family History   Problem Relation Age of Onset    Diabetes Mother     Prostate Cancer Father        REVIEW OF SYSTEMS:    CONSTITUTIONAL:  Denies fever, chill or rigors, nausea or vomiting. HEENT: denies blurring of vision or double vision, denies hearing problem  RESPIRATORY: denies cough, shortness of breath, sputum expectoration, chest pain. CARDIOVASCULAR:  Denies palpitation  GASTROINTESTINAL:  Denies abdomen pain, diarrhea or constipation. GENITOURINARY:  Denies burning urination or frequency of urination  INTEGUMENT: denies wound , rash  HEMATOLOGIC/LYMPHATIC:  Denies lymph node swelling, gum bleeding or easy bruising.   MUSCULOSKELETAL:  Denies leg pain , joint pain , joint swelling  NEUROLOGICAL: Denies lightheaded, dizziness     PHYSICAL EXAM:      Vitals:     /62   Pulse 91   Temp 97.3 °F (36.3 °C)   Resp 14   Ht 5' 8\" (1.727 m)   Wt 170 lb (77.1 kg)   SpO2 99%   BMI 25.85 kg/m²     General Appearance:    Awake, alert , no

## 2022-05-23 ENCOUNTER — APPOINTMENT (OUTPATIENT)
Dept: GENERAL RADIOLOGY | Age: 47
End: 2022-05-23
Payer: COMMERCIAL

## 2022-05-23 LAB — METER GLUCOSE: 335 MG/DL (ref 74–99)

## 2022-05-23 PROCEDURE — 93005 ELECTROCARDIOGRAM TRACING: CPT | Performed by: PHYSICIAN ASSISTANT

## 2022-05-23 PROCEDURE — 87635 SARS-COV-2 COVID-19 AMP PRB: CPT

## 2022-05-23 PROCEDURE — 87502 INFLUENZA DNA AMP PROBE: CPT

## 2022-05-23 PROCEDURE — 71046 X-RAY EXAM CHEST 2 VIEWS: CPT

## 2022-05-24 ENCOUNTER — HOSPITAL ENCOUNTER (EMERGENCY)
Age: 47
Discharge: HOME | End: 2022-05-24
Attending: EMERGENCY MEDICINE
Payer: COMMERCIAL

## 2022-05-24 VITALS
BODY MASS INDEX: 18.83 KG/M2 | OXYGEN SATURATION: 96 % | RESPIRATION RATE: 16 BRPM | SYSTOLIC BLOOD PRESSURE: 119 MMHG | WEIGHT: 120 LBS | HEART RATE: 98 BPM | TEMPERATURE: 97.6 F | HEIGHT: 67 IN | DIASTOLIC BLOOD PRESSURE: 69 MMHG

## 2022-05-24 DIAGNOSIS — R07.89 ATYPICAL CHEST PAIN: Primary | ICD-10-CM

## 2022-05-24 DIAGNOSIS — R20.2 TINGLING IN EXTREMITIES: ICD-10-CM

## 2022-05-24 LAB
ALBUMIN SERPL-MCNC: 4 G/DL (ref 3.5–5.2)
ALP BLD-CCNC: 129 U/L (ref 40–129)
ALT SERPL-CCNC: 24 U/L (ref 0–40)
ANION GAP SERPL CALCULATED.3IONS-SCNC: 15 MMOL/L (ref 7–16)
AST SERPL-CCNC: 21 U/L (ref 0–39)
BASOPHILS ABSOLUTE: 0.05 E9/L (ref 0–0.2)
BASOPHILS RELATIVE PERCENT: 0.7 % (ref 0–2)
BETA-HYDROXYBUTYRATE: 0.26 MMOL/L (ref 0.02–0.27)
BILIRUB SERPL-MCNC: 3.5 MG/DL (ref 0–1.2)
BILIRUBIN DIRECT: 0.3 MG/DL (ref 0–0.3)
BILIRUBIN, INDIRECT: 3.2 MG/DL (ref 0–1)
BUN BLDV-MCNC: 17 MG/DL (ref 6–20)
CALCIUM SERPL-MCNC: 9.7 MG/DL (ref 8.6–10.2)
CHLORIDE BLD-SCNC: 95 MMOL/L (ref 98–107)
CHP ED QC CHECK: YES
CHP ED QC CHECK: YES
CO2: 22 MMOL/L (ref 22–29)
CREAT SERPL-MCNC: 0.7 MG/DL (ref 0.7–1.2)
D DIMER: <200 NG/ML DDU
EKG ATRIAL RATE: 98 BPM
EKG P AXIS: 76 DEGREES
EKG P-R INTERVAL: 118 MS
EKG Q-T INTERVAL: 370 MS
EKG QRS DURATION: 108 MS
EKG QTC CALCULATION (BAZETT): 472 MS
EKG R AXIS: 71 DEGREES
EKG T AXIS: -82 DEGREES
EKG VENTRICULAR RATE: 98 BPM
EOSINOPHILS ABSOLUTE: 0.12 E9/L (ref 0.05–0.5)
EOSINOPHILS RELATIVE PERCENT: 1.7 % (ref 0–6)
GFR AFRICAN AMERICAN: >60
GFR NON-AFRICAN AMERICAN: >60 ML/MIN/1.73
GLUCOSE BLD-MCNC: 154 MG/DL
GLUCOSE BLD-MCNC: 410 MG/DL
GLUCOSE BLD-MCNC: 455 MG/DL (ref 74–99)
HCT VFR BLD CALC: 44.6 % (ref 37–54)
HEMOGLOBIN: 15.1 G/DL (ref 12.5–16.5)
IMMATURE GRANULOCYTES #: 0.02 E9/L
IMMATURE GRANULOCYTES %: 0.3 % (ref 0–5)
INFLUENZA A BY PCR: NOT DETECTED
INFLUENZA B BY PCR: NOT DETECTED
LYMPHOCYTES ABSOLUTE: 2.2 E9/L (ref 1.5–4)
LYMPHOCYTES RELATIVE PERCENT: 32 % (ref 20–42)
MCH RBC QN AUTO: 29 PG (ref 26–35)
MCHC RBC AUTO-ENTMCNC: 33.9 % (ref 32–34.5)
MCV RBC AUTO: 85.6 FL (ref 80–99.9)
METER GLUCOSE: 154 MG/DL (ref 74–99)
METER GLUCOSE: 410 MG/DL (ref 74–99)
MONOCYTES ABSOLUTE: 0.66 E9/L (ref 0.1–0.95)
MONOCYTES RELATIVE PERCENT: 9.6 % (ref 2–12)
NEUTROPHILS ABSOLUTE: 3.82 E9/L (ref 1.8–7.3)
NEUTROPHILS RELATIVE PERCENT: 55.7 % (ref 43–80)
PDW BLD-RTO: 14.1 FL (ref 11.5–15)
PLATELET # BLD: 350 E9/L (ref 130–450)
PMV BLD AUTO: 10.3 FL (ref 7–12)
POTASSIUM SERPL-SCNC: 4.4 MMOL/L (ref 3.5–5)
RBC # BLD: 5.21 E12/L (ref 3.8–5.8)
SARS-COV-2, NAAT: NOT DETECTED
SODIUM BLD-SCNC: 132 MMOL/L (ref 132–146)
TOTAL PROTEIN: 7.6 G/DL (ref 6.4–8.3)
TROPONIN, HIGH SENSITIVITY: 9 NG/L (ref 0–11)
WBC # BLD: 6.9 E9/L (ref 4.5–11.5)

## 2022-05-24 PROCEDURE — 82010 KETONE BODYS QUAN: CPT

## 2022-05-24 PROCEDURE — 82962 GLUCOSE BLOOD TEST: CPT

## 2022-05-24 PROCEDURE — 85378 FIBRIN DEGRADE SEMIQUANT: CPT

## 2022-05-24 PROCEDURE — 80048 BASIC METABOLIC PNL TOTAL CA: CPT

## 2022-05-24 PROCEDURE — 99285 EMERGENCY DEPT VISIT HI MDM: CPT

## 2022-05-24 PROCEDURE — 84484 ASSAY OF TROPONIN QUANT: CPT

## 2022-05-24 PROCEDURE — 6370000000 HC RX 637 (ALT 250 FOR IP): Performed by: EMERGENCY MEDICINE

## 2022-05-24 PROCEDURE — 96374 THER/PROPH/DIAG INJ IV PUSH: CPT

## 2022-05-24 PROCEDURE — 85025 COMPLETE CBC W/AUTO DIFF WBC: CPT

## 2022-05-24 PROCEDURE — 80076 HEPATIC FUNCTION PANEL: CPT

## 2022-05-24 RX ADMIN — INSULIN HUMAN 10 UNITS: 100 INJECTION, SOLUTION PARENTERAL at 05:24

## 2022-05-24 ASSESSMENT — ENCOUNTER SYMPTOMS
NAUSEA: 0
SINUS PAIN: 0
ABDOMINAL PAIN: 0
WHEEZING: 0
SORE THROAT: 0
DIARRHEA: 0
VOMITING: 0
SHORTNESS OF BREATH: 0
EYE REDNESS: 0
BACK PAIN: 0
COUGH: 0
EYE PAIN: 0

## 2022-05-24 NOTE — ED PROVIDER NOTES
Chief Complaint   Patient presents with    Chest Pain     intermittent for 2 days, tingling to left arm and both feet       50-year-old gentleman with past medical history of HIV, hypertension, diabetes presenting today with complaints of worsening tingling in his fingers and toes, chest pain, shortness of breath, headache and fatigue. It has been ongoing for several days, nothing makes it better or worse, not associated with fevers, chills, cough. He has not had any new rashes, no nausea vomiting or diarrhea, no abdominal pain. He has been off of his HIV medication for at least the past 3 months, he says that he has been partying too much since his father . Admits to alcohol, tobacco, recreational drug abuse. No other acute complaints. Review of Systems   Constitutional: Negative for chills and fever. HENT: Negative for ear pain, sinus pain and sore throat. Eyes: Negative for pain and redness. Respiratory: Negative for cough, shortness of breath and wheezing. Cardiovascular: Negative for chest pain. Gastrointestinal: Negative for abdominal pain, diarrhea, nausea and vomiting. Genitourinary: Negative for dysuria and flank pain. Musculoskeletal: Negative for back pain and neck pain. Skin: Negative for rash. Neurological: Negative for seizures and headaches. Hematological: Negative for adenopathy. All other systems reviewed and are negative. Physical Exam     Procedures     EKG: This EKG is signed and interpreted by me.     Rate: 98  Rhythm: Sinus  Interpretation: no acute changes  Comparison: stable as compared to patient's most recent EKG      MDM  Number of Diagnoses or Management Options  Atypical chest pain  Tingling in extremities  Diagnosis management comments: 50-year-old gentleman with past medical history of HIV, hypertension, diabetes presented with complaints of worsening tingling in his fingers and toes, chest pain, shortness of breath, headaches and fatigue. He is hemodynamically stable throughout his emergency department. Lab work did not demonstrate any acute abnormalities, his liver function was within normal limits, kidneys were within normal limits, no evidence of infectious pathology. Troponin and EKG unremarkable as well. Discussed with patient that the best thing for him will be to find a new primary care doctor to restart his HIV therapy medications, he verbalized understanding the plan. He was also requesting resources for rehab and detox and a discussion was had about such things in addition to him being provided with resources at discharge. He was given return precautions and strongly encouraged to follow-up with primary. ED Course as of 05/24/22 0635   Tue May 24, 2022   0401 Discussed results of labs new to the patient he verbalized understanding, encouraged the importance of following up with her primary care physician to restart his HIV medications. [MM]      ED Course User Index  [MM] Neela García DO      ED Course as of 05/24/22 5404   Tue May 24, 2022   0401 Discussed results of labs new to the patient he verbalized understanding, encouraged the importance of following up with her primary care physician to restart his HIV medications. [MM]      ED Course User Index  [MM] Neela García DO       --------------------------------------------- PAST HISTORY ---------------------------------------------  Past Medical History:  has a past medical history of Diabetes mellitus ([facility] HIV (human immunodeficiency virus infection) ([facility] [address] Hypertension, and Neuropathy. Past Surgical History:  has no past surgical history on file. Social History:  reports that he has been smoking cigarettes. He has a 40.00 pack-year smoking history. He has never used smokeless tobacco. He reports current alcohol use. He reports current drug use. Drugs: Marijuana (Weed) and Cocaine.     Family History: family history includes Diabetes in his mother; Prostate Cancer in his father. The patients home medications have been reviewed.     Allergies: Bee venom, Ibuprofen, and Nutritional supplements    -------------------------------------------------- RESULTS -------------------------------------------------  Labs:  Results for orders placed or performed during the hospital encounter of 05/24/22   COVID-19, Rapid    Specimen: Nasopharyngeal Swab   Result Value Ref Range    SARS-CoV-2, NAAT Not Detected Not Detected   Rapid influenza A/B antigens    Specimen: Nasopharyngeal   Result Value Ref Range    Influenza A by PCR Not Detected Not Detected    Influenza B by PCR Not Detected Not Detected   CBC with Auto Differential   Result Value Ref Range    WBC 6.9 4.5 - 11.5 E9/L    RBC 5.21 3.80 - 5.80 E12/L    Hemoglobin 15.1 12.5 - 16.5 g/dL    Hematocrit 44.6 37.0 - 54.0 %    MCV 85.6 80.0 - 99.9 fL    MCH 29.0 26.0 - 35.0 pg    MCHC 33.9 32.0 - 34.5 %    RDW 14.1 11.5 - 15.0 fL    Platelets 586 379 - 276 E9/L    MPV 10.3 7.0 - 12.0 fL    Neutrophils % 55.7 43.0 - 80.0 %    Immature Granulocytes % 0.3 0.0 - 5.0 %    Lymphocytes % 32.0 20.0 - 42.0 %    Monocytes % 9.6 2.0 - 12.0 %    Eosinophils % 1.7 0.0 - 6.0 %    Basophils % 0.7 0.0 - 2.0 %    Neutrophils Absolute 3.82 1.80 - 7.30 E9/L    Immature Granulocytes # 0.02 E9/L    Lymphocytes Absolute 2.20 1.50 - 4.00 E9/L    Monocytes Absolute 0.66 0.10 - 0.95 E9/L    Eosinophils Absolute 0.12 0.05 - 0.50 E9/L    Basophils Absolute 0.05 0.00 - 0.20 W2/Z   Basic Metabolic Panel   Result Value Ref Range    Sodium 132 132 - 146 mmol/L    Potassium 4.4 3.5 - 5.0 mmol/L    Chloride 95 (L) 98 - 107 mmol/L    CO2 22 22 - 29 mmol/L    Anion Gap 15 7 - 16 mmol/L    Glucose 455 (H) 74 - 99 mg/dL    BUN 17 6 - 20 mg/dL    CREATININE 0.7 0.7 - 1.2 mg/dL    GFR Non-African American >60 >=60 mL/min/1.73    GFR African American >60     Calcium 9.7 8.6 - 10.2 mg/dL   Hepatic Function Panel   Result Value Ref Range    Total Protein 7.6 6.4 - 8.3 g/dL    Albumin 4.0 3.5 - 5.2 g/dL    Alkaline Phosphatase 129 40 - 129 U/L    ALT 24 0 - 40 U/L    AST 21 0 - 39 U/L    Total Bilirubin 3.5 (H) 0.0 - 1.2 mg/dL    Bilirubin, Direct 0.3 0.0 - 0.3 mg/dL    Bilirubin, Indirect 3.2 (H) 0.0 - 1.0 mg/dL   Troponin   Result Value Ref Range    Troponin, High Sensitivity 9 0 - 11 ng/L   Beta-Hydroxybutyrate   Result Value Ref Range    Beta-Hydroxybutyrate 0.26 0.02 - 0.27 mmol/L   D-Dimer, Quantitative   Result Value Ref Range    D-Dimer, Quant <200 ng/mL DDU   POCT Glucose   Result Value Ref Range    Meter Glucose 335 (H) 74 - 99 mg/dL   POCT glucose   Result Value Ref Range    Glucose 410 mg/dL    QC OK? yes    POCT Glucose   Result Value Ref Range    Meter Glucose 410 (H) 74 - 99 mg/dL   POCT glucose   Result Value Ref Range    Glucose 154 mg/dL    QC OK? yes    POCT Glucose   Result Value Ref Range    Meter Glucose 154 (H) 74 - 99 mg/dL   EKG 12 Lead   Result Value Ref Range    Ventricular Rate 98 BPM    Atrial Rate 98 BPM    P-R Interval 118 ms    QRS Duration 108 ms    Q-T Interval 370 ms    QTc Calculation (Bazett) 472 ms    P Axis 76 degrees    R Axis 71 degrees    T Axis -82 degrees       Radiology:  XR CHEST (2 VW)   Final Result   There appear to be COPD changes. Atherosclerotic disease. No acute   cardiopulmonary pathology. ------------------------- NURSING NOTES AND VITALS REVIEWED ---------------------------  Date / Time Roomed:  5/24/2022 12:54 AM  ED Bed Assignment:  28/28    The nursing notes within the ED encounter and vital signs as below have been reviewed.    /69   Pulse 98   Temp 97.6 °F (36.4 °C) (Oral)   Resp 16   Ht 5' 7\" (1.702 m)   Wt 120 lb (54.4 kg)   SpO2 96%   BMI 18.79 kg/m²   Oxygen Saturation Interpretation: Normal      ------------------------------------------ PROGRESS NOTES ------------------------------------------  I have spoken with the patient and discussed todays results, in addition to providing specific details for the plan of care and counseling regarding the diagnosis and prognosis. Their questions are answered at this time and they are agreeable with the plan. I discussed at length with them reasons for immediate return here for re evaluation. They will followup with primary care by calling their office tomorrow. --------------------------------- ADDITIONAL PROVIDER NOTES ---------------------------------  At this time the patient is without objective evidence of an acute process requiring hospitalization or inpatient management. They have remained hemodynamically stable throughout their entire ED visit and are stable for discharge with outpatient follow-up. The plan has been discussed in detail and they are aware of the specific conditions for emergent return, as well as the importance of follow-up. Discharge Medication List as of 5/24/2022  4:04 AM          Diagnosis:  1. Atypical chest pain    2. Tingling in extremities        Disposition:  Patient's disposition: Discharge to home  Patient's condition is stable.            Erica Aviles DO  Resident  05/24/22 0221

## 2022-05-24 NOTE — ED NOTES
Department of Emergency Medicine  FIRST PROVIDER TRIAGE NOTE             Independent MLP           5/23/22  8:34 PM EDT    Date of Encounter: 5/23/22   MRN: [id number]      HPI: Teresita Dumas is a 49 y.o. male who presents to the ED for Chest Pain (intermittent for 2 days, tingling to left arm and both feet)  Pt presents to ED with chest pain, shortness of breath, cough, feeling like \"crap\" for 2 days. Pt reports stinging in his feet, which is usual for him with diabetes. He denies fevers at home. Hx of HIV.    ROS: Negative for abd pain, back pain, fever, vomiting or diarrhea. PE: Gen Appearance/Constitutional: alert  CV: regular rate  Pulm: CTA bilat     Initial Plan of Care: All treatment areas with department are currently occupied. Plan to order/Initiate the following while awaiting opening in ED: labs, EKG, imaging studies and IV fluids.   Initiate Treatment-Testing, Proceed toTreatment Area When Bed Available for ED Attending/MLP to Continue Care    Electronically signed by Prasanth Escobar PA-C   DD: 5/23/22         Prasanth Escobar PA-C  05/23/22 2035

## 2022-05-24 NOTE — ED NOTES
Pt is unable to give a urine specimen at this time. This RN educated pt on the importance of the specimen. Urinal and specimen cup are at the bedside.      Fatimah Rosado RN  05/24/22 1599

## 2022-05-24 NOTE — ED NOTES
Patient was called three times to go to room and no answer. Lobby and bathrooms were checked.      Darlene Held  05/24/22 9087

## 2023-01-26 ENCOUNTER — HOSPITAL ENCOUNTER (EMERGENCY)
Age: 48
Discharge: OTHER INSTITUTION | End: 2023-01-27
Attending: STUDENT IN AN ORGANIZED HEALTH CARE EDUCATION/TRAINING PROGRAM
Payer: COMMERCIAL

## 2023-01-26 DIAGNOSIS — F99 MENTAL HEALTH DISORDER: Primary | ICD-10-CM

## 2023-01-26 DIAGNOSIS — R73.9 HYPERGLYCEMIA: ICD-10-CM

## 2023-01-26 DIAGNOSIS — F11.11 HISTORY OF OPIOID ABUSE (HCC): ICD-10-CM

## 2023-01-26 LAB
ACETAMINOPHEN LEVEL: <5 MCG/ML (ref 10–30)
ALBUMIN SERPL-MCNC: 3.6 G/DL (ref 3.5–5.2)
ALP BLD-CCNC: 103 U/L (ref 40–129)
ALT SERPL-CCNC: 37 U/L (ref 0–40)
AMPHETAMINE SCREEN, URINE: NOT DETECTED
ANION GAP SERPL CALCULATED.3IONS-SCNC: 14 MMOL/L (ref 7–16)
AST SERPL-CCNC: 26 U/L (ref 0–39)
BACTERIA: ABNORMAL /HPF
BARBITURATE SCREEN URINE: NOT DETECTED
BASOPHILS ABSOLUTE: 0.07 E9/L (ref 0–0.2)
BASOPHILS RELATIVE PERCENT: 1.1 % (ref 0–2)
BENZODIAZEPINE SCREEN, URINE: NOT DETECTED
BILIRUB SERPL-MCNC: 0.7 MG/DL (ref 0–1.2)
BILIRUBIN URINE: NEGATIVE
BLOOD, URINE: ABNORMAL
BUN BLDV-MCNC: 13 MG/DL (ref 6–20)
CALCIUM SERPL-MCNC: 9.1 MG/DL (ref 8.6–10.2)
CANNABINOID SCREEN URINE: POSITIVE
CHLORIDE BLD-SCNC: 94 MMOL/L (ref 98–107)
CHP ED QC CHECK: NORMAL
CLARITY: CLEAR
CO2: 24 MMOL/L (ref 22–29)
COCAINE METABOLITE SCREEN URINE: POSITIVE
COLOR: YELLOW
CREAT SERPL-MCNC: 0.7 MG/DL (ref 0.7–1.2)
EKG ATRIAL RATE: 71 BPM
EKG P AXIS: 74 DEGREES
EKG P-R INTERVAL: 116 MS
EKG Q-T INTERVAL: 454 MS
EKG QRS DURATION: 112 MS
EKG QTC CALCULATION (BAZETT): 493 MS
EKG R AXIS: 69 DEGREES
EKG T AXIS: 83 DEGREES
EKG VENTRICULAR RATE: 71 BPM
EOSINOPHILS ABSOLUTE: 0.12 E9/L (ref 0.05–0.5)
EOSINOPHILS RELATIVE PERCENT: 1.9 % (ref 0–6)
ETHANOL: <10 MG/DL (ref 0–0.08)
FENTANYL SCREEN, URINE: NOT DETECTED
GFR SERPL CREATININE-BSD FRML MDRD: >60 ML/MIN/1.73
GLUCOSE BLD-MCNC: 378 MG/DL (ref 74–99)
GLUCOSE BLD-MCNC: 397 MG/DL
GLUCOSE URINE: >=1000 MG/DL
HCT VFR BLD CALC: 43.3 % (ref 37–54)
HEMOGLOBIN: 14.3 G/DL (ref 12.5–16.5)
IMMATURE GRANULOCYTES #: 0.02 E9/L
IMMATURE GRANULOCYTES %: 0.3 % (ref 0–5)
INFLUENZA A: NOT DETECTED
INFLUENZA B: NOT DETECTED
KETONES, URINE: ABNORMAL MG/DL
LEUKOCYTE ESTERASE, URINE: ABNORMAL
LYMPHOCYTES ABSOLUTE: 1.61 E9/L (ref 1.5–4)
LYMPHOCYTES RELATIVE PERCENT: 25.3 % (ref 20–42)
Lab: ABNORMAL
MCH RBC QN AUTO: 27.5 PG (ref 26–35)
MCHC RBC AUTO-ENTMCNC: 33 % (ref 32–34.5)
MCV RBC AUTO: 83.3 FL (ref 80–99.9)
METER GLUCOSE: 230 MG/DL (ref 74–99)
METER GLUCOSE: 348 MG/DL (ref 74–99)
METER GLUCOSE: 397 MG/DL (ref 74–99)
METER GLUCOSE: 436 MG/DL (ref 74–99)
METHADONE SCREEN, URINE: NOT DETECTED
MONOCYTES ABSOLUTE: 0.5 E9/L (ref 0.1–0.95)
MONOCYTES RELATIVE PERCENT: 7.9 % (ref 2–12)
NEUTROPHILS ABSOLUTE: 4.04 E9/L (ref 1.8–7.3)
NEUTROPHILS RELATIVE PERCENT: 63.5 % (ref 43–80)
NITRITE, URINE: NEGATIVE
OPIATE SCREEN URINE: NOT DETECTED
OXYCODONE URINE: NOT DETECTED
PDW BLD-RTO: 12.7 FL (ref 11.5–15)
PH UA: 7 (ref 5–9)
PHENCYCLIDINE SCREEN URINE: NOT DETECTED
PLATELET # BLD: 364 E9/L (ref 130–450)
PMV BLD AUTO: 10.8 FL (ref 7–12)
POTASSIUM SERPL-SCNC: 4.2 MMOL/L (ref 3.5–5)
PROTEIN UA: ABNORMAL MG/DL
RBC # BLD: 5.2 E12/L (ref 3.8–5.8)
RBC UA: ABNORMAL /HPF (ref 0–2)
SALICYLATE, SERUM: <0.3 MG/DL (ref 0–30)
SARS-COV-2 RNA, RT PCR: NOT DETECTED
SODIUM BLD-SCNC: 132 MMOL/L (ref 132–146)
SPECIFIC GRAVITY UA: 1.01 (ref 1–1.03)
TOTAL CK: 87 U/L (ref 20–200)
TOTAL PROTEIN: 7.8 G/DL (ref 6.4–8.3)
TRICYCLIC ANTIDEPRESSANTS SCREEN SERUM: NEGATIVE NG/ML
UROBILINOGEN, URINE: 0.2 E.U./DL
WBC # BLD: 6.4 E9/L (ref 4.5–11.5)
WBC UA: ABNORMAL /HPF (ref 0–5)

## 2023-01-26 PROCEDURE — 82962 GLUCOSE BLOOD TEST: CPT

## 2023-01-26 PROCEDURE — 99284 EMERGENCY DEPT VISIT MOD MDM: CPT

## 2023-01-26 PROCEDURE — 82550 ASSAY OF CK (CPK): CPT

## 2023-01-26 PROCEDURE — 93005 ELECTROCARDIOGRAM TRACING: CPT | Performed by: PHYSICIAN ASSISTANT

## 2023-01-26 PROCEDURE — 80053 COMPREHEN METABOLIC PANEL: CPT

## 2023-01-26 PROCEDURE — 36415 COLL VENOUS BLD VENIPUNCTURE: CPT

## 2023-01-26 PROCEDURE — 93010 ELECTROCARDIOGRAM REPORT: CPT | Performed by: INTERNAL MEDICINE

## 2023-01-26 PROCEDURE — 80143 DRUG ASSAY ACETAMINOPHEN: CPT

## 2023-01-26 PROCEDURE — 82077 ASSAY SPEC XCP UR&BREATH IA: CPT

## 2023-01-26 PROCEDURE — 87636 SARSCOV2 & INF A&B AMP PRB: CPT

## 2023-01-26 PROCEDURE — 85025 COMPLETE CBC W/AUTO DIFF WBC: CPT

## 2023-01-26 PROCEDURE — 81001 URINALYSIS AUTO W/SCOPE: CPT

## 2023-01-26 PROCEDURE — 80307 DRUG TEST PRSMV CHEM ANLYZR: CPT

## 2023-01-26 PROCEDURE — 96372 THER/PROPH/DIAG INJ SC/IM: CPT

## 2023-01-26 PROCEDURE — 6370000000 HC RX 637 (ALT 250 FOR IP): Performed by: STUDENT IN AN ORGANIZED HEALTH CARE EDUCATION/TRAINING PROGRAM

## 2023-01-26 PROCEDURE — 80179 DRUG ASSAY SALICYLATE: CPT

## 2023-01-26 RX ORDER — LORAZEPAM 1 MG/1
4 TABLET ORAL
Status: DISCONTINUED | OUTPATIENT
Start: 2023-01-26 | End: 2023-01-27 | Stop reason: HOSPADM

## 2023-01-26 RX ORDER — DEXTROSE MONOHYDRATE 100 MG/ML
INJECTION, SOLUTION INTRAVENOUS CONTINUOUS PRN
Status: DISCONTINUED | OUTPATIENT
Start: 2023-01-26 | End: 2023-01-27 | Stop reason: HOSPADM

## 2023-01-26 RX ORDER — LORAZEPAM 2 MG/ML
4 INJECTION INTRAMUSCULAR
Status: DISCONTINUED | OUTPATIENT
Start: 2023-01-26 | End: 2023-01-27 | Stop reason: HOSPADM

## 2023-01-26 RX ORDER — LANOLIN ALCOHOL/MO/W.PET/CERES
100 CREAM (GRAM) TOPICAL DAILY
Status: DISCONTINUED | OUTPATIENT
Start: 2023-01-26 | End: 2023-01-27 | Stop reason: HOSPADM

## 2023-01-26 RX ORDER — LORAZEPAM 1 MG/1
1 TABLET ORAL
Status: DISCONTINUED | OUTPATIENT
Start: 2023-01-26 | End: 2023-01-27 | Stop reason: HOSPADM

## 2023-01-26 RX ORDER — LORAZEPAM 2 MG/ML
3 INJECTION INTRAMUSCULAR
Status: DISCONTINUED | OUTPATIENT
Start: 2023-01-26 | End: 2023-01-27 | Stop reason: HOSPADM

## 2023-01-26 RX ORDER — LORAZEPAM 2 MG/ML
2 INJECTION INTRAMUSCULAR
Status: DISCONTINUED | OUTPATIENT
Start: 2023-01-26 | End: 2023-01-27 | Stop reason: HOSPADM

## 2023-01-26 RX ORDER — SODIUM CHLORIDE 9 MG/ML
INJECTION, SOLUTION INTRAVENOUS PRN
Status: DISCONTINUED | OUTPATIENT
Start: 2023-01-26 | End: 2023-01-27 | Stop reason: HOSPADM

## 2023-01-26 RX ORDER — LORAZEPAM 1 MG/1
3 TABLET ORAL
Status: DISCONTINUED | OUTPATIENT
Start: 2023-01-26 | End: 2023-01-27 | Stop reason: HOSPADM

## 2023-01-26 RX ORDER — INSULIN LISPRO 100 [IU]/ML
0-4 INJECTION, SOLUTION INTRAVENOUS; SUBCUTANEOUS NIGHTLY
Status: DISCONTINUED | OUTPATIENT
Start: 2023-01-26 | End: 2023-01-27 | Stop reason: HOSPADM

## 2023-01-26 RX ORDER — LORAZEPAM 1 MG/1
2 TABLET ORAL
Status: DISCONTINUED | OUTPATIENT
Start: 2023-01-26 | End: 2023-01-27 | Stop reason: HOSPADM

## 2023-01-26 RX ORDER — SODIUM CHLORIDE 0.9 % (FLUSH) 0.9 %
5-40 SYRINGE (ML) INJECTION PRN
Status: DISCONTINUED | OUTPATIENT
Start: 2023-01-26 | End: 2023-01-27 | Stop reason: HOSPADM

## 2023-01-26 RX ORDER — INSULIN LISPRO 100 [IU]/ML
0-8 INJECTION, SOLUTION INTRAVENOUS; SUBCUTANEOUS
Status: DISCONTINUED | OUTPATIENT
Start: 2023-01-26 | End: 2023-01-27 | Stop reason: HOSPADM

## 2023-01-26 RX ORDER — SODIUM CHLORIDE 0.9 % (FLUSH) 0.9 %
5-40 SYRINGE (ML) INJECTION EVERY 12 HOURS SCHEDULED
Status: DISCONTINUED | OUTPATIENT
Start: 2023-01-26 | End: 2023-01-27 | Stop reason: HOSPADM

## 2023-01-26 RX ORDER — LORAZEPAM 2 MG/ML
1 INJECTION INTRAMUSCULAR
Status: DISCONTINUED | OUTPATIENT
Start: 2023-01-26 | End: 2023-01-27 | Stop reason: HOSPADM

## 2023-01-26 RX ADMIN — Medication 100 MG: at 15:07

## 2023-01-26 RX ADMIN — INSULIN LISPRO 6 UNITS: 100 INJECTION, SOLUTION INTRAVENOUS; SUBCUTANEOUS at 13:49

## 2023-01-26 RX ADMIN — INSULIN LISPRO 2 UNITS: 100 INJECTION, SOLUTION INTRAVENOUS; SUBCUTANEOUS at 17:20

## 2023-01-26 RX ADMIN — INSULIN LISPRO 4 UNITS: 100 INJECTION, SOLUTION INTRAVENOUS; SUBCUTANEOUS at 21:57

## 2023-01-26 ASSESSMENT — ENCOUNTER SYMPTOMS
PHOTOPHOBIA: 0
DIARRHEA: 0
CHEST TIGHTNESS: 0
NAUSEA: 0
VOMITING: 0
SHORTNESS OF BREATH: 0
ABDOMINAL PAIN: 0
COUGH: 0
BACK PAIN: 0
ABDOMINAL DISTENTION: 0

## 2023-01-26 ASSESSMENT — PAIN DESCRIPTION - ORIENTATION: ORIENTATION: RIGHT;LEFT

## 2023-01-26 ASSESSMENT — PAIN DESCRIPTION - DESCRIPTORS: DESCRIPTORS: TINGLING

## 2023-01-26 ASSESSMENT — LIFESTYLE VARIABLES
HOW MANY STANDARD DRINKS CONTAINING ALCOHOL DO YOU HAVE ON A TYPICAL DAY: 10 OR MORE
HOW OFTEN DO YOU HAVE A DRINK CONTAINING ALCOHOL: 4 OR MORE TIMES A WEEK

## 2023-01-26 ASSESSMENT — PAIN SCALES - GENERAL: PAINLEVEL_OUTOF10: 10

## 2023-01-26 ASSESSMENT — PAIN DESCRIPTION - PAIN TYPE: TYPE: CHRONIC PAIN

## 2023-01-26 ASSESSMENT — PAIN - FUNCTIONAL ASSESSMENT: PAIN_FUNCTIONAL_ASSESSMENT: NONE - DENIES PAIN

## 2023-01-26 ASSESSMENT — PAIN DESCRIPTION - LOCATION: LOCATION: FOOT

## 2023-01-26 NOTE — ED NOTES
KACY NUNEZ called [facility] and spoke to [facility] at the answering service. [facility] took info and will have someone call back to gather more info and for possible placement. KACY NUNEZ called [employer] after hour line @ [phone], no answer, voice message left.       Erica Hedrick, [facility]  01/26/23 8550

## 2023-01-26 NOTE — ED NOTES
Pt completed telephone assessment with [facility]. Pt was told to call back at 8:00am to see if a bed is available.      [facility] will continue to look for AoD treatment for pt       Jimenez Javed  01/26/23 7184

## 2023-01-26 NOTE — ED NOTES
Pt is on the phone with Aisha from [facility] doing a telephone assessment      Jennifer Ernandez, EARL  01/26/23 2836

## 2023-01-26 NOTE — ED NOTES
SW spoke to EMS and was informed Pt called himself and requested for a psych eval. Pt denies to SI/HI. Pt does admit to no outpatient [place] [address] services or meds. Pt is diabetic and takes insulin and also admit to being HIV positive and not taking any medication at this time. Pt did admit to using crack recently. Pt has been calm and cooperative.       FRANCISCO Rueda, [facility]  01/26/23 [address], MSW, [facility]  01/26/23 1222
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
## 2023-01-26 NOTE — CARE COORDINATION
[facility] Support Note    Name: Gerry Ortiz  Date: 1/26/2023    Chief Complaint   Patient presents with    Other     Original call was for high glucose (324), Patent then stated at his breaking point, (-SI/ HI)    Psychiatric Evaluation       Peer Support met with patient. [] Support and education provided  [] Resources provided   [] Treatment referral:   [x] Other:   [] Patient declined peer recovery services     Referred By: Jo Moy (ENRIQUE)    Notes: [facility] has no beds, ENRIQUE was notified.  If patient is discharged my next option would be [facility] ([phone])      Signed: Marcos Berman, 1/26/2023

## 2023-01-26 NOTE — ED PROVIDER NOTES
Myriam Rainey is a 48-year-old male present emergency department with concern for mental evaluation. Patient states he has history of HIV and history of diabetes. Patient is currently taking his insulin. Patient states that he has not been on his antiviral medication. Patient states that he has been very stressed recently and feels that he has had a point where he is becoming more depressed patient denies any current SI or HI patient has not attempted SI patient came to emergency department seeking help for placement in rehab. The history is provided by the patient and medical records. Review of Systems   Constitutional:  Negative for chills, diaphoresis, fatigue and fever. Eyes:  Negative for photophobia and visual disturbance. Respiratory:  Negative for cough, chest tightness and shortness of breath. Cardiovascular:  Negative for chest pain, palpitations and leg swelling. Gastrointestinal:  Negative for abdominal distention, abdominal pain, diarrhea, nausea and vomiting. Genitourinary:  Negative for dysuria. Musculoskeletal:  Negative for back pain, neck pain and neck stiffness. Skin:  Negative for pallor and rash. Neurological:  Negative for headaches. Psychiatric/Behavioral:  Negative for confusion, self-injury and suicidal ideas. Physical Exam  Vitals and nursing note reviewed. Constitutional:       General: He is not in acute distress. Appearance: He is ill-appearing. Comments: Chronically ill in appearance   HENT:      Head: Normocephalic and atraumatic. Eyes:      General: No scleral icterus. Conjunctiva/sclera: Conjunctivae normal.      Pupils: Pupils are equal, round, and reactive to light. Cardiovascular:      Rate and Rhythm: Normal rate and regular rhythm. Pulmonary:      Effort: Pulmonary effort is normal.      Breath sounds: Normal breath sounds. Abdominal:      General: Bowel sounds are normal. There is no distension.       Palpations: Abdomen is soft. Tenderness: There is no abdominal tenderness. There is no guarding or rebound. Musculoskeletal:      Cervical back: Normal range of motion and neck supple. No rigidity. No muscular tenderness. Right lower leg: No edema. Left lower leg: No edema. Skin:     General: Skin is warm and dry. Capillary Refill: Capillary refill takes less than 2 seconds. Coloration: Skin is not pale. Findings: No erythema or rash. Neurological:      Mental Status: He is alert and oriented to person, place, and time. Psychiatric:         Mood and Affect: Mood normal.        Procedures     MDM  Number of Diagnoses or Management Options  Diagnosis management comments: Brooke Vargas is a 53-year-old male presented emergency department with concern for mental health evaluation. Patient is chronically ill in appearance  Reported he does have insulin available and has been taking his insulin. Patient did have a positive drug screen and was found to have hyperglycemia but not in DKA. Patient lab work was reviewed and interpreted. Patient denies chest pain or shortness of breath  Patient does not currently have a PCP and has a history of HIV which are risk factors for patient. Patient was not pink slipped patient not having SI or HI. Patient is medically cleared for disposition per social work  Case was discussed with patient with social work  Plan will be to look for placement for patient  Social determinants of health include HIV, mental health disorder, drug abuse, diabetes, no PCP  Differential diagnosis includes but is not limited to DKA, hyperglycemia, psychosis, depression                --------------------------------------------- PAST HISTORY ---------------------------------------------  Past Medical History:  has a past medical history of Diabetes mellitus ([facility] [address] [facility] Hypertension, and Neuropathy.     Past Surgical History: has no past surgical history on file. Social History:  reports that he has been smoking cigarettes. He has a 20.00 pack-year smoking history. He has never used smokeless tobacco. He reports current alcohol use of about 12.0 standard drinks per week. He reports current drug use. Drugs: Marijuana (Weed) and Cocaine. Family History: family history includes Diabetes in his mother; Prostate Cancer in his father. The patients home medications have been reviewed.     Allergies: Bee venom, Ibuprofen, and Nutritional supplements    -------------------------------------------------- RESULTS -------------------------------------------------    LABS:  Results for orders placed or performed during the hospital encounter of 01/26/23   COVID-19 & Influenza Combo    Specimen: Nasopharyngeal Swab   Result Value Ref Range    SARS-CoV-2 RNA, RT PCR NOT DETECTED NOT DETECTED    INFLUENZA A NOT DETECTED NOT DETECTED    INFLUENZA B NOT DETECTED NOT DETECTED   Comprehensive Metabolic Panel   Result Value Ref Range    Sodium 132 132 - 146 mmol/L    Potassium 4.2 3.5 - 5.0 mmol/L    Chloride 94 (L) 98 - 107 mmol/L    CO2 24 22 - 29 mmol/L    Anion Gap 14 7 - 16 mmol/L    Glucose 378 (H) 74 - 99 mg/dL    BUN 13 6 - 20 mg/dL    Creatinine 0.7 0.7 - 1.2 mg/dL    Est, Glom Filt Rate >60 >=60 mL/min/1.73    Calcium 9.1 8.6 - 10.2 mg/dL    Total Protein 7.8 6.4 - 8.3 g/dL    Albumin 3.6 3.5 - 5.2 g/dL    Total Bilirubin 0.7 0.0 - 1.2 mg/dL    Alkaline Phosphatase 103 40 - 129 U/L    ALT 37 0 - 40 U/L    AST 26 0 - 39 U/L   CBC with Auto Differential   Result Value Ref Range    WBC 6.4 4.5 - 11.5 E9/L    RBC 5.20 3.80 - 5.80 E12/L    Hemoglobin 14.3 12.5 - 16.5 g/dL    Hematocrit 43.3 37.0 - 54.0 %    MCV 83.3 80.0 - 99.9 fL    MCH 27.5 26.0 - 35.0 pg    MCHC 33.0 32.0 - 34.5 %    RDW 12.7 11.5 - 15.0 fL    Platelets 747 061 - 941 E9/L    MPV 10.8 7.0 - 12.0 fL    Neutrophils % 63.5 43.0 - 80.0 %    Immature Granulocytes % 0.3 0.0 - 5.0 %    Lymphocytes % 25.3 20.0 - 42.0 %    Monocytes % 7.9 2.0 - 12.0 %    Eosinophils % 1.9 0.0 - 6.0 %    Basophils % 1.1 0.0 - 2.0 %    Neutrophils Absolute 4.04 1.80 - 7.30 E9/L    Immature Granulocytes # 0.02 E9/L    Lymphocytes Absolute 1.61 1.50 - 4.00 E9/L    Monocytes Absolute 0.50 0.10 - 0.95 E9/L    Eosinophils Absolute 0.12 0.05 - 0.50 E9/L    Basophils Absolute 0.07 0.00 - 0.20 E9/L   Serum Drug Screen   Result Value Ref Range    Ethanol Lvl <10 mg/dL    Acetaminophen Level <5.0 (L) 10.0 - 26.2 mcg/mL    Salicylate, Serum <9.1 0.0 - 30.0 mg/dL   Urine Drug Screen   Result Value Ref Range    Amphetamine Screen, Urine NOT DETECTED Negative <1000 ng/mL    Barbiturate Screen, Ur NOT DETECTED Negative < 200 ng/mL    Benzodiazepine Screen, Urine NOT DETECTED Negative < 200 ng/mL    Cannabinoid Scrn, Ur POSITIVE (A) Negative < 50ng/mL    Cocaine Metabolite Screen, Urine POSITIVE (A) Negative < 300 ng/mL    Opiate Scrn, Ur NOT DETECTED Negative < 300ng/mL    PCP Screen, Urine NOT DETECTED Negative < 25 ng/mL    Methadone Screen, Urine NOT DETECTED Negative <300 ng/mL    Oxycodone Urine NOT DETECTED Negative <100 ng/mL    FENTANYL SCREEN, URINE NOT DETECTED Negative <1 ng/mL    Drug Screen Comment: see below    Urinalysis with Microscopic   Result Value Ref Range    Color, UA Yellow Straw/Yellow    Clarity, UA Clear Clear    Glucose, Ur >=1000 (A) Negative mg/dL    Bilirubin Urine Negative Negative    Ketones, Urine TRACE (A) Negative mg/dL    Specific Gravity, UA 1.015 1.005 - 1.030    Blood, Urine TRACE-INTACT Negative    pH, UA 7.0 5.0 - 9.0    Protein, UA TRACE Negative mg/dL    Urobilinogen, Urine 0.2 <2.0 E.U./dL    Nitrite, Urine Negative Negative    Leukocyte Esterase, Urine TRACE (A) Negative    WBC, UA 2-5 0 - 5 /HPF    RBC, UA 2-5 0 - 2 /HPF    Bacteria, UA NONE SEEN None Seen /HPF   CK   Result Value Ref Range    Total CK 87 20 - 200 U/L   POCT GLUCOSE   Result Value Ref Range    Glucose 397 mg/dL QC OK? y    POCT Glucose   Result Value Ref Range    Meter Glucose 397 (H) 74 - 99 mg/dL   POCT Glucose   Result Value Ref Range    Meter Glucose 348 (H) 74 - 99 mg/dL   EKG 12 Lead   Result Value Ref Range    Ventricular Rate 71 BPM    Atrial Rate 71 BPM    P-R Interval 116 ms    QRS Duration 112 ms    Q-T Interval 454 ms    QTc Calculation (Bazett) 493 ms    P Axis 74 degrees    R Axis 69 degrees    T Axis 83 degrees       RADIOLOGY:  No orders to display       EKG: This EKG is signed and interpreted by me. Rate: 71  Rhythm: Sinus  Interpretation: non-specific EKG,LVH, prolonged   Comparison: changes compared to previous EKG      ------------------------- NURSING NOTES AND VITALS REVIEWED ---------------------------  Date / Time Roomed:  1/26/2023 12:13 PM  ED Bed Assignment:  [facility]    The nursing notes within the ED encounter and vital signs as below have been reviewed. Patient Vitals for the past 24 hrs:   BP Temp Pulse Resp SpO2 Height Weight   01/26/23 1459 -- -- -- -- -- 5' 7\" (1.702 m) 80 lb (36.3 kg)   01/26/23 1455 107/68 -- 90 -- -- -- --   01/26/23 1209 122/87 98.2 °F (36.8 °C) 90 18 98 % 5' 7\" (1.702 m) 80 lb (36.3 kg)       Oxygen Saturation Interpretation: Normal    ------------------------------------------ PROGRESS NOTES ------------------------------------------  Re-evaluation(s):  Time: 330pm  Patients symptoms show no change  Repeat physical examination is not changed    Counseling:  I have spoken with the patient and discussed todays results, in addition to providing specific details for the plan of care and counseling regarding the diagnosis and prognosis.   Their questions are answered at this time and they are agreeable with the plan of admission.    --------------------------------- ADDITIONAL PROVIDER NOTES ---------------------------------  Consultations:  Spoke with social work  This patient's ED course included: a personal history and physicial examination, re-evaluation prior to disposition, multiple bedside re-evaluations, and subcutaneous injection    This patient has remained hemodynamically stable during their ED course. Diagnosis:  1. Mental health disorder    2. History of opioid abuse ([facility] [facility]    3. Hyperglycemia        Disposition:  Patient's disposition: Medically cleared for social work to help with disposition   Patient's condition is stable.                Yvrose Palacio MD  01/26/23 2002

## 2023-01-26 NOTE — ED NOTES
KACY NUNEZ called [facility] at ([phone]) and got fax number to send over referral for pt in need of treatment. Staff asked that pt call this number listed above for an intake assessment. ENRIQUE got fax number [phone] and will fax over clinicals for review.       Theo [place]  01/26/23 4739

## 2023-01-27 VITALS
HEIGHT: 67 IN | DIASTOLIC BLOOD PRESSURE: 89 MMHG | WEIGHT: 80 LBS | RESPIRATION RATE: 16 BRPM | BODY MASS INDEX: 12.56 KG/M2 | OXYGEN SATURATION: 99 % | SYSTOLIC BLOOD PRESSURE: 123 MMHG | TEMPERATURE: 98.7 F | HEART RATE: 99 BPM

## 2023-01-27 LAB — METER GLUCOSE: 217 MG/DL (ref 74–99)

## 2023-01-27 PROCEDURE — 82962 GLUCOSE BLOOD TEST: CPT

## 2023-01-27 PROCEDURE — 96372 THER/PROPH/DIAG INJ SC/IM: CPT

## 2023-01-27 PROCEDURE — 6370000000 HC RX 637 (ALT 250 FOR IP): Performed by: STUDENT IN AN ORGANIZED HEALTH CARE EDUCATION/TRAINING PROGRAM

## 2023-01-27 RX ADMIN — INSULIN LISPRO 2 UNITS: 100 INJECTION, SOLUTION INTRAVENOUS; SUBCUTANEOUS at 08:57

## 2023-01-27 RX ADMIN — Medication 100 MG: at 09:13

## 2023-01-27 NOTE — ED NOTES
Attempted to fax referral to another fax number found for [facility] and it was the wrong number.  Fax failed     Ch[place]lain FRANCISCO Corrales, LSW  01/26/23 6039

## 2023-01-27 NOTE — ED NOTES
Call to [facility] for assistance with finding AoD treatment for pt. Елена called back and gave this  fax number for [facility] in [facility]. Referral faxed over to [phone]    [facility] OF [facility] received call back from [facility], who provided their fax number for referral packet to be sent over, [phone]. Packet faxed.       EARL Berrios  01/27/23 EARL Gallagher  01/27/23 7189

## 2023-01-27 NOTE — CARE COORDINATION
[facility] Support Note    Name: Kate Alvarado  Date: 1/27/2023    Chief Complaint   Patient presents with    Other     Original call was for high glucose (324), Patent then stated at his breaking point, (-SI/ HI)    Psychiatric Evaluation       Peer Support met with patient. [] Support and education provided  [] Resources provided   [x] Treatment referral: [facility]  [x] Other:   [] Patient declined peer recovery services     Referred By: Cleo Quigley (ENRIQUE)    Notes: Patient is willing to go to inpatient.  They will pick him up @ 11:00a    Signed: Honor Ahumada, 1/27/2023

## 2023-02-16 ENCOUNTER — HOSPITAL ENCOUNTER (EMERGENCY)
Age: 48
Discharge: HOME | End: 2023-02-16
Attending: EMERGENCY MEDICINE
Payer: COMMERCIAL

## 2023-02-16 VITALS
TEMPERATURE: 97.8 F | HEART RATE: 94 BPM | RESPIRATION RATE: 16 BRPM | OXYGEN SATURATION: 100 % | SYSTOLIC BLOOD PRESSURE: 112 MMHG | DIASTOLIC BLOOD PRESSURE: 70 MMHG

## 2023-02-16 DIAGNOSIS — U07.1 COVID-19: Primary | ICD-10-CM

## 2023-02-16 LAB — SARS-COV-2, NAAT: DETECTED

## 2023-02-16 PROCEDURE — 99283 EMERGENCY DEPT VISIT LOW MDM: CPT

## 2023-02-16 PROCEDURE — 87635 SARS-COV-2 COVID-19 AMP PRB: CPT

## 2023-02-16 RX ORDER — BROMPHENIRAMINE MALEATE, PSEUDOEPHEDRINE HYDROCHLORIDE, AND DEXTROMETHORPHAN HYDROBROMIDE 2; 30; 10 MG/5ML; MG/5ML; MG/5ML
5 SYRUP ORAL 4 TIMES DAILY PRN
Qty: 120 ML | Refills: 0 | Status: SHIPPED | OUTPATIENT
Start: 2023-02-16

## 2023-02-16 ASSESSMENT — ENCOUNTER SYMPTOMS
EYE DISCHARGE: 0
NAUSEA: 0
RHINORRHEA: 1
ABDOMINAL PAIN: 0
COUGH: 1
EYE REDNESS: 0
EYE PAIN: 0
BACK PAIN: 0
WHEEZING: 0
DIARRHEA: 0
VOMITING: 0
SORE THROAT: 0
SHORTNESS OF BREATH: 0
SINUS PRESSURE: 0

## 2023-02-16 ASSESSMENT — PAIN SCALES - GENERAL: PAINLEVEL_OUTOF10: 8

## 2023-02-16 ASSESSMENT — PAIN DESCRIPTION - DESCRIPTORS: DESCRIPTORS: ACHING

## 2023-02-16 ASSESSMENT — PAIN - FUNCTIONAL ASSESSMENT: PAIN_FUNCTIONAL_ASSESSMENT: 0-10

## 2023-02-16 NOTE — Clinical Note
Starr Pappas was seen and treated in our emergency department on 2/16/2023. COVID19 virus is suspected. Per the CDC guidelines we recommend home isolation until the following conditions are all met:    1. At least five days have passed since symptoms first appeared and/or had a close exposure,   2. After home isolation for five days, wearing a mask around others for the next five days,  3. At least 24 have passed since last fever without the use of fever-reducing medications and  4. Symptoms (eg cough, shortness of breath) have improved    If you have any questions or concerns, please don't hesitate to call.     He may return to work/school on 02/21/2023    Patient may return to work wearing mask from 2/21/2023 till 2/25/2023    Julio Harding MD

## 2023-02-17 NOTE — ED PROVIDER NOTES
The history is provided by the patient. URI  Presenting symptoms: congestion, cough, fatigue and rhinorrhea    Presenting symptoms: no ear pain, no facial pain, no fever and no sore throat    Severity:  Moderate  Onset quality:  Sudden  Duration:  10 hours  Chronicity:  New  Associated symptoms: no arthralgias, no headaches and no wheezing       Review of Systems   Constitutional:  Positive for fatigue. Negative for chills and fever. HENT:  Positive for congestion and rhinorrhea. Negative for ear pain, sinus pressure and sore throat. Eyes:  Negative for pain, discharge and redness. Respiratory:  Positive for cough. Negative for shortness of breath and wheezing. Cardiovascular:  Negative for chest pain. Gastrointestinal:  Negative for abdominal pain, diarrhea, nausea and vomiting. Genitourinary:  Negative for dysuria and frequency. Musculoskeletal:  Negative for arthralgias and back pain. Skin:  Negative for rash and wound. Neurological:  Negative for weakness and headaches. Hematological:  Negative for adenopathy. All other systems reviewed and are negative. Physical Exam  Vitals and nursing note reviewed. Constitutional:       Appearance: He is well-developed. HENT:      Head: Normocephalic and atraumatic. Jaw: No trismus. Right Ear: Hearing and external ear normal. Tympanic membrane is retracted. Left Ear: Hearing and external ear normal. Tympanic membrane is retracted. Nose: Mucosal edema and congestion present. Right Sinus: No maxillary sinus tenderness or frontal sinus tenderness. Left Sinus: No maxillary sinus tenderness or frontal sinus tenderness. Mouth/Throat:      Pharynx: Uvula midline. No uvula swelling. Eyes:      General: Lids are normal.      Conjunctiva/sclera: Conjunctivae normal.      Pupils: Pupils are equal, round, and reactive to light. Cardiovascular:      Rate and Rhythm: Normal rate and regular rhythm.       Heart sounds: Normal heart sounds. No murmur heard. Pulmonary:      Effort: Pulmonary effort is normal. No respiratory distress. Breath sounds: Normal breath sounds. No wheezing or rales. Abdominal:      General: Bowel sounds are normal.      Palpations: Abdomen is soft. Abdomen is not rigid. Tenderness: There is no abdominal tenderness. There is no guarding or rebound. Musculoskeletal:      Cervical back: Normal range of motion and neck supple. Skin:     General: Skin is warm and dry. Findings: No abrasion or rash. Neurological:      Mental Status: He is alert and oriented to person, place, and time. GCS: GCS eye subscore is 4. GCS verbal subscore is 5. GCS motor subscore is 6. Cranial Nerves: No cranial nerve deficit. Sensory: No sensory deficit. Coordination: Coordination normal.      Gait: Gait normal.        Procedures     MDM          --------------------------------------------- PAST HISTORY ---------------------------------------------  Past Medical History:  has a past medical history of Diabetes mellitus ([facility] [address] [facility] Hypertension, and Neuropathy. Past Surgical History:  has no past surgical history on file. Social History:  reports that he has been smoking cigarettes. He has a 20.00 pack-year smoking history. He has never used smokeless tobacco. He reports current alcohol use of about 12.0 standard drinks per week. He reports current drug use. Drugs: Marijuana (Weed) and Cocaine. Family History: family history includes Diabetes in his mother; Prostate Cancer in his father. The patients home medications have been reviewed.     Allergies: Bee venom, Ibuprofen, and Nutritional supplements    -------------------------------------------------- RESULTS -------------------------------------------------  Labs:  Results for orders placed or performed during the hospital encounter of 02/16/23   COVID-19, Rapid Specimen: Nasopharyngeal Swab   Result Value Ref Range    SARS-CoV-2, NAAT DETECTED (A) Not Detected       Radiology:  No orders to display       ------------------------- NURSING NOTES AND VITALS REVIEWED ---------------------------  Date / Time Roomed:  2/16/2023  6:53 PM  ED Bed Assignment:  01/01    The nursing notes within the ED encounter and vital signs as below have been reviewed. /70   Pulse 94   Temp 97.8 °F (36.6 °C) (Temporal)   Resp 16   SpO2 100%   Oxygen Saturation Interpretation: Normal      ------------------------------------------ PROGRESS NOTES ------------------------------------------  I have spoken with the patient and discussed todays results, in addition to providing specific details for the plan of care and counseling regarding the diagnosis and prognosis. Their questions are answered at this time and they are agreeable with the plan. I discussed at length with them reasons for immediate return here for re evaluation. They will followup with primary care by calling their office tomorrow. Medications - No data to display      --------------------------------- ADDITIONAL PROVIDER NOTES ---------------------------------  At this time the patient is without objective evidence of an acute process requiring hospitalization or inpatient management. They have remained hemodynamically stable throughout their entire ED visit and are stable for discharge with outpatient follow-up. The plan has been discussed in detail and they are aware of the specific conditions for emergent return, as well as the importance of follow-up. New Prescriptions    BROMPHENIRAMINE-PSEUDOEPHEDRINE-DM 2-30-10 MG/5ML SYRUP    Take 5 mLs by mouth 4 times daily as needed for Cough or Congestion       Diagnosis:  1. COVID-19        Disposition:  Patient's disposition: Discharge to home  Patient's condition is stable.                  Dariusz Rivera MD  02/16/23 3872

## 2023-02-18 ENCOUNTER — HOSPITAL ENCOUNTER (EMERGENCY)
Age: 48
Discharge: HOME | End: 2023-02-18
Attending: EMERGENCY MEDICINE
Payer: COMMERCIAL

## 2023-02-18 VITALS
SYSTOLIC BLOOD PRESSURE: 100 MMHG | HEIGHT: 66 IN | DIASTOLIC BLOOD PRESSURE: 70 MMHG | TEMPERATURE: 97.1 F | RESPIRATION RATE: 16 BRPM | BODY MASS INDEX: 21.69 KG/M2 | HEART RATE: 84 BPM | OXYGEN SATURATION: 100 % | WEIGHT: 135 LBS

## 2023-02-18 DIAGNOSIS — U07.1 COVID-19: Primary | ICD-10-CM

## 2023-02-18 PROCEDURE — 99282 EMERGENCY DEPT VISIT SF MDM: CPT

## 2023-02-18 ASSESSMENT — PAIN - FUNCTIONAL ASSESSMENT: PAIN_FUNCTIONAL_ASSESSMENT: NONE - DENIES PAIN

## 2023-02-18 NOTE — ED PROVIDER NOTES
HPI:  2/18/23,   Time: 1:49 PM ASHLEY Campoverde is a 49 y.o. male presenting to the ED for chief complaint: \" I tested positive for COVID 2 days ago at your facility, I feel better and want to be released back to work\". Patient denies fever chest pain shortness of breath palpitations nausea vomiting  ROS:   Pertinent positives and negatives are stated within HPI, all other systems reviewed and are negative.  --------------------------------------------- PAST HISTORY ---------------------------------------------  Past Medical History:  has a past medical history of Diabetes mellitus ([facility] [address] HIV (human immunodeficiency virus infection) ([facility] [address] Hypertension, and Neuropathy. Past Surgical History:  has no past surgical history on file. Social History:  reports that he has been smoking cigarettes. He has a 20.00 pack-year smoking history. He has never used smokeless tobacco. He reports current alcohol use of about 12.0 standard drinks per week. He reports current drug use. Drugs: Marijuana (Weed) and Cocaine. Family History: family history includes Diabetes in his mother; Prostate Cancer in his father. The patients home medications have been reviewed. Allergies: Bee venom, Ibuprofen, and Nutritional supplements    -------------------------------------------------- RESULTS -------------------------------------------------  All laboratory and radiology results have been personally reviewed by myself   LABS:  No results found for this visit on 02/18/23. RADIOLOGY:  Interpreted by Radiologist.  No orders to display       ------------------------- NURSING NOTES AND VITALS REVIEWED ---------------------------   The nursing notes within the ED encounter and vital signs as below have been reviewed.    /70   Pulse 84   Temp 97.1 °F (36.2 °C) (Temporal)   Resp 16   Ht 5' 6\" (1.676 m)   Wt 135 lb (61.2 kg)   SpO2 100%   BMI 21.79 kg/m²   Oxygen Saturation Interpretation: Normal      ---------------------------------------------------PHYSICAL EXAM--------------------------------------      Constitutional/General: Alert and oriented x3, well appearing, non toxic in NAD  Head: NC/AT  Eyes: PERRL, EOMI  Mouth: Oropharynx clear, handling secretions, no trismus  Neck: Supple, full ROM, no meningeal signs  Pulmonary: Lungs clear to auscultation bilaterally, no wheezes, rales, or rhonchi. Not in respiratory distress  Cardiovascular:  Regular rate and rhythm, no murmurs, gallops, or rubs. 2+ distal pulses  Abdomen: Soft, non tender, non distended,   Extremities: Moves all extremities x 4. Warm and well perfused  Skin: warm and dry without rash  Neurologic: GCS 15,  Psych: Normal Affect      ------------------------------ ED COURSE/MEDICAL DECISION MAKING----------------------  Medications - No data to display      Medical Decision Making:    I explained to the patient that as per CDC guidelines, work excuse was already given. I will reprint previous work excuse along with new work excuse stating the same, that he may return to work wearing mask from 2/21/2023 till 2/25/2023. Counseling: The emergency provider has spoken with the patient and discussed todays results, in addition to providing specific details for the plan of care and counseling regarding the diagnosis and prognosis.   Questions are answered at this time and they are agreeable with the plan.      --------------------------------- IMPRESSION AND DISPOSITION ---------------------------------    IMPRESSION  1. COVID-19        DISPOSITION  Disposition: Discharge to home  Patient condition is good                  Anatoly Gregg MD  02/18/23 [id number]

## 2023-02-18 NOTE — Clinical Note
Stef Pacheco was seen and treated in our emergency department on 2/18/2023. COVID19 virus is suspected. Per the CDC guidelines we recommend home isolation until the following conditions are all met:    1. At least five days have passed since symptoms first appeared and/or had a close exposure,   2. After home isolation for five days, wearing a mask around others for the next five days,  3. At least 24 have passed since last fever without the use of fever-reducing medications and  4. Symptoms (eg cough, shortness of breath) have improved    If you have any questions or concerns, please don't hesitate to call.     He may return to work/school on 02/21/2023    Patient may return to work wearing mask from 2/21/2023 till 2/25/2023    Julio Romano MD

## 2023-09-02 ENCOUNTER — HOSPITAL ENCOUNTER (EMERGENCY)
Age: 48
Discharge: HOME | End: 2023-09-02
Attending: STUDENT IN AN ORGANIZED HEALTH CARE EDUCATION/TRAINING PROGRAM
Payer: MEDICAID

## 2023-09-02 ENCOUNTER — APPOINTMENT (OUTPATIENT)
Dept: CT IMAGING | Age: 48
End: 2023-09-02
Payer: MEDICAID

## 2023-09-02 ENCOUNTER — APPOINTMENT (OUTPATIENT)
Dept: GENERAL RADIOLOGY | Age: 48
End: 2023-09-02
Payer: MEDICAID

## 2023-09-02 VITALS
BODY MASS INDEX: 20.46 KG/M2 | OXYGEN SATURATION: 97 % | TEMPERATURE: 98 F | HEART RATE: 98 BPM | RESPIRATION RATE: 16 BRPM | HEIGHT: 68 IN | WEIGHT: 135 LBS | DIASTOLIC BLOOD PRESSURE: 86 MMHG | SYSTOLIC BLOOD PRESSURE: 156 MMHG

## 2023-09-02 DIAGNOSIS — R53.83 OTHER FATIGUE: Primary | ICD-10-CM

## 2023-09-02 DIAGNOSIS — F19.90 DRUG USE: ICD-10-CM

## 2023-09-02 DIAGNOSIS — I10 ESSENTIAL HYPERTENSION: ICD-10-CM

## 2023-09-02 LAB
ALBUMIN SERPL-MCNC: 4 G/DL (ref 3.5–5.2)
ALP SERPL-CCNC: 92 U/L (ref 40–129)
ALT SERPL-CCNC: 13 U/L (ref 0–40)
AMPHET UR QL SCN: POSITIVE
ANION GAP SERPL CALCULATED.3IONS-SCNC: 9 MMOL/L (ref 7–16)
AST SERPL-CCNC: 13 U/L (ref 0–39)
B-OH-BUTYR SERPL-MCNC: 0.5 MMOL/L (ref 0.02–0.27)
BARBITURATES UR QL SCN: NEGATIVE
BASOPHILS # BLD: 0.08 K/UL (ref 0–0.2)
BASOPHILS NFR BLD: 1 % (ref 0–2)
BENZODIAZ UR QL: NEGATIVE
BILIRUB SERPL-MCNC: 0.4 MG/DL (ref 0–1.2)
BILIRUB UR QL STRIP: NEGATIVE
BUN SERPL-MCNC: 10 MG/DL (ref 6–20)
BUPRENORPHINE UR QL: NEGATIVE
CALCIUM SERPL-MCNC: 9.5 MG/DL (ref 8.6–10.2)
CANNABINOIDS UR QL SCN: POSITIVE
CHLORIDE SERPL-SCNC: 103 MMOL/L (ref 98–107)
CHP ED QC CHECK: NORMAL
CHP ED QC CHECK: NORMAL
CK SERPL-CCNC: 107 U/L (ref 20–200)
CLARITY UR: CLEAR
CO2 SERPL-SCNC: 28 MMOL/L (ref 22–29)
COCAINE UR QL SCN: POSITIVE
COLOR UR: YELLOW
CREAT SERPL-MCNC: 0.9 MG/DL (ref 0.7–1.2)
EOSINOPHIL # BLD: 0.19 K/UL (ref 0.05–0.5)
EOSINOPHILS RELATIVE PERCENT: 3 % (ref 0–6)
ERYTHROCYTE [DISTWIDTH] IN BLOOD BY AUTOMATED COUNT: 13.6 % (ref 11.5–15)
FENTANYL UR QL: NEGATIVE
GFR SERPL CREATININE-BSD FRML MDRD: >60 ML/MIN/1.73M2
GLUCOSE BLD-MCNC: 159 MG/DL
GLUCOSE BLD-MCNC: 159 MG/DL (ref 74–99)
GLUCOSE BLD-MCNC: 198 MG/DL
GLUCOSE BLD-MCNC: 198 MG/DL (ref 74–99)
GLUCOSE SERPL-MCNC: 264 MG/DL (ref 74–99)
GLUCOSE UR STRIP-MCNC: >=1000 MG/DL
HCT VFR BLD AUTO: 43.8 % (ref 37–54)
HGB BLD-MCNC: 14.2 G/DL (ref 12.5–16.5)
HGB UR QL STRIP.AUTO: ABNORMAL
IMM GRANULOCYTES # BLD AUTO: <0.03 K/UL (ref 0–0.58)
IMM GRANULOCYTES NFR BLD: 0 % (ref 0–5)
KETONES UR STRIP-MCNC: NEGATIVE MG/DL
LEUKOCYTE ESTERASE UR QL STRIP: NEGATIVE
LYMPHOCYTES NFR BLD: 1.74 K/UL (ref 1.5–4)
LYMPHOCYTES RELATIVE PERCENT: 25 % (ref 20–42)
MAGNESIUM SERPL-MCNC: 1.8 MG/DL (ref 1.6–2.6)
MCH RBC QN AUTO: 28.9 PG (ref 26–35)
MCHC RBC AUTO-ENTMCNC: 32.4 G/DL (ref 32–34.5)
MCV RBC AUTO: 89.2 FL (ref 80–99.9)
METHADONE UR QL: NEGATIVE
MONOCYTES NFR BLD: 0.53 K/UL (ref 0.1–0.95)
MONOCYTES NFR BLD: 8 % (ref 2–12)
NEUTROPHILS NFR BLD: 64 % (ref 43–80)
NEUTS SEG NFR BLD: 4.48 K/UL (ref 1.8–7.3)
NITRITE UR QL STRIP: NEGATIVE
OPIATES UR QL SCN: NEGATIVE
OXYCODONE UR QL SCN: NEGATIVE
PCP UR QL SCN: NEGATIVE
PH UR STRIP: 6 [PH] (ref 5–9)
PH VENOUS: 7.38 (ref 7.35–7.45)
PLATELET # BLD AUTO: 462 K/UL (ref 130–450)
PMV BLD AUTO: 9.8 FL (ref 7–12)
POTASSIUM SERPL-SCNC: 4.1 MMOL/L (ref 3.5–5)
PROT SERPL-MCNC: 7.2 G/DL (ref 6.4–8.3)
PROT UR STRIP-MCNC: NEGATIVE MG/DL
RBC # BLD AUTO: 4.91 M/UL (ref 3.8–5.8)
RBC #/AREA URNS HPF: ABNORMAL /HPF
SARS-COV-2 RDRP RESP QL NAA+PROBE: NOT DETECTED
SODIUM SERPL-SCNC: 140 MMOL/L (ref 132–146)
SP GR UR STRIP: 1.01 (ref 1–1.03)
SPECIMEN DESCRIPTION: NORMAL
TEST INFORMATION: ABNORMAL
TROPONIN I SERPL HS-MCNC: 13 NG/L (ref 0–11)
TROPONIN I SERPL HS-MCNC: 14 NG/L (ref 0–11)
UROBILINOGEN UR STRIP-ACNC: 0.2 EU/DL (ref 0–1)
WBC #/AREA URNS HPF: ABNORMAL /HPF
WBC OTHER # BLD: 7 K/UL (ref 4.5–11.5)

## 2023-09-02 PROCEDURE — 6370000000 HC RX 637 (ALT 250 FOR IP): Performed by: STUDENT IN AN ORGANIZED HEALTH CARE EDUCATION/TRAINING PROGRAM

## 2023-09-02 PROCEDURE — 70450 CT HEAD/BRAIN W/O DYE: CPT

## 2023-09-02 PROCEDURE — 85025 COMPLETE CBC W/AUTO DIFF WBC: CPT

## 2023-09-02 PROCEDURE — 80307 DRUG TEST PRSMV CHEM ANLYZR: CPT

## 2023-09-02 PROCEDURE — 81001 URINALYSIS AUTO W/SCOPE: CPT

## 2023-09-02 PROCEDURE — 84484 ASSAY OF TROPONIN QUANT: CPT

## 2023-09-02 PROCEDURE — 82550 ASSAY OF CK (CPK): CPT

## 2023-09-02 PROCEDURE — 96374 THER/PROPH/DIAG INJ IV PUSH: CPT

## 2023-09-02 PROCEDURE — 6360000002 HC RX W HCPCS: Performed by: STUDENT IN AN ORGANIZED HEALTH CARE EDUCATION/TRAINING PROGRAM

## 2023-09-02 PROCEDURE — 82800 BLOOD PH: CPT

## 2023-09-02 PROCEDURE — 96361 HYDRATE IV INFUSION ADD-ON: CPT

## 2023-09-02 PROCEDURE — 93005 ELECTROCARDIOGRAM TRACING: CPT | Performed by: STUDENT IN AN ORGANIZED HEALTH CARE EDUCATION/TRAINING PROGRAM

## 2023-09-02 PROCEDURE — 80053 COMPREHEN METABOLIC PANEL: CPT

## 2023-09-02 PROCEDURE — 82010 KETONE BODYS QUAN: CPT

## 2023-09-02 PROCEDURE — 82962 GLUCOSE BLOOD TEST: CPT

## 2023-09-02 PROCEDURE — 99285 EMERGENCY DEPT VISIT HI MDM: CPT

## 2023-09-02 PROCEDURE — 2580000003 HC RX 258: Performed by: STUDENT IN AN ORGANIZED HEALTH CARE EDUCATION/TRAINING PROGRAM

## 2023-09-02 PROCEDURE — 83735 ASSAY OF MAGNESIUM: CPT

## 2023-09-02 PROCEDURE — 71046 X-RAY EXAM CHEST 2 VIEWS: CPT

## 2023-09-02 PROCEDURE — 87635 SARS-COV-2 COVID-19 AMP PRB: CPT

## 2023-09-02 RX ORDER — 0.9 % SODIUM CHLORIDE 0.9 %
1000 INTRAVENOUS SOLUTION INTRAVENOUS ONCE
Status: COMPLETED | OUTPATIENT
Start: 2023-09-02 | End: 2023-09-02

## 2023-09-02 RX ORDER — LISINOPRIL 10 MG/1
40 TABLET ORAL ONCE
Status: COMPLETED | OUTPATIENT
Start: 2023-09-02 | End: 2023-09-02

## 2023-09-02 RX ORDER — HYDRALAZINE HYDROCHLORIDE 20 MG/ML
10 INJECTION INTRAMUSCULAR; INTRAVENOUS ONCE
Status: COMPLETED | OUTPATIENT
Start: 2023-09-02 | End: 2023-09-02

## 2023-09-02 RX ADMIN — METFORMIN HYDROCHLORIDE 500 MG: 500 TABLET ORAL at 20:35

## 2023-09-02 RX ADMIN — SODIUM CHLORIDE 1000 ML: 9 INJECTION, SOLUTION INTRAVENOUS at 16:20

## 2023-09-02 RX ADMIN — SODIUM CHLORIDE 1000 ML: 9 INJECTION, SOLUTION INTRAVENOUS at 22:00

## 2023-09-02 RX ADMIN — HYDRALAZINE HYDROCHLORIDE 10 MG: 20 INJECTION INTRAMUSCULAR; INTRAVENOUS at 19:27

## 2023-09-02 RX ADMIN — LISINOPRIL 40 MG: 10 TABLET ORAL at 20:35

## 2023-09-02 ASSESSMENT — PAIN - FUNCTIONAL ASSESSMENT: PAIN_FUNCTIONAL_ASSESSMENT: NONE - DENIES PAIN

## 2023-09-02 NOTE — ED NOTES
Pt walked approx. 45 ft in hallways showing a steady and safe gait. Pt returned to stretcher and is now laying down safely. Dr. Jesus Gonsales was notified.      [address] [place], [place]  09/02/23 3666

## 2023-09-03 LAB
EKG ATRIAL RATE: 82 BPM
EKG P AXIS: 72 DEGREES
EKG P-R INTERVAL: 130 MS
EKG Q-T INTERVAL: 342 MS
EKG QRS DURATION: 100 MS
EKG QTC CALCULATION (BAZETT): 399 MS
EKG R AXIS: 59 DEGREES
EKG T AXIS: 52 DEGREES
EKG VENTRICULAR RATE: 82 BPM

## 2023-09-03 PROCEDURE — 93010 ELECTROCARDIOGRAM REPORT: CPT | Performed by: INTERNAL MEDICINE

## 2023-09-08 ASSESSMENT — ENCOUNTER SYMPTOMS
ABDOMINAL DISTENTION: 0
VOMITING: 0
DIARRHEA: 0
CHEST TIGHTNESS: 0
ABDOMINAL PAIN: 0
COUGH: 0
BACK PAIN: 0
SHORTNESS OF BREATH: 0
PHOTOPHOBIA: 0
NAUSEA: 1

## 2023-09-08 NOTE — ED PROVIDER NOTES
Autumn Prakash is a 51year old male who presented to ED with concern for fatigue and diffuse weakness. Patient stated he was using drugs yesterday and felt ill today. Patient is having nausea without emesis. Patient denies fever, chills, chest pain, shortness of breath. Denies trauma. Denies SI/HI. Patient arrived by EMS    The history is provided by the patient, medical records and the EMS personnel. Review of Systems   Constitutional:  Positive for fatigue. Negative for chills, diaphoresis and fever. Eyes:  Negative for photophobia and visual disturbance. Respiratory:  Negative for cough, chest tightness and shortness of breath. Cardiovascular:  Negative for chest pain, palpitations and leg swelling. Gastrointestinal:  Positive for nausea. Negative for abdominal distention, abdominal pain, diarrhea and vomiting. Genitourinary:  Negative for dysuria. Musculoskeletal:  Negative for back pain, neck pain and neck stiffness. Skin:  Negative for pallor and rash. Neurological:  Positive for weakness (all extremities). Negative for headaches. Psychiatric/Behavioral:  Negative for confusion. Physical Exam  Vitals and nursing note reviewed. Constitutional:       General: He is not in acute distress. Appearance: Normal appearance. He is ill-appearing. HENT:      Head: Normocephalic and atraumatic. Eyes:      General: No scleral icterus. Conjunctiva/sclera: Conjunctivae normal.      Pupils: Pupils are equal, round, and reactive to light. Cardiovascular:      Rate and Rhythm: Regular rhythm. Tachycardia present. Pulmonary:      Effort: Pulmonary effort is normal.      Breath sounds: Normal breath sounds. Abdominal:      General: Bowel sounds are normal. There is no distension. Palpations: Abdomen is soft. Tenderness: There is no abdominal tenderness. There is no guarding or rebound. Musculoskeletal:      Cervical back: Normal range of motion and neck supple.  No 0.00 - 0.58 k/uL   Comprehensive Metabolic Panel w/ Reflex to MG   Result Value Ref Range    Glucose 264 (H) 74 - 99 mg/dL    BUN 10 6 - 20 mg/dL    Creatinine 0.9 0.70 - 1.20 mg/dL    Est, Glom Filt Rate >60 >60 mL/min/1.73m2    Calcium 9.5 8.6 - 10.2 mg/dL    Sodium 140 132 - 146 mmol/L    Potassium 4.1 3.5 - 5.0 mmol/L    Chloride 103 98 - 107 mmol/L    CO2 28 22 - 29 mmol/L    Anion Gap 9 7 - 16 mmol/L    Alkaline Phosphatase 92 40 - 129 U/L    ALT 13 0 - 40 U/L    AST 13 0 - 39 U/L    Total Bilirubin 0.4 0.0 - 1.2 mg/dL    Total Protein 7.2 6.4 - 8.3 g/dL    Albumin 4.0 3.5 - 5.2 g/dL   Troponin   Result Value Ref Range    Troponin, High Sensitivity 14 (H) 0 - 11 ng/L   Magnesium   Result Value Ref Range    Magnesium 1.8 1.6 - 2.6 mg/dL   CK   Result Value Ref Range    Total  20 - 200 U/L   Urinalysis with Microscopic   Result Value Ref Range    Color, UA Yellow Yellow    Turbidity UA Clear Clear    Glucose, Ur >=1000 (A) NEGATIVE mg/dL    Bilirubin Urine NEGATIVE NEGATIVE    Ketones, Urine NEGATIVE NEGATIVE mg/dL    Specific Gravity, UA 1.010 1.005 - 1.030    Urine Hgb TRACE (A) NEGATIVE    pH, UA 6.0 5.0 - 9.0    Protein, UA NEGATIVE NEGATIVE mg/dL    Urobilinogen, Urine 0.2 0.0 - 1.0 EU/dL    Nitrite, Urine NEGATIVE NEGATIVE    Leukocyte Esterase, Urine NEGATIVE NEGATIVE    WBC, UA 0 TO 5 0 TO 5 /HPF    RBC, UA 0 TO 2 0 TO 2 /HPF   Urine Drug Screen   Result Value Ref Range    Amphetamine Screen, Ur POSITIVE (A) NEGATIVE    Barbiturate Screen, Ur NEGATIVE NEGATIVE    Benzodiazepine Screen, Urine NEGATIVE NEGATIVE    Cocaine Metabolite, Urine POSITIVE (A) NEGATIVE    Methadone Screen, Urine NEGATIVE NEGATIVE    Opiates, Urine NEGATIVE NEGATIVE    Phencyclidine, Urine NEGATIVE NEGATIVE    Cannabinoid Scrn, Ur POSITIVE (A) NEGATIVE    Oxycodone Screen, Ur NEGATIVE NEGATIVE    Fentanyl, Ur NEGATIVE NEGATIVE    Buprenorphine Urine NEGATIVE NEGATIVE    Test Information       These drug screen results are for

## 2024-03-15 ENCOUNTER — HOSPITAL ENCOUNTER (EMERGENCY)
Age: 49
Discharge: HOME | End: 2024-03-15
Payer: MEDICARE

## 2024-03-15 VITALS
BODY MASS INDEX: 22.05 KG/M2 | OXYGEN SATURATION: 98 % | DIASTOLIC BLOOD PRESSURE: 92 MMHG | SYSTOLIC BLOOD PRESSURE: 138 MMHG | TEMPERATURE: 97.9 F | RESPIRATION RATE: 18 BRPM | HEART RATE: 81 BPM | WEIGHT: 145 LBS

## 2024-03-15 DIAGNOSIS — K08.89 PAIN, DENTAL: Primary | ICD-10-CM

## 2024-03-15 DIAGNOSIS — K02.9 DENTAL DECAY: ICD-10-CM

## 2024-03-15 PROCEDURE — 99283 EMERGENCY DEPT VISIT LOW MDM: CPT

## 2024-03-15 PROCEDURE — 6370000000 HC RX 637 (ALT 250 FOR IP): Performed by: PHYSICIAN ASSISTANT

## 2024-03-15 RX ORDER — ACETAMINOPHEN 500 MG
500 TABLET ORAL EVERY 6 HOURS PRN
Qty: 30 TABLET | Refills: 0 | Status: SHIPPED | OUTPATIENT
Start: 2024-03-15

## 2024-03-15 RX ORDER — CHLORHEXIDINE GLUCONATE ORAL RINSE 1.2 MG/ML
15 SOLUTION DENTAL 2 TIMES DAILY
Qty: 420 ML | Refills: 0 | Status: SHIPPED | OUTPATIENT
Start: 2024-03-15 | End: 2024-03-29

## 2024-03-15 RX ORDER — AMOXICILLIN AND CLAVULANATE POTASSIUM 875; 125 MG/1; MG/1
1 TABLET, FILM COATED ORAL 2 TIMES DAILY
Qty: 14 TABLET | Refills: 0 | Status: SHIPPED | OUTPATIENT
Start: 2024-03-15 | End: 2024-03-22

## 2024-03-15 RX ORDER — AMOXICILLIN AND CLAVULANATE POTASSIUM 875; 125 MG/1; MG/1
1 TABLET, FILM COATED ORAL ONCE
Status: COMPLETED | OUTPATIENT
Start: 2024-03-15 | End: 2024-03-15

## 2024-03-15 RX ADMIN — AMOXICILLIN AND CLAVULANATE POTASSIUM 1 TABLET: 875; 125 TABLET, FILM COATED ORAL at 08:50

## 2024-03-15 ASSESSMENT — LIFESTYLE VARIABLES
HOW MANY STANDARD DRINKS CONTAINING ALCOHOL DO YOU HAVE ON A TYPICAL DAY: PATIENT DOES NOT DRINK
HOW OFTEN DO YOU HAVE A DRINK CONTAINING ALCOHOL: NEVER

## 2024-03-15 ASSESSMENT — PAIN SCALES - GENERAL: PAINLEVEL_OUTOF10: 10

## 2024-03-15 ASSESSMENT — PAIN - FUNCTIONAL ASSESSMENT: PAIN_FUNCTIONAL_ASSESSMENT: 0-10

## 2024-03-15 NOTE — ED PROVIDER NOTES
Independent [facility] Visit.      Department of Emergency Medicine   ED  Provider Note  Admit Date/RoomTime: 3/15/2024  8:35 AM  ED Room: [place] [address]/    CHIEF COMPLAINT:   Chief Complaint   Patient presents with    Dental Pain     Since the last time he was here      ---------------------------------HISTORY OF PRESENT ILLNESS-----------------------------------     Audie Ortiz is a 49 y.o. male presenting to the ED for right upper dental pain that has been ongoing for the past couple weeks.  He has not called a dentist.  He states he is taken Tylenol for pain.  He denies trauma/injury, headache, dizziness, fever/chills, rash, abdominal pain, nausea, or difficulty with breathing, swallowing, or handling secretions.  Patient is alert and oriented x 3 and in no apparent distress at this exam.  He is nontoxic-appearing.  He does have a history of poor dentition    PCP: Dylan Contreras APRN - NP  Dentist: None    Review of Systems:   Pertinent positives and negatives are stated within HPI, all other systems reviewed and are negative.    --------------------------------------------- PAST HISTORY ---------------------------------------------    Past Medical History:  has a past medical history of Diabetes mellitus ([facility]), HIV (human immunodeficiency virus infection) ([facility]), Hypertension, and Neuropathy.    Past Surgical History:  has no past surgical history on file.    Social History:  reports that he has been smoking cigarettes. He has a 20.0 pack-year smoking history. He has never used smokeless tobacco. He reports current alcohol use of about 12.0 standard drinks of alcohol per week. He reports current drug use. Drugs: Marijuana (Weed) and Cocaine.    Family History: family history includes Diabetes in his mother; Prostate Cancer in his father.     The patient’s home medications have been reviewed.    Allergies: Bee venom, Ibuprofen, and Nutritional supplements  Allergies have been reviewed with patient.  hemodynamically stable during their ED course.    DISPOSITION:   Discharge to home.  Patient condition is good.    Discharge Instructions:   Patient referred to  [facility]  [address]  [facility] [address] [phone]  Go to   [facility]      730-8AM or 12PM    Dylan Contreras APRN - NP  [address] [facility] [address]  [phone]    Call in 1 day  for follow-up on ED visit    Electronically signed by Doreen Sanchez PA-C   DD: 3/15/24  I am the Primary Clinician of Record.    **This report was transcribed using voice recognition software. Every effort was made to ensure accuracy; however, inadvertent computerized transcription errors may be present.    END OF PROVIDER NOTE       Doreen Sanchez PA-C  03/15/24 0986